# Patient Record
Sex: FEMALE | Race: WHITE | Employment: OTHER | URBAN - METROPOLITAN AREA
[De-identification: names, ages, dates, MRNs, and addresses within clinical notes are randomized per-mention and may not be internally consistent; named-entity substitution may affect disease eponyms.]

---

## 2018-05-13 ENCOUNTER — APPOINTMENT (OUTPATIENT)
Dept: GENERAL RADIOLOGY | Age: 83
End: 2018-05-13
Attending: EMERGENCY MEDICINE
Payer: MEDICARE

## 2018-05-13 ENCOUNTER — HOSPITAL ENCOUNTER (EMERGENCY)
Age: 83
Discharge: HOME | End: 2018-05-13
Attending: EMERGENCY MEDICINE
Payer: MEDICARE

## 2018-05-13 ENCOUNTER — APPOINTMENT (OUTPATIENT)
Dept: CT IMAGING | Age: 83
End: 2018-05-13
Attending: EMERGENCY MEDICINE
Payer: MEDICARE

## 2018-05-13 VITALS
HEART RATE: 73 BPM | RESPIRATION RATE: 19 BRPM | SYSTOLIC BLOOD PRESSURE: 188 MMHG | WEIGHT: 125 LBS | TEMPERATURE: 98 F | BODY MASS INDEX: 25.2 KG/M2 | HEIGHT: 59 IN | DIASTOLIC BLOOD PRESSURE: 78 MMHG | OXYGEN SATURATION: 95 %

## 2018-05-13 DIAGNOSIS — N39.0 URINARY TRACT INFECTION WITHOUT HEMATURIA, SITE UNSPECIFIED: ICD-10-CM

## 2018-05-13 LAB
APPEARANCE UR: ABNORMAL
BACTERIA URNS QL MICRO: ABNORMAL /HPF
BILIRUB UR QL: NEGATIVE
COLOR UR: ABNORMAL
EPITH CASTS URNS QL MICRO: ABNORMAL /LPF
GLUCOSE UR STRIP.AUTO-MCNC: NEGATIVE MG/DL
HGB UR QL STRIP: ABNORMAL
HYALINE CASTS URNS QL MICRO: ABNORMAL /LPF (ref 0–5)
KETONES UR QL STRIP.AUTO: NEGATIVE MG/DL
LEUKOCYTE ESTERASE UR QL STRIP.AUTO: ABNORMAL
NITRITE UR QL STRIP.AUTO: POSITIVE
PH UR STRIP: 7.5 [PH] (ref 5–8)
PROT UR STRIP-MCNC: 30 MG/DL
RBC #/AREA URNS HPF: ABNORMAL /HPF (ref 0–5)
SP GR UR REFRACTOMETRY: 1.01 (ref 1–1.03)
UA: UC IF INDICATED,UAUC: ABNORMAL
UROBILINOGEN UR QL STRIP.AUTO: 0.2 EU/DL (ref 0.2–1)
WBC URNS QL MICRO: >100 /HPF (ref 0–4)

## 2018-05-13 PROCEDURE — 99285 EMERGENCY DEPT VISIT HI MDM: CPT

## 2018-05-13 PROCEDURE — 74011250637 HC RX REV CODE- 250/637: Performed by: EMERGENCY MEDICINE

## 2018-05-13 PROCEDURE — 87086 URINE CULTURE/COLONY COUNT: CPT | Performed by: EMERGENCY MEDICINE

## 2018-05-13 PROCEDURE — 72100 X-RAY EXAM L-S SPINE 2/3 VWS: CPT

## 2018-05-13 PROCEDURE — 81001 URINALYSIS AUTO W/SCOPE: CPT | Performed by: EMERGENCY MEDICINE

## 2018-05-13 PROCEDURE — 87077 CULTURE AEROBIC IDENTIFY: CPT | Performed by: EMERGENCY MEDICINE

## 2018-05-13 PROCEDURE — 93005 ELECTROCARDIOGRAM TRACING: CPT

## 2018-05-13 PROCEDURE — 70450 CT HEAD/BRAIN W/O DYE: CPT

## 2018-05-13 PROCEDURE — 87186 SC STD MICRODIL/AGAR DIL: CPT | Performed by: EMERGENCY MEDICINE

## 2018-05-13 RX ORDER — TRAMADOL HYDROCHLORIDE 50 MG/1
50 TABLET ORAL
Qty: 10 TAB | Refills: 0 | Status: ON HOLD | OUTPATIENT
Start: 2018-05-13 | End: 2018-10-30

## 2018-05-13 RX ORDER — ACETAMINOPHEN 325 MG/1
650 TABLET ORAL
Status: COMPLETED | OUTPATIENT
Start: 2018-05-13 | End: 2018-05-13

## 2018-05-13 RX ORDER — CEPHALEXIN 500 MG/1
500 CAPSULE ORAL 3 TIMES DAILY
Qty: 21 CAP | Refills: 0 | Status: SHIPPED | OUTPATIENT
Start: 2018-05-13 | End: 2018-05-20

## 2018-05-13 RX ORDER — LIDOCAINE 4 G/100G
PATCH TOPICAL
Qty: 1 PACKAGE | Refills: 0 | Status: ON HOLD | OUTPATIENT
Start: 2018-05-13 | End: 2018-10-30

## 2018-05-13 RX ADMIN — ACETAMINOPHEN 650 MG: 325 TABLET ORAL at 14:04

## 2018-05-13 NOTE — ED TRIAGE NOTES
Pt comes via EMS for GLF. Pt states she \"slipped and fell in the Kitchen, hitting her head. C/o lower back pain 5/10. Pty ambulatory with 1 assist and with walker. \" denies loss in consciousness or syncope. Denies chest pain or shortness of breath.

## 2018-05-13 NOTE — DISCHARGE INSTRUCTIONS
Compression Fracture of the Spine: Care Instructions  Your Care Instructions    A compression fracture happens when the front part of a spinal bone breaks and collapses. A fall or other accident can cause it. A minor injury or moving the wrong way can cause a break if you have thin or brittle bones (osteoporosis). These types of breaks will heal in 8 to 10 weeks. You will need rest and pain medicines. Your doctor may recommend physical therapy. Some doctors recommend that certain people with compression fractures wear braces. Your doctor also may treat thin or brittle bones. You may need surgery if you have lasting pain or if the bone presses on the spinal cord or nerves. You heal best when you take good care of yourself. Eat a variety of healthy foods, and don't smoke. Follow-up care is a key part of your treatment and safety. Be sure to make and go to all appointments, and call your doctor if you are having problems. It's also a good idea to know your test results and keep a list of the medicines you take. How can you care for yourself at home? · Be safe with medicines. Read and follow all instructions on the label. ¨ If the doctor gave you a prescription medicine for pain, take it as prescribed. ¨ If you are not taking a prescription pain medicine, ask your doctor if you can take an over-the-counter medicine. · Talk to your doctor about how to make your bones stronger. You may need medicines or a change in what you eat. · Be active only as directed by your doctor. When should you call for help? Call 911 anytime you think you may need emergency care. For example, call if:  ? · You are unable to move an arm or a leg at all. ?Call your doctor now or seek immediate medical care if:  ? · You have new or worse symptoms in your arms, legs, belly, or buttocks. Symptoms may include:  ¨ Numbness or tingling. ¨ Weakness. ¨ Pain. ? · You lose bladder or bowel control.    ? · You have belly pain, bloating, vomiting, or nausea. ? Watch closely for changes in your health, and be sure to contact your doctor if:  ? · You do not get better as expected. Where can you learn more? Go to http://lisbet-jose ramon.info/. Enter P445 in the search box to learn more about \"Compression Fracture of the Spine: Care Instructions. \"  Current as of: March 21, 2017  Content Version: 11.4  © 2207-3364 [employer]. Care instructions adapted under license by [employer] (which disclaims liability or warranty for this information). If you have questions about a medical condition or this instruction, always ask your healthcare professional. [place] [address] any warranty or liability for your use of this information. Urinary Tract Infection in Women: Care Instructions  Your Care Instructions    A urinary tract infection, or UTI, is a general term for an infection anywhere between the kidneys and the urethra (where urine comes out). Most UTIs are bladder infections. They often cause pain or burning when you urinate. UTIs are caused by bacteria and can be cured with antibiotics. Be sure to complete your treatment so that the infection goes away. Follow-up care is a key part of your treatment and safety. Be sure to make and go to all appointments, and call your doctor if you are having problems. It's also a good idea to know your test results and keep a list of the medicines you take. How can you care for yourself at home? · Take your antibiotics as directed. Do not stop taking them just because you feel better. You need to take the full course of antibiotics. · Drink extra water and other fluids for the next day or two. This may help wash out the bacteria that are causing the infection.  (If you have kidney, heart, or liver disease and have to limit fluids, talk with your doctor before you increase your fluid intake.)  · Avoid drinks that are carbonated or have caffeine. They can irritate the bladder. · Urinate often. Try to empty your bladder each time. · To relieve pain, take a hot bath or lay a heating pad set on low over your lower belly or genital area. Never go to sleep with a heating pad in place. To prevent UTIs  · Drink plenty of water each day. This helps you urinate often, which clears bacteria from your system. (If you have kidney, heart, or liver disease and have to limit fluids, talk with your doctor before you increase your fluid intake.)  · Urinate when you need to. · Urinate right after you have sex. · Change sanitary pads often. · Avoid douches, bubble baths, feminine hygiene sprays, and other feminine hygiene products that have deodorants. · After going to the bathroom, wipe from front to back. When should you call for help? Call your doctor now or seek immediate medical care if:  ? · Symptoms such as fever, chills, nausea, or vomiting get worse or appear for the first time. ? · You have new pain in your back just below your rib cage. This is called flank pain. ? · There is new blood or pus in your urine. ? · You have any problems with your antibiotic medicine. ? Watch closely for changes in your health, and be sure to contact your doctor if:  ? · You are not getting better after taking an antibiotic for 2 days. ? · Your symptoms go away but then come back. Where can you learn more? Go to http://lisbet-jose ramon.info/. Enter Q038 in the search box to learn more about \"Urinary Tract Infection in Women: Care Instructions. \"  Current as of: May 12, 2017  Content Version: 11.4  © 7459-7957 [employer]. Care instructions adapted under license by [employer] (which disclaims liability or warranty for this information).  If you have questions about a medical condition or this instruction, always ask your healthcare professional. [place] [address] any warranty or liability for your use of this information.

## 2018-05-13 NOTE — ED PROVIDER NOTES
EMERGENCY DEPARTMENT HISTORY AND PHYSICAL EXAM      Date: 5/13/2018  Patient Name: Susu Ann    History of Presenting Illness     Chief Complaint   Patient presents with    Fall     Pt arrived via EMS for c/o of GLF with lower back discomfor and pain to back of head  5/10. Pt state she hit her head after sliping on the kitchen floor, but denies loss in conciousness.  Back Pain       History Provided By: Patient    HPI: Foreign Martinez, 80 y.o. female with PMHx significant for HTN, angina, MI, CAD, cancer, arrhythmia, presents via EMS to the ED for evaluation s/p ground level fall x today. She currently c/o severe, intermittent lower back pain that is exacerbated by movement x couple of weeks. Pt describes walking through the kitchen when all of a sudden she slipped and fell to the ground hitting her head. She denies any LOC, and was not able to get up s/p the fall. Of note, pt wears an NTG patch due to her Hx of CAD, and has been taking over the counter Tylenol recently for her back pain. She denies any abdominal pain. There are no other denying factors at this time. There are no other complaints, changes, or physical findings at this time. PCP: Renetta Marcelino MD    Current Outpatient Prescriptions   Medication Sig Dispense Refill    cephALEXin (KEFLEX) 500 mg capsule Take 1 Cap by mouth three (3) times daily for 7 days. 21 Cap 0    lidocaine (SALONPAS/ASPERCREME) 4 % patch Take as directed 1 Package 0    traMADol (ULTRAM) 50 mg tablet Take 1 Tab by mouth every six (6) hours as needed for Pain. Max Daily Amount: 200 mg. Indications: Pain 10 Tab 0    furosemide (LASIX) 40 mg tablet Just two more days 2 Tab 0    simvastatin (ZOCOR) 10 mg tablet Take 10 mg by mouth daily.  cloNIDine HCl (CATAPRES) 0.1 mg tablet Take 0.1 mg by mouth nightly.  diazepam (VALIUM) 2 mg tablet Take 2 mg by mouth every twelve (12) hours.       nitroglycerin (NITRODUR) 0.2 mg/hr 1 Patch by TransDERmal route daily.  trazodone (DESYREL) 50 mg tablet Take 50 mg by mouth nightly.  aspirin 81 mg tablet Take 81 mg by mouth daily.  metoprolol (LOPRESSOR) 50 mg tablet Take 25 mg by mouth every morning.  metoprolol (LOPRESSOR) 50 mg tablet Take 50 mg by mouth every evening.  Fish Oil-Omega-3 Fatty Acids (FISH OIL) 360-1,200 mg Cap Take 1 Cap by mouth two (2) times a day.  nitroglycerin (NITROSTAT) 0.4 mg SL tablet 0.4 mg by SubLINGual route every five (5) minutes as needed. Past History     Past Medical History:  Past Medical History:   Diagnosis Date    Angina     has had mi    Arrhythmia     Cancer ([facility] [address]     basal cell removed form back    Chronic pain     fibromyalgia    Hypertension     Insomnia     Thyroid disease        Past Surgical History:  Past Surgical History:   Procedure Laterality Date    HX CHOLECYSTECTOMY      HX CORONARY STENT PLACEMENT      HX OTHER SURGICAL      basal cell removed from back       Family History:  History reviewed. No pertinent family history. Social History:  Social History   Substance Use Topics    Smoking status: Never Smoker    Smokeless tobacco: Never Used    Alcohol use No       Allergies: Allergies   Allergen Reactions    Celebrex [Celecoxib] Rash    Ciprofloxacin Rash    Ivp [Fd And C Blue No.1] Rash and Swelling    Macrobid [Nitrofurantoin Monohyd/M-Cryst] Swelling    Niaspan [Niacin] Palpitations    Pcn [Penicillins] Rash and Swelling    Seafood [Shellfish Containing Products] Rash and Swelling         Review of Systems   Review of Systems   Constitutional: Negative for fatigue and fever. HENT: Negative. Eyes: Negative. Respiratory: Negative for shortness of breath and wheezing. Cardiovascular: Negative for chest pain and leg swelling. Gastrointestinal: Negative for abdominal pain, blood in stool, constipation, diarrhea, nausea and vomiting. Endocrine: Negative.     Genitourinary: Negative for difficulty urinating and dysuria. Musculoskeletal: Positive for back pain. Skin: Negative for rash. Allergic/Immunologic: Negative. Neurological: Negative for weakness and numbness. Hematological: Negative. Psychiatric/Behavioral: Negative. Physical Exam   Physical Exam   Constitutional: She is oriented to person, place, and time. She appears well-developed and well-nourished. HENT:   Head: Normocephalic and atraumatic. Mouth/Throat: Mucous membranes are normal.   Eyes: EOM are normal. Pupils are equal, round, and reactive to light. Neck: Normal range of motion. No JVD present. No tracheal deviation present. Cardiovascular: Normal rate, regular rhythm, normal heart sounds and intact distal pulses. Exam reveals no gallop and no friction rub. No murmur heard. Pulmonary/Chest: Effort normal and breath sounds normal. No stridor. She has no wheezes. She has no rales. She exhibits no tenderness. Abdominal: Soft. Bowel sounds are normal. She exhibits no distension and no mass. There is no tenderness. There is no guarding. Musculoskeletal: Normal range of motion. She exhibits no edema or tenderness. No T spine tenderness or stepoffs. Tenderness to her L3-L5 region as well as some para spinal lumbar tenderness. Full ROM of extremities. Stable pelvis. No swelling or deformities. No midline C spine tenderness or stepoffs. Nml ROM of neck. Neurological: She is alert and oriented to person, place, and time. Skin: Skin is warm and dry. No rash noted. Hematoma on her posterior occiput. Psychiatric: She has a normal mood and affect.  Her behavior is normal. Judgment and thought content normal.         Diagnostic Study Results     Labs -     Recent Results (from the past 12 hour(s))   EKG, 12 LEAD, INITIAL    Collection Time: 05/13/18 11:44 AM   Result Value Ref Range    Ventricular Rate 74 BPM    Atrial Rate 74 BPM    P-R Interval 210 ms    QRS Duration 98 ms    Q-T Interval 416 ms QTC Calculation (Bezet) 461 ms    Calculated P Axis 57 degrees    Calculated R Axis -31 degrees    Calculated T Axis 28 degrees    Diagnosis       Sinus rhythm with 1st degree AV block  Left axis deviation  Abnormal ECG  When compared with ECG of 04-FEB-2016 20:20,  No significant change was found     URINALYSIS W/ REFLEX CULTURE    Collection Time: 05/13/18 12:46 PM   Result Value Ref Range    Color YELLOW/STRAW      Appearance CLOUDY (A) CLEAR      Specific gravity 1.008 1.003 - 1.030      pH (UA) 7.5 5.0 - 8.0      Protein 30 (A) NEG mg/dL    Glucose NEGATIVE  NEG mg/dL    Ketone NEGATIVE  NEG mg/dL    Bilirubin NEGATIVE  NEG      Blood SMALL (A) NEG      Urobilinogen 0.2 0.2 - 1.0 EU/dL    Nitrites POSITIVE (A) NEG      Leukocyte Esterase LARGE (A) NEG      WBC >100 (H) 0 - 4 /hpf    RBC 0-5 0 - 5 /hpf    Epithelial cells FEW FEW /lpf    Bacteria 4+ (A) NEG /hpf    UA:UC IF INDICATED URINE CULTURE ORDERED (A) CNI      Hyaline cast 0-2 0 - 5 /lpf       Radiologic Studies -   XR SPINE LUMB 2 OR 3 V   Final Result   EXAM:  XR SPINE LUMB 2 OR 3 V     INDICATION:   Back Pain     COMPARISON: None.     FINDINGS: AP, lateral and spot lateral views of the lumbar spine demonstrate  moderate diffuse osteopenia. There are are moderate T12, mild to moderate L3,  mild L4 and minimal-mild L5 vertebral compression fractures. There is anatomic  alignment. Degenerative findings are shown in the thoracic region and throughout  the lumbar spine with mild disc space narrowing through L3-4. There is mild  bilateral SI osteoarthrosis. Partial visualization of internal fixation of the  right proximal femur shown.      IMPRESSION  IMPRESSION:  Osteopenia, vertebral compression fractures and degenerative  changes.        CT Results  (Last 48 hours)               05/13/18 1217  CT HEAD WO CONT Final result    Impression:  IMPRESSION:       No acute traumatic injury.            Narrative:  INDICATION:   Head trauma, closed, mild, GCS >= 13, no risk factors, neuro exam   normal        EXAMINATION:  CT HEAD WO CONTRAST       COMPARISON:  February 4, 2016       TECHNIQUE:  Routine noncontrast axial head CT was performed. Sagittal and   coronal reconstructions were generated. CT dose reduction was achieved through   use of a standardized protocol tailored for this examination and automatic   exposure control for dose modulation. Courtney Koehler FINDINGS:       Ventricles:  Midline, no hydrocephalus. Intracranial Hemorrhage:  None. Brain Parenchyma/Brainstem:  Patchy white matter hypodensities throughout the   supratentorial brain are chronic and similar to the prior examination. Basal Cisterns:  Normal.    Paranasal Sinuses:  Visualized sinuses are clear. Soft Tissues:  No significant soft tissue swelling. Osseous Structures:  No acute fracture. Additional Comments:  N/A. Medical Decision Making   I am the first provider for this patient. I reviewed the vital signs, available nursing notes, past medical history, past surgical history, family history and social history. Vital Signs-Reviewed the patient's vital signs. Patient Vitals for the past 12 hrs:   Temp Pulse Resp BP SpO2   05/13/18 1146 98 °F (36.7 °C) 73 19 188/78 95 %       EKG interpretation: (Preliminary) 1144  Rhythm: sinus rhythm with 1st degree AV block; and regular . Rate (approx.): 74 bpm; Axis: left axis deviation; IA interval: prolonged; QRS interval: normal ; ST/T wave: no changes; Other findings: abnormal ekg. Records Reviewed: Old Medical Records    Provider Notes (Medical Decision Making):     DDx: ICH, fracture, contusion, sprain, strain, UTI. Pt status post mechanical fall. No LOC. Is not on anticoagulants. Will get xray of lumbar spine and head ct to r/o trauma. Per son, pt tends to get UTI. Will check UA. ED Course:   Initial assessment performed.  The patients presenting problems have been discussed, and they are in agreement with the care plan formulated and outlined with them. I have encouraged them to ask questions as they arise throughout their visit. 1:53 PM  Updated the pt on lab/imaging results. Will prepare for discharge  Written by Lashaun Carr ED scribe, as dictated by Yared Parikh DO    Disposition:  DISCHARGE NOTE  2:44 PM  The patient has been re-evaluated and is ready for discharge. Reviewed available results with patient. Counseled pt on diagnosis and care plan. Pt has expressed understanding, and all questions have been answered. Pt agrees with plan and agrees to F/U as recommended, or return to the ED if their sxs worsen. Discharge instructions have been provided and explained to the pt, along with reasons to return to the ED. PLAN:  1. Discharge Medication List as of 5/13/2018  2:14 PM      START taking these medications    Details   cephALEXin (KEFLEX) 500 mg capsule Take 1 Cap by mouth three (3) times daily for 7 days. , Normal, Disp-21 Cap, R-0      lidocaine (SALONPAS/ASPERCREME) 4 % patch Take as directed, Normal, Disp-1 Package, R-0      traMADol (ULTRAM) 50 mg tablet Take 1 Tab by mouth every six (6) hours as needed for Pain. Max Daily Amount: 200 mg. Indications: Pain, Print, Disp-10 Tab, R-0         CONTINUE these medications which have NOT CHANGED    Details   furosemide (LASIX) 40 mg tablet Just two more days, Print, Disp-2 Tab, R-0      simvastatin (ZOCOR) 10 mg tablet Take 10 mg by mouth daily. , Historical Med      cloNIDine HCl (CATAPRES) 0.1 mg tablet Take 0.1 mg by mouth nightly., Historical Med      diazepam (VALIUM) 2 mg tablet Take 2 mg by mouth every twelve (12) hours. , Historical Med      nitroglycerin (NITRODUR) 0.2 mg/hr 1 Patch, TransDERmal, DAILY, Until Discontinued, Historical Med      trazodone (DESYREL) 50 mg tablet 50 mg, Oral, EVERY BEDTIME, Until Discontinued, Historical Med      aspirin 81 mg tablet 81 mg, Oral, DAILY Starting 12/2/2010, Until Discontinued, Historical Med      !! metoprolol (LOPRESSOR) 50 mg tablet 25 mg, Oral, EVERY MORNING Starting 12/2/2010, Until Discontinued, Historical Med      !! metoprolol (LOPRESSOR) 50 mg tablet 50 mg, Oral, EVERY EVENING Starting 12/2/2010, Until Discontinued, Historical Med      Fish Oil-Omega-3 Fatty Acids (FISH OIL) 360-1,200 mg Cap 1 Cap, Oral, 2 TIMES DAILY Starting 12/2/2010, Until Discontinued, Historical Med      nitroglycerin (NITROSTAT) 0.4 mg SL tablet 0.4 mg, SubLINGual, EVERY 5 MIN AS NEEDED, Until Discontinued, Historical Med       !! - Potential duplicate medications found. Please discuss with provider. 2.   Follow-up Information     Follow up With Details Comments [id number] Desai MD Schedule an appointment as soon as possible for a visit      [facility]  As needed, If symptoms worsen [address] [place] [address]  [phone]        Return to ED if worse     Diagnosis     Clinical Impression:   1. Compression fracture of vertebra, initial encounter ([facility] [address]    2. Urinary tract infection without hematuria, site unspecified        Attestations: This note is prepared by Norm Duenas, acting as Scribe for Pelon Arellano DO. The scribe's documentation has been prepared under my direction and personally reviewed by me in its entirety. I confirm that the note above accurately reflects all work, treatment, procedures, and medical decision making performed by me.   Pelon Arellano DO

## 2018-05-13 NOTE — ED NOTES
Tonia Huffman DO reviewed discharge instructions with the patient. The patient verbalized understanding. wheelchair on discharge.

## 2018-05-14 LAB
ATRIAL RATE: 74 BPM
CALCULATED P AXIS, ECG09: 57 DEGREES
CALCULATED R AXIS, ECG10: -31 DEGREES
CALCULATED T AXIS, ECG11: 28 DEGREES
DIAGNOSIS, 93000: NORMAL
P-R INTERVAL, ECG05: 210 MS
Q-T INTERVAL, ECG07: 416 MS
QRS DURATION, ECG06: 98 MS
QTC CALCULATION (BEZET), ECG08: 461 MS
VENTRICULAR RATE, ECG03: 74 BPM

## 2018-05-15 LAB
BACTERIA SPEC CULT: ABNORMAL
CC UR VC: ABNORMAL
SERVICE CMNT-IMP: ABNORMAL

## 2018-10-29 ENCOUNTER — APPOINTMENT (OUTPATIENT)
Dept: CT IMAGING | Age: 83
DRG: 478 | End: 2018-10-29
Attending: EMERGENCY MEDICINE
Payer: MEDICARE

## 2018-10-29 ENCOUNTER — APPOINTMENT (OUTPATIENT)
Dept: GENERAL RADIOLOGY | Age: 83
DRG: 478 | End: 2018-10-29
Attending: EMERGENCY MEDICINE
Payer: MEDICARE

## 2018-10-29 ENCOUNTER — HOSPITAL ENCOUNTER (INPATIENT)
Age: 83
LOS: 4 days | Discharge: NURSING, REHAB OR LONG-TERM CARE | DRG: 478 | End: 2018-11-02
Attending: EMERGENCY MEDICINE | Admitting: HOSPITALIST
Payer: MEDICARE

## 2018-10-29 DIAGNOSIS — N30.00 ACUTE CYSTITIS WITHOUT HEMATURIA: Primary | ICD-10-CM

## 2018-10-29 DIAGNOSIS — S32.040A CLOSED COMPRESSION FRACTURE OF FOURTH LUMBAR VERTEBRA, INITIAL ENCOUNTER: ICD-10-CM

## 2018-10-29 LAB
ALBUMIN SERPL-MCNC: 3.7 G/DL (ref 3.5–5)
ALBUMIN/GLOB SERPL: 0.9 {RATIO} (ref 1.1–2.2)
ALP SERPL-CCNC: 97 U/L (ref 45–117)
ALT SERPL-CCNC: 23 U/L (ref 12–78)
ANION GAP SERPL CALC-SCNC: 9 MMOL/L (ref 5–15)
APPEARANCE UR: ABNORMAL
AST SERPL-CCNC: 15 U/L (ref 15–37)
BACTERIA URNS QL MICRO: ABNORMAL /HPF
BASOPHILS # BLD: 0 K/UL (ref 0–0.1)
BASOPHILS NFR BLD: 0 % (ref 0–1)
BILIRUB SERPL-MCNC: 0.6 MG/DL (ref 0.2–1)
BILIRUB UR QL: NEGATIVE
BUN SERPL-MCNC: 10 MG/DL (ref 6–20)
BUN/CREAT SERPL: 21 (ref 12–20)
CALCIUM SERPL-MCNC: 8.7 MG/DL (ref 8.5–10.1)
CHLORIDE SERPL-SCNC: 100 MMOL/L (ref 97–108)
CO2 SERPL-SCNC: 22 MMOL/L (ref 21–32)
COLOR UR: ABNORMAL
CREAT SERPL-MCNC: 0.48 MG/DL (ref 0.55–1.02)
DIFFERENTIAL METHOD BLD: ABNORMAL
EOSINOPHIL # BLD: 0.1 K/UL (ref 0–0.4)
EOSINOPHIL NFR BLD: 1 % (ref 0–7)
EPITH CASTS URNS QL MICRO: ABNORMAL /LPF
ERYTHROCYTE [DISTWIDTH] IN BLOOD BY AUTOMATED COUNT: 15.1 % (ref 11.5–14.5)
GLOBULIN SER CALC-MCNC: 4.2 G/DL (ref 2–4)
GLUCOSE SERPL-MCNC: 113 MG/DL (ref 65–100)
GLUCOSE UR STRIP.AUTO-MCNC: NEGATIVE MG/DL
HCT VFR BLD AUTO: 44.9 % (ref 35–47)
HGB BLD-MCNC: 15.5 G/DL (ref 11.5–16)
HGB UR QL STRIP: NEGATIVE
HYALINE CASTS URNS QL MICRO: ABNORMAL /LPF (ref 0–5)
IMM GRANULOCYTES # BLD: 0.1 K/UL (ref 0–0.04)
IMM GRANULOCYTES NFR BLD AUTO: 1 % (ref 0–0.5)
KETONES UR QL STRIP.AUTO: ABNORMAL MG/DL
LACTATE SERPL-SCNC: 1.1 MMOL/L (ref 0.4–2)
LEUKOCYTE ESTERASE UR QL STRIP.AUTO: ABNORMAL
LYMPHOCYTES # BLD: 0.7 K/UL (ref 0.8–3.5)
LYMPHOCYTES NFR BLD: 7 % (ref 12–49)
MCH RBC QN AUTO: 32.2 PG (ref 26–34)
MCHC RBC AUTO-ENTMCNC: 34.5 G/DL (ref 30–36.5)
MCV RBC AUTO: 93.2 FL (ref 80–99)
MONOCYTES # BLD: 0.6 K/UL (ref 0–1)
MONOCYTES NFR BLD: 6 % (ref 5–13)
NEUTS SEG # BLD: 9 K/UL (ref 1.8–8)
NEUTS SEG NFR BLD: 85 % (ref 32–75)
NITRITE UR QL STRIP.AUTO: POSITIVE
NRBC # BLD: 0 K/UL (ref 0–0.01)
NRBC BLD-RTO: 0 PER 100 WBC
PH UR STRIP: 7.5 [PH] (ref 5–8)
PLATELET # BLD AUTO: 255 K/UL (ref 150–400)
PMV BLD AUTO: 10.3 FL (ref 8.9–12.9)
POTASSIUM SERPL-SCNC: 3.5 MMOL/L (ref 3.5–5.1)
PROT SERPL-MCNC: 7.9 G/DL (ref 6.4–8.2)
PROT UR STRIP-MCNC: ABNORMAL MG/DL
RBC # BLD AUTO: 4.82 M/UL (ref 3.8–5.2)
RBC #/AREA URNS HPF: ABNORMAL /HPF (ref 0–5)
RBC MORPH BLD: ABNORMAL
SODIUM SERPL-SCNC: 131 MMOL/L (ref 136–145)
SP GR UR REFRACTOMETRY: 1.01 (ref 1–1.03)
UA: UC IF INDICATED,UAUC: ABNORMAL
UROBILINOGEN UR QL STRIP.AUTO: 0.2 EU/DL (ref 0.2–1)
WBC # BLD AUTO: 10.5 K/UL (ref 3.6–11)
WBC URNS QL MICRO: ABNORMAL /HPF (ref 0–4)

## 2018-10-29 PROCEDURE — 96365 THER/PROPH/DIAG IV INF INIT: CPT

## 2018-10-29 PROCEDURE — 85025 COMPLETE CBC W/AUTO DIFF WBC: CPT | Performed by: EMERGENCY MEDICINE

## 2018-10-29 PROCEDURE — 74011250636 HC RX REV CODE- 250/636: Performed by: EMERGENCY MEDICINE

## 2018-10-29 PROCEDURE — 87040 BLOOD CULTURE FOR BACTERIA: CPT | Performed by: EMERGENCY MEDICINE

## 2018-10-29 PROCEDURE — 81001 URINALYSIS AUTO W/SCOPE: CPT | Performed by: EMERGENCY MEDICINE

## 2018-10-29 PROCEDURE — 36415 COLL VENOUS BLD VENIPUNCTURE: CPT | Performed by: EMERGENCY MEDICINE

## 2018-10-29 PROCEDURE — 74011000258 HC RX REV CODE- 258: Performed by: EMERGENCY MEDICINE

## 2018-10-29 PROCEDURE — 87186 SC STD MICRODIL/AGAR DIL: CPT | Performed by: EMERGENCY MEDICINE

## 2018-10-29 PROCEDURE — 74176 CT ABD & PELVIS W/O CONTRAST: CPT

## 2018-10-29 PROCEDURE — 87077 CULTURE AEROBIC IDENTIFY: CPT | Performed by: EMERGENCY MEDICINE

## 2018-10-29 PROCEDURE — 83605 ASSAY OF LACTIC ACID: CPT | Performed by: EMERGENCY MEDICINE

## 2018-10-29 PROCEDURE — 72100 X-RAY EXAM L-S SPINE 2/3 VWS: CPT

## 2018-10-29 PROCEDURE — 74011250636 HC RX REV CODE- 250/636: Performed by: HOSPITALIST

## 2018-10-29 PROCEDURE — 74011250637 HC RX REV CODE- 250/637: Performed by: HOSPITALIST

## 2018-10-29 PROCEDURE — 65270000029 HC RM PRIVATE

## 2018-10-29 PROCEDURE — 87086 URINE CULTURE/COLONY COUNT: CPT | Performed by: EMERGENCY MEDICINE

## 2018-10-29 PROCEDURE — 80053 COMPREHEN METABOLIC PANEL: CPT | Performed by: EMERGENCY MEDICINE

## 2018-10-29 PROCEDURE — 99285 EMERGENCY DEPT VISIT HI MDM: CPT

## 2018-10-29 RX ORDER — DOCUSATE SODIUM 100 MG/1
100 CAPSULE, LIQUID FILLED ORAL 2 TIMES DAILY
Status: DISCONTINUED | OUTPATIENT
Start: 2018-10-30 | End: 2018-11-02 | Stop reason: HOSPADM

## 2018-10-29 RX ORDER — NALOXONE HYDROCHLORIDE 0.4 MG/ML
0.4 INJECTION, SOLUTION INTRAMUSCULAR; INTRAVENOUS; SUBCUTANEOUS AS NEEDED
Status: DISCONTINUED | OUTPATIENT
Start: 2018-10-29 | End: 2018-11-02 | Stop reason: HOSPADM

## 2018-10-29 RX ORDER — AMLODIPINE BESYLATE 5 MG/1
5 TABLET ORAL DAILY
COMMUNITY

## 2018-10-29 RX ORDER — SODIUM CHLORIDE 0.9 % (FLUSH) 0.9 %
5-10 SYRINGE (ML) INJECTION AS NEEDED
Status: DISCONTINUED | OUTPATIENT
Start: 2018-10-29 | End: 2018-11-02 | Stop reason: HOSPADM

## 2018-10-29 RX ORDER — AZTREONAM 1 G/1
1 INJECTION, POWDER, LYOPHILIZED, FOR SOLUTION INTRAMUSCULAR; INTRAVENOUS EVERY 8 HOURS
Status: DISCONTINUED | OUTPATIENT
Start: 2018-10-30 | End: 2018-10-29

## 2018-10-29 RX ORDER — CAPTOPRIL 100 MG/1
100 TABLET ORAL 2 TIMES DAILY
Status: ON HOLD | COMMUNITY
End: 2018-10-30

## 2018-10-29 RX ORDER — OXYCODONE HYDROCHLORIDE 5 MG/1
5 TABLET ORAL
Status: DISCONTINUED | OUTPATIENT
Start: 2018-10-29 | End: 2018-11-02 | Stop reason: HOSPADM

## 2018-10-29 RX ORDER — DIPHENHYDRAMINE HCL 25 MG
25 CAPSULE ORAL
Status: DISCONTINUED | OUTPATIENT
Start: 2018-10-29 | End: 2018-10-30

## 2018-10-29 RX ORDER — SODIUM CHLORIDE 9 MG/ML
75 INJECTION, SOLUTION INTRAVENOUS CONTINUOUS
Status: DISCONTINUED | OUTPATIENT
Start: 2018-10-29 | End: 2018-11-02 | Stop reason: HOSPADM

## 2018-10-29 RX ORDER — GUAIFENESIN 100 MG/5ML
81 LIQUID (ML) ORAL DAILY
Status: DISCONTINUED | OUTPATIENT
Start: 2018-10-30 | End: 2018-11-02 | Stop reason: HOSPADM

## 2018-10-29 RX ORDER — ACETAMINOPHEN 325 MG/1
650 TABLET ORAL EVERY 6 HOURS
Status: DISCONTINUED | OUTPATIENT
Start: 2018-10-30 | End: 2018-11-02 | Stop reason: HOSPADM

## 2018-10-29 RX ORDER — AMLODIPINE BESYLATE 5 MG/1
5 TABLET ORAL DAILY
Status: DISCONTINUED | OUTPATIENT
Start: 2018-10-30 | End: 2018-11-02 | Stop reason: HOSPADM

## 2018-10-29 RX ORDER — CAPTOPRIL 50 MG/1
50 TABLET ORAL
Status: DISCONTINUED | OUTPATIENT
Start: 2018-10-30 | End: 2018-10-30

## 2018-10-29 RX ORDER — ONDANSETRON 2 MG/ML
4 INJECTION INTRAMUSCULAR; INTRAVENOUS
Status: DISCONTINUED | OUTPATIENT
Start: 2018-10-29 | End: 2018-11-02 | Stop reason: HOSPADM

## 2018-10-29 RX ORDER — ATORVASTATIN CALCIUM 10 MG/1
10 TABLET, FILM COATED ORAL DAILY
Status: DISCONTINUED | OUTPATIENT
Start: 2018-10-30 | End: 2018-11-02 | Stop reason: HOSPADM

## 2018-10-29 RX ORDER — SODIUM CHLORIDE 0.9 % (FLUSH) 0.9 %
5-10 SYRINGE (ML) INJECTION EVERY 8 HOURS
Status: DISCONTINUED | OUTPATIENT
Start: 2018-10-29 | End: 2018-11-02 | Stop reason: HOSPADM

## 2018-10-29 RX ORDER — CAPTOPRIL 50 MG/1
100 TABLET ORAL DAILY
Status: DISCONTINUED | OUTPATIENT
Start: 2018-10-30 | End: 2018-10-30

## 2018-10-29 RX ORDER — TRAZODONE HYDROCHLORIDE 50 MG/1
50 TABLET ORAL
Status: DISCONTINUED | OUTPATIENT
Start: 2018-10-29 | End: 2018-11-02 | Stop reason: HOSPADM

## 2018-10-29 RX ORDER — DIAZEPAM 2 MG/1
2 TABLET ORAL
Status: DISCONTINUED | OUTPATIENT
Start: 2018-10-29 | End: 2018-11-02 | Stop reason: HOSPADM

## 2018-10-29 RX ADMIN — CEFTRIAXONE 1 G: 1 INJECTION, POWDER, FOR SOLUTION INTRAMUSCULAR; INTRAVENOUS at 19:18

## 2018-10-29 RX ADMIN — TRAZODONE HYDROCHLORIDE 50 MG: 50 TABLET ORAL at 23:26

## 2018-10-29 RX ADMIN — Medication 10 ML: at 23:27

## 2018-10-29 RX ADMIN — SODIUM CHLORIDE 75 ML/HR: 900 INJECTION, SOLUTION INTRAVENOUS at 23:29

## 2018-10-29 RX ADMIN — DIPHENHYDRAMINE HYDROCHLORIDE 25 MG: 25 CAPSULE ORAL at 23:26

## 2018-10-29 RX ADMIN — ACETAMINOPHEN 650 MG: 325 TABLET ORAL at 23:26

## 2018-10-29 NOTE — ED TRIAGE NOTES
Pt arrives with EMS from her doctors office for mid lower back pain. Pt provided urine sample at MD office and results were consistent with UTI. Pt had fall in July and has had pain on and off since. Pt states this pain has been on going x 1 week.

## 2018-10-29 NOTE — ED PROVIDER NOTES
EMERGENCY DEPARTMENT HISTORY AND PHYSICAL EXAM 
 
 
Date: 10/29/2018 Patient Name: Oni Ann History of Presenting Illness Chief Complaint Patient presents with  Back Pain History Provided By: Patient and EMS 
 
HPI: Michelle Larkin, 80 y.o. female with PMHx significant for HTN, osteoporosis, presents via EMS to the ED with cc of 9/10, non-radiating, mid and lower back pain x 1 week, worsening x 3 days ago. She denies any associated symptoms. She states her pain is exacerbated with movement, but is provided with relief while staying still. Pt was seen at her PCP's office today and further referred to the ER. Paperwork shows the pt had a UA consistent with a UTI and had attempted to treat her back pain with Extra Strength Tylenol without relief. She reports her PCP was concerned about her kidneys and had further referred her to the ER. Pt believes she has a kidney infection as she has a h/o this. Pt denies self treating her symptom at home, but states she was given a medication in office today. She informs she ambulates with a walker at baseline, but denies any increase in difficulty with ambulation due to the back pain. Pt denies any recent falls. She denies any h/o prior surgeries, compression fractures, DM, cardiac disease, or pulmonary disease. Pt denies any urinary pain, fever, nausea, vomiting, or numbness. Social hx: (-) tobacco, (-) alcohol, (-) illicit drugs There are no other complaints, changes, or physical findings at this time. PCP: Basia Han MD 
 
Current Facility-Administered Medications Medication Dose Route Frequency Provider Last Rate Last Dose  cefTRIAXone (ROCEPHIN) 1 g in 0.9% sodium chloride (MBP/ADV) 50 mL  1 g IntraVENous NOW Call, Jd Jeffrey MD      
 
Current Outpatient Medications Medication Sig Dispense Refill  lidocaine (SALONPAS/ASPERCREME) 4 % patch Take as directed 1 Package 0  
  traMADol (ULTRAM) 50 mg tablet Take 1 Tab by mouth every six (6) hours as needed for Pain. Max Daily Amount: 200 mg. Indications: Pain 10 Tab 0  
 furosemide (LASIX) 40 mg tablet Just two more days 2 Tab 0  
 simvastatin (ZOCOR) 10 mg tablet Take 10 mg by mouth daily.  cloNIDine HCl (CATAPRES) 0.1 mg tablet Take 0.1 mg by mouth nightly.  diazepam (VALIUM) 2 mg tablet Take 2 mg by mouth every twelve (12) hours.  nitroglycerin (NITRODUR) 0.2 mg/hr 1 Patch by TransDERmal route daily.  trazodone (DESYREL) 50 mg tablet Take 50 mg by mouth nightly.  aspirin 81 mg tablet Take 81 mg by mouth daily.  nitroglycerin (NITROSTAT) 0.4 mg SL tablet 0.4 mg by SubLINGual route every five (5) minutes as needed.  metoprolol (LOPRESSOR) 50 mg tablet Take 25 mg by mouth every morning.  metoprolol (LOPRESSOR) 50 mg tablet Take 50 mg by mouth every evening.  Fish Oil-Omega-3 Fatty Acids (FISH OIL) 360-1,200 mg Cap Take 1 Cap by mouth two (2) times a day. Past History Past Medical History: 
Past Medical History:  
Diagnosis Date  Angina   
 has had mi  Arrhythmia  Cancer ([facility] [address]   
 basal cell removed form back  Chronic pain   
 fibromyalgia  Hypertension  Insomnia  Thyroid disease Past Surgical History: 
Past Surgical History:  
Procedure Laterality Date  HX CHOLECYSTECTOMY  HX CORONARY STENT PLACEMENT    
 HX OTHER SURGICAL    
 basal cell removed from back Family History: 
History reviewed. No pertinent family history. Social History: 
Social History Tobacco Use  Smoking status: Never Smoker  Smokeless tobacco: Never Used Substance Use Topics  Alcohol use: No  
 Drug use: No  
 
 
Allergies: Allergies Allergen Reactions  Celebrex [Celecoxib] Rash  Ciprofloxacin Rash  Ivp [Fd And C Blue No.1] Rash and Swelling  Macrobid [Nitrofurantoin Monohyd/M-Cryst] Swelling  Niaspan [Niacin] Palpitations  Pcn [Penicillins] Rash and Swelling  Seafood [Shellfish Containing Products] Rash and Swelling Review of Systems Review of Systems Constitutional: Negative for chills and fever. HENT: Negative for congestion, rhinorrhea, sneezing and sore throat. Respiratory: Negative for shortness of breath. Cardiovascular: Negative for chest pain. Gastrointestinal: Negative for abdominal pain, nausea and vomiting. Genitourinary: Negative for dysuria. Musculoskeletal: Positive for back pain. Negative for myalgias and neck stiffness. Skin: Negative for rash. Neurological: Negative for dizziness, weakness, numbness and headaches. All other systems reviewed and are negative. Physical Exam  
Physical Exam  
Constitutional: She is oriented to person, place, and time. She appears well-developed and well-nourished. Appears uncomfortable HENT:  
Head: Normocephalic and atraumatic. Mouth/Throat: Oropharynx is clear and moist.  
Eyes: Conjunctivae and EOM are normal.  
Neck: Normal range of motion and full passive range of motion without pain. Neck supple. Cardiovascular: Normal rate, regular rhythm, S1 normal, S2 normal, normal heart sounds, intact distal pulses and normal pulses. No murmur heard. Pulmonary/Chest: Effort normal and breath sounds normal. No respiratory distress. She has no wheezes. Abdominal: Soft. Normal appearance and bowel sounds are normal. She exhibits no distension. There is no tenderness. There is no rebound and no CVA tenderness. Musculoskeletal: Normal range of motion. No midline spinal tenderness Neurological: She is alert and oriented to person, place, and time. She has normal strength. Skin: Skin is warm, dry and intact. No rash noted. Psychiatric: She has a normal mood and affect. Her speech is normal and behavior is normal. Judgment and thought content normal.  
Nursing note and vitals reviewed. Diagnostic Study Results Labs -  
 Recent Results (from the past 12 hour(s)) URINALYSIS W/ REFLEX CULTURE Collection Time: 10/29/18  3:50 PM  
Result Value Ref Range Color YELLOW/STRAW Appearance CLOUDY (A) CLEAR Specific gravity 1.008 1.003 - 1.030    
 pH (UA) 7.5 5.0 - 8.0 Protein TRACE (A) NEG mg/dL Glucose NEGATIVE  NEG mg/dL Ketone TRACE (A) NEG mg/dL Bilirubin NEGATIVE  NEG Blood NEGATIVE  NEG Urobilinogen 0.2 0.2 - 1.0 EU/dL Nitrites POSITIVE (A) NEG Leukocyte Esterase LARGE (A) NEG    
 WBC  0 - 4 /hpf  
 RBC 0-5 0 - 5 /hpf Epithelial cells FEW FEW /lpf Bacteria 4+ (A) NEG /hpf  
 UA:UC IF INDICATED URINE CULTURE ORDERED (A) CNI Hyaline cast 0-2 0 - 5 /lpf  
CBC WITH AUTOMATED DIFF Collection Time: 10/29/18  4:00 PM  
Result Value Ref Range WBC 10.5 3.6 - 11.0 K/uL  
 RBC 4.82 3.80 - 5.20 M/uL  
 HGB 15.5 11.5 - 16.0 g/dL HCT 44.9 35.0 - 47.0 % MCV 93.2 80.0 - 99.0 FL  
 MCH 32.2 26.0 - 34.0 PG  
 MCHC 34.5 30.0 - 36.5 g/dL  
 RDW 15.1 (H) 11.5 - 14.5 % PLATELET 199 888 - 600 K/uL MPV 10.3 8.9 - 12.9 FL  
 NRBC 0.0 0  WBC ABSOLUTE NRBC 0.00 0.00 - 0.01 K/uL NEUTROPHILS 85 (H) 32 - 75 % LYMPHOCYTES 7 (L) 12 - 49 % MONOCYTES 6 5 - 13 % EOSINOPHILS 1 0 - 7 % BASOPHILS 0 0 - 1 % IMMATURE GRANULOCYTES 1 (H) 0.0 - 0.5 % ABS. NEUTROPHILS 9.0 (H) 1.8 - 8.0 K/UL  
 ABS. LYMPHOCYTES 0.7 (L) 0.8 - 3.5 K/UL  
 ABS. MONOCYTES 0.6 0.0 - 1.0 K/UL  
 ABS. EOSINOPHILS 0.1 0.0 - 0.4 K/UL  
 ABS. BASOPHILS 0.0 0.0 - 0.1 K/UL  
 ABS. IMM. GRANS. 0.1 (H) 0.00 - 0.04 K/UL  
 DF AUTOMATED    
 RBC COMMENTS NORMOCYTIC, NORMOCHROMIC METABOLIC PANEL, COMPREHENSIVE Collection Time: 10/29/18  4:00 PM  
Result Value Ref Range Sodium 131 (L) 136 - 145 mmol/L Potassium 3.5 3.5 - 5.1 mmol/L Chloride 100 97 - 108 mmol/L  
 CO2 22 21 - 32 mmol/L Anion gap 9 5 - 15 mmol/L Glucose 113 (H) 65 - 100 mg/dL  BUN 10 6 - 20 MG/DL  
 Creatinine 0.48 (L) 0.55 - 1.02 MG/DL  
 BUN/Creatinine ratio 21 (H) 12 - 20 GFR est AA >60 >60 ml/min/1.73m2 GFR est non-AA >60 >60 ml/min/1.73m2 Calcium 8.7 8.5 - 10.1 MG/DL Bilirubin, total 0.6 0.2 - 1.0 MG/DL  
 ALT (SGPT) 23 12 - 78 U/L  
 AST (SGOT) 15 15 - 37 U/L Alk. phosphatase 97 45 - 117 U/L Protein, total 7.9 6.4 - 8.2 g/dL Albumin 3.7 3.5 - 5.0 g/dL Globulin 4.2 (H) 2.0 - 4.0 g/dL A-G Ratio 0.9 (L) 1.1 - 2.2 Radiologic Studies -  
XR SPINE LUMB 2 OR 3 V Final Result Initial Result:  
Impression:  
 IMPRESSION: 
1. Multilevel fractures with progressive collapse of L4 
2. Osteopenia and a very Narrative:  
 INDICATION:  Back Pain EXAM: 3 views lumbar spine. Comparison May 13, 2018 FINDINGS: There are chronic fractures of T12 and superior endplate of L3. There 
is progressive collapse of L4 with approximately 50% vertebral body height loss. Collapse of L5 is unchanged. Bones are osteopenic with mild multilevel 
degenerative change. There are vascular calcifications CT Results  (Last 48 hours) None Medical Decision Making I am the first provider for this patient. I reviewed the vital signs, available nursing notes, past medical history, past surgical history, family history and social history. Vital Signs-Reviewed the patient's vital signs. Patient Vitals for the past 12 hrs: 
 Temp Pulse Resp BP SpO2  
10/29/18 1601    180/80 95 % 10/29/18 1532 98.5 °F (36.9 °C) 79 16 193/74 95 % Pulse Oximetry Analysis - 95% on RA Cardiac Monitor:  
Rate: 84 bpm 
Rhythm: Normal Sinus Rhythm Records Reviewed: Nursing Notes, Old Medical Records, Ambulance Run Sheet, Previous Radiology Studies and Previous Laboratory Studies Provider Notes (Medical Decision Making): DDx: UTI, pyelo, compression fx, epidural abscess/hematoma ED Course: Initial assessment performed. The patients presenting problems have been discussed, and they are in agreement with the care plan formulated and outlined with them. I have encouraged them to ask questions as they arise throughout their visit. PROGRESS NOTE: 
3:58 PM 
Pt has been re-evaluated. She states her pain is now a 1/10 and declines any pain medication at this time. CONSULT NOTE:  
6:11 PM 
Mino Wallace. MD [facility] spoke with Dr. Beatrice Mccord, Specialty: Hospitalist 
Discussed pt's hx, disposition, and available diagnostic and imaging results. Reviewed care plans. Consultant will evaluate pt for admission. Written by Tanya Wiseman, ED Scribe, as dictated by Mino Wallace. MD [facility]. Critical Care Time:  
0 Disposition: PLAN: 
1. Admit ADMIT NOTE: 
6:11 PM 
Patient is being admitted to the hospital by Dr. Beatrice Mccord. The results of their tests and reasons for their admission have been discussed with them and/or available family. They convey agreement and understanding for the need to be admitted and for their admission diagnosis. Consultation has been made with the inpatient physician specialist for hospitalization. 6:51 PM 
Spoke with pt's son, J Carlos Leung via telephone, notified of his mother's status and pending admission. Diagnosis Clinical Impression: 1. Acute cystitis without hematuria 2. Closed compression fracture of fourth lumbar vertebra, initial encounter ([facility] [address] Attestations: This note is prepared by Tanya Wiseman, acting as Scribe for Mino Wallace. MD [facility]. Mino Wallace. MD [facility]: The scribe's documentation has been prepared under my direction and personally reviewed by me in its entirety. I confirm that the note above accurately reflects all work, treatment, procedures, and medical decision making performed by me. This note will not be viewable in [address]

## 2018-10-29 NOTE — ED NOTES
Assumed care of pt from [address]. Pt resting quietly and in no acute distress at this time. Started rocephin. VSS. Call bell within reach.

## 2018-10-29 NOTE — ED NOTES
Pt provided with blankets and pillows. Appears to be resting comfortably and in no acute distress. Will continue to monitor

## 2018-10-30 ENCOUNTER — APPOINTMENT (OUTPATIENT)
Dept: MRI IMAGING | Age: 83
DRG: 478 | End: 2018-10-30
Attending: HOSPITALIST
Payer: MEDICARE

## 2018-10-30 LAB
ANION GAP SERPL CALC-SCNC: 8 MMOL/L (ref 5–15)
BUN SERPL-MCNC: 8 MG/DL (ref 6–20)
BUN/CREAT SERPL: 19 (ref 12–20)
CALCIUM SERPL-MCNC: 8.4 MG/DL (ref 8.5–10.1)
CHLORIDE SERPL-SCNC: 103 MMOL/L (ref 97–108)
CO2 SERPL-SCNC: 22 MMOL/L (ref 21–32)
CREAT SERPL-MCNC: 0.43 MG/DL (ref 0.55–1.02)
ERYTHROCYTE [DISTWIDTH] IN BLOOD BY AUTOMATED COUNT: 15.2 % (ref 11.5–14.5)
GLUCOSE SERPL-MCNC: 117 MG/DL (ref 65–100)
HCT VFR BLD AUTO: 43.4 % (ref 35–47)
HGB BLD-MCNC: 14.4 G/DL (ref 11.5–16)
MAGNESIUM SERPL-MCNC: 1.9 MG/DL (ref 1.6–2.4)
MCH RBC QN AUTO: 32 PG (ref 26–34)
MCHC RBC AUTO-ENTMCNC: 33.2 G/DL (ref 30–36.5)
MCV RBC AUTO: 96.4 FL (ref 80–99)
NRBC # BLD: 0 K/UL (ref 0–0.01)
NRBC BLD-RTO: 0 PER 100 WBC
PHOSPHATE SERPL-MCNC: 3.2 MG/DL (ref 2.6–4.7)
PLATELET # BLD AUTO: 227 K/UL (ref 150–400)
PMV BLD AUTO: 10.2 FL (ref 8.9–12.9)
POTASSIUM SERPL-SCNC: 3.6 MMOL/L (ref 3.5–5.1)
RBC # BLD AUTO: 4.5 M/UL (ref 3.8–5.2)
SODIUM SERPL-SCNC: 133 MMOL/L (ref 136–145)
WBC # BLD AUTO: 9.6 K/UL (ref 3.6–11)

## 2018-10-30 PROCEDURE — 85027 COMPLETE CBC AUTOMATED: CPT | Performed by: HOSPITALIST

## 2018-10-30 PROCEDURE — 84100 ASSAY OF PHOSPHORUS: CPT | Performed by: HOSPITALIST

## 2018-10-30 PROCEDURE — 74011000258 HC RX REV CODE- 258: Performed by: HOSPITALIST

## 2018-10-30 PROCEDURE — 77030038269 HC DRN EXT URIN PURWCK BARD -A

## 2018-10-30 PROCEDURE — 94760 N-INVAS EAR/PLS OXIMETRY 1: CPT

## 2018-10-30 PROCEDURE — 72146 MRI CHEST SPINE W/O DYE: CPT

## 2018-10-30 PROCEDURE — 80048 BASIC METABOLIC PNL TOTAL CA: CPT | Performed by: HOSPITALIST

## 2018-10-30 PROCEDURE — 83735 ASSAY OF MAGNESIUM: CPT | Performed by: HOSPITALIST

## 2018-10-30 PROCEDURE — 74011250637 HC RX REV CODE- 250/637: Performed by: INTERNAL MEDICINE

## 2018-10-30 PROCEDURE — 36415 COLL VENOUS BLD VENIPUNCTURE: CPT | Performed by: HOSPITALIST

## 2018-10-30 PROCEDURE — 74011250637 HC RX REV CODE- 250/637: Performed by: HOSPITALIST

## 2018-10-30 PROCEDURE — 65270000029 HC RM PRIVATE

## 2018-10-30 PROCEDURE — 72148 MRI LUMBAR SPINE W/O DYE: CPT

## 2018-10-30 PROCEDURE — 74011250636 HC RX REV CODE- 250/636: Performed by: HOSPITALIST

## 2018-10-30 RX ORDER — ACETAMINOPHEN 325 MG/1
325 TABLET ORAL
COMMUNITY

## 2018-10-30 RX ORDER — GRANULES FOR ORAL 3 G/1
3 POWDER ORAL ONCE
Status: COMPLETED | OUTPATIENT
Start: 2018-10-30 | End: 2018-10-30

## 2018-10-30 RX ORDER — HYDROXYZINE 25 MG/1
50 TABLET, FILM COATED ORAL
Status: DISCONTINUED | OUTPATIENT
Start: 2018-10-30 | End: 2018-11-02 | Stop reason: HOSPADM

## 2018-10-30 RX ORDER — CYANOCOBALAMIN 1000 UG/ML
1000 INJECTION, SOLUTION INTRAMUSCULAR; SUBCUTANEOUS
COMMUNITY

## 2018-10-30 RX ORDER — METOPROLOL TARTRATE 25 MG/1
25 TABLET, FILM COATED ORAL EVERY EVENING
Status: DISCONTINUED | OUTPATIENT
Start: 2018-10-30 | End: 2018-11-02 | Stop reason: HOSPADM

## 2018-10-30 RX ORDER — HYDROCHLOROTHIAZIDE 25 MG/1
25 TABLET ORAL DAILY
COMMUNITY

## 2018-10-30 RX ORDER — BISMUTH SUBSALICYLATE 262 MG
1 TABLET,CHEWABLE ORAL DAILY
COMMUNITY

## 2018-10-30 RX ORDER — NITROGLYCERIN 0.4 MG/1
0.4 TABLET SUBLINGUAL
COMMUNITY

## 2018-10-30 RX ORDER — HYDROXYZINE PAMOATE 25 MG/1
50 CAPSULE ORAL
Status: DISCONTINUED | OUTPATIENT
Start: 2018-10-30 | End: 2018-10-30

## 2018-10-30 RX ORDER — METOPROLOL TARTRATE 50 MG/1
50 TABLET ORAL
COMMUNITY

## 2018-10-30 RX ORDER — METOPROLOL TARTRATE 50 MG/1
50 TABLET ORAL DAILY
Status: DISCONTINUED | OUTPATIENT
Start: 2018-10-31 | End: 2018-11-02 | Stop reason: HOSPADM

## 2018-10-30 RX ORDER — HYDROXYZINE PAMOATE 25 MG/1
25 CAPSULE ORAL
Status: DISCONTINUED | OUTPATIENT
Start: 2018-10-30 | End: 2018-10-30

## 2018-10-30 RX ORDER — METOPROLOL TARTRATE 50 MG/1
25 TABLET ORAL EVERY EVENING
COMMUNITY

## 2018-10-30 RX ADMIN — HYDROXYZINE HYDROCHLORIDE 50 MG: 25 TABLET, FILM COATED ORAL at 15:11

## 2018-10-30 RX ADMIN — Medication 10 ML: at 21:29

## 2018-10-30 RX ADMIN — DOCUSATE SODIUM 100 MG: 100 CAPSULE, LIQUID FILLED ORAL at 18:41

## 2018-10-30 RX ADMIN — FOSFOMYCIN TROMETHAMINE 3 G: 3 POWDER ORAL at 18:43

## 2018-10-30 RX ADMIN — METOPROLOL TARTRATE 25 MG: 25 TABLET ORAL at 18:42

## 2018-10-30 RX ADMIN — HYDROXYZINE HYDROCHLORIDE 50 MG: 25 TABLET, FILM COATED ORAL at 21:25

## 2018-10-30 RX ADMIN — DIAZEPAM 2 MG: 2 TABLET ORAL at 18:41

## 2018-10-30 RX ADMIN — DIPHENHYDRAMINE HYDROCHLORIDE 25 MG: 25 CAPSULE ORAL at 07:17

## 2018-10-30 RX ADMIN — ACETAMINOPHEN 650 MG: 325 TABLET ORAL at 07:13

## 2018-10-30 RX ADMIN — AZTREONAM 1 G: 1 INJECTION, POWDER, LYOPHILIZED, FOR SOLUTION INTRAMUSCULAR; INTRAVENOUS at 21:25

## 2018-10-30 RX ADMIN — ACETAMINOPHEN 650 MG: 325 TABLET ORAL at 18:41

## 2018-10-30 RX ADMIN — AZTREONAM 1 G: 1 INJECTION, POWDER, LYOPHILIZED, FOR SOLUTION INTRAMUSCULAR; INTRAVENOUS at 14:10

## 2018-10-30 RX ADMIN — ACETAMINOPHEN 650 MG: 325 TABLET ORAL at 14:09

## 2018-10-30 RX ADMIN — TRAZODONE HYDROCHLORIDE 50 MG: 50 TABLET ORAL at 21:27

## 2018-10-30 RX ADMIN — HYDROXYZINE PAMOATE 25 MG: 25 CAPSULE ORAL at 12:29

## 2018-10-30 RX ADMIN — Medication 10 ML: at 15:12

## 2018-10-30 RX ADMIN — Medication 10 ML: at 07:13

## 2018-10-30 NOTE — PROGRESS NOTES
Problem: Falls - Risk of 
Goal: *Absence of Falls Document Kayy Hinkle Fall Risk and appropriate interventions in the flowsheet. Outcome: Progressing Towards Goal 
Fall Risk Interventions: 
Mobility Interventions: Communicate number of staff needed for ambulation/transfer, Patient to call before getting OOB, Utilize walker, cane, or other assistive device Mentation Interventions: Adequate sleep, hydration, pain control, Door open when patient unattended, More frequent rounding Medication Interventions: Evaluate medications/consider consulting pharmacy Elimination Interventions: Call light in reach, Patient to call for help with toileting needs History of Falls Interventions: Door open when patient unattended, Investigate reason for fall, Room close to nurse's station

## 2018-10-30 NOTE — ROUTINE PROCESS
-Please complete MRI History and Safety Screening Form for this patient using [facility] only under Orders Requiring a Screening Form: 
 

## 2018-10-30 NOTE — PROGRESS NOTES
Rash w/ Ceftriaxone; Aztreonam started. Rash w/ Aztreonam; multiple drug allergies Per discussion w/ Dr. Monica Thorne, will give Fosfomycin 3gm po x1, then reasses tomorrow

## 2018-10-30 NOTE — CONSULTS
Vascular Surgery Consult Note  10/30/2018    Subjective:     Krishan Hernandes is a pleasant 80 y.o.  female who presents to the hospital with complaint of worsening lower back pain. She has a pmhx significant for HTN, thyroid disease, osteoporosis, and chronic pain. She has a recent hx of UTI. Her back pain is exacerbated with movement. Due to her hx of UTI her PCP was concerened about pyelonephritis and referred her to the hospital for evaluation. She had a CT scan of the abd and pelvis that was + for non-obstructing renal calculi, multiple chronic lumbar and thoracic compression fractures, and an incidental finding of a 14mm splenic artery aneurysm. We have been asked to evaluate her aneurysm. Past Medical History  ASHD   -angina   -hx of stent placement   Hypertensive disorder  -hx of MI  HLD   Chronic pain syndrome  -lower back  -fibromyalgia  Osteoporosis   Multiple compression fractures of the thoracic and lumbar spine  -T12  -L1  -L3-5  Thyroid disease  Non-obstructing renal calculi   Splenic artery aneurysm (14mm)  Basal cell carcinoma of the back s/p resection   Insomnia   B12 deficiency  Anxiety     Past Procedural History in addition to above  Cholecystectomy   Right femur ORIF     History reviewed. No pertinent family history. Social History     Tobacco Use    Smoking status: Never Smoker    Smokeless tobacco: Never Used   Substance Use Topics    Alcohol use: No       Patient lives alone and routinely ambulates with a walker. Her son Sue Dumont #[phone] helps her make medical decisions per her report. Prior to Admission medications    Medication Sig Start Date End Date Taking? Authorizing Provider   cyanocobalamin (VITAMIN B12) 1,000 mcg/mL injection 1,000 mcg by IntraMUSCular route every thirty (30) days. Yes Provider, Historical   hydroCHLOROthiazide (HYDRODIURIL) 25 mg tablet Take 25 mg by mouth daily.    Yes Provider, Historical   metoprolol tartrate (LOPRESSOR) 50 mg tablet Take 50 mg by mouth every morning. Yes Provider, Historical   metoprolol tartrate (LOPRESSOR) 50 mg tablet Take 25 mg by mouth every evening. Yes Provider, Historical   acetaminophen (TYLENOL) 325 mg tablet Take 325 mg by mouth every six (6) hours as needed for Pain. Yes Provider, Historical   multivitamin (ONE A DAY) tablet Take 1 Tab by mouth daily. Yes Provider, Historical   amLODIPine (NORVASC) 5 mg tablet Take 5 mg by mouth daily. Yes Provider, Historical   simvastatin (ZOCOR) 10 mg tablet Take 10 mg by mouth daily. Yes Provider, Historical   diazepam (VALIUM) 2 mg tablet Take 2 mg by mouth daily. Yes Provider, Historical   nitroglycerin (NITRODUR) 0.2 mg/hr 1 Patch by TransDERmal route daily. Yes Other, MD Luana   trazodone (DESYREL) 50 mg tablet Take 50 mg by mouth nightly. Yes Other, MD Luana   nitroglycerin (NITROSTAT) 0.4 mg SL tablet 0.4 mg by SubLINGual route every five (5) minutes as needed for Chest Pain. Up to 3 doses. Provider, Historical   Fish Oil-Omega-3 Fatty Acids (FISH OIL) 360-1,200 mg Cap Take 1 Cap by mouth two (2) times a day. 12/2/10   Provider, Historical     Allergies   Allergen Reactions    Ceftriaxone Hives    Celebrex [Celecoxib] Rash    Ciprofloxacin Rash    Ivp [Fd And C Blue No.1] Rash and Swelling    Macrobid [Nitrofurantoin Monohyd/M-Cryst] Swelling    Niaspan [Niacin] Palpitations    Pcn [Penicillins] Rash and Swelling    Seafood [Shellfish Containing Products] Rash and Swelling      Review of Systems   Constitutional: Positive for activity change and fatigue. Negative for chills and fever. HENT: Negative. Eyes: Negative. Respiratory: Negative for cough, chest tightness and shortness of breath. Cardiovascular: Negative for chest pain, palpitations and leg swelling. Gastrointestinal: Negative for nausea and vomiting. Endocrine: Negative. Genitourinary: Negative. Musculoskeletal: Positive for arthralgias.  Negative for gait problem, joint swelling and myalgias. Skin: Negative for color change and wound. Allergic/Immunologic: Negative. Neurological: Positive for weakness. Hematological: Negative. Psychiatric/Behavioral: Negative. Objective:       Patient Vitals for the past 24 hrs:   BP Temp Pulse Resp SpO2 Height Weight   10/30/18 0800 138/69 97.4 °F (36.3 °C) 64 18 95 %     10/30/18 0440 151/76 97.8 °F (36.6 °C) 68 16 96 %     10/29/18 2256 162/69 98.3 °F (36.8 °C) 72 16 92 %     10/29/18 2200 137/56 98.3 °F (36.8 °C) 64 16 93 %     10/29/18 2130 (!) 146/97  68 15 94 %     10/29/18 2100 150/61  68 15 94 %     10/29/18 2030 157/61  70 18 93 %     10/29/18 2004     93 %     10/29/18 1930 179/66    92 %     10/29/18 1916     94 %     10/29/18 1915 192/73         10/29/18 1830 157/53    92 %     10/29/18 1800 144/67    92 %     10/29/18 1717     94 %     10/29/18 1601 180/80    95 %     10/29/18 1532 193/74 98.5 °F (36.9 °C) 79 16 95 % 4' 11\" (1.499 m) 56.6 kg (124 lb 12.5 oz)     Physical Exam   Constitutional: She is oriented to person, place, and time. Frail, elderly CF who appears in moderate pain. HENT:   Head: Normocephalic and atraumatic. Eyes: EOM are normal. Pupils are equal, round, and reactive to light. Neck: Normal range of motion. Neck supple. Cardiovascular: Normal rate and regular rhythm. Pulses:       Femoral pulses are 2+ on the right side, and 2+ on the left side. Feet are warm     Pulmonary/Chest: Effort normal. No respiratory distress. Abdominal: Soft. She exhibits no distension and no mass. There is no tenderness. No evidence of splenomegaly. Musculoskeletal: Normal range of motion. Neurological: She is alert and oriented to person, place, and time. Skin: Skin is warm and dry. There is pallor.    Psychiatric: Her behavior is normal. Judgment and thought content normal.       Pertinent Test Results:   Recent Results (from the past 24 hour(s))   URINALYSIS W/ REFLEX CULTURE    Collection Time: 10/29/18  3:50 PM   Result Value Ref Range    Color YELLOW/STRAW      Appearance CLOUDY (A) CLEAR      Specific gravity 1.008 1.003 - 1.030      pH (UA) 7.5 5.0 - 8.0      Protein TRACE (A) NEG mg/dL    Glucose NEGATIVE  NEG mg/dL    Ketone TRACE (A) NEG mg/dL    Bilirubin NEGATIVE  NEG      Blood NEGATIVE  NEG      Urobilinogen 0.2 0.2 - 1.0 EU/dL    Nitrites POSITIVE (A) NEG      Leukocyte Esterase LARGE (A) NEG      WBC  0 - 4 /hpf    RBC 0-5 0 - 5 /hpf    Epithelial cells FEW FEW /lpf    Bacteria 4+ (A) NEG /hpf    UA:UC IF INDICATED URINE CULTURE ORDERED (A) CNI      Hyaline cast 0-2 0 - 5 /lpf   CULTURE, URINE    Collection Time: 10/29/18  3:50 PM   Result Value Ref Range    Special Requests: NO SPECIAL REQUESTS  Reflexed from [id number]        [place] Count >100,000  COLONIES/mL        Culture result: GRAM NEGATIVE RODS (A)     CBC WITH AUTOMATED DIFF    Collection Time: 10/29/18  4:00 PM   Result Value Ref Range    WBC 10.5 3.6 - 11.0 K/uL    RBC 4.82 3.80 - 5.20 M/uL    HGB 15.5 11.5 - 16.0 g/dL    HCT 44.9 35.0 - 47.0 %    MCV 93.2 80.0 - 99.0 FL    MCH 32.2 26.0 - 34.0 PG    MCHC 34.5 30.0 - 36.5 g/dL    RDW 15.1 (H) 11.5 - 14.5 %    PLATELET 322 601 - 905 K/uL    MPV 10.3 8.9 - 12.9 FL    NRBC 0.0 0  WBC    ABSOLUTE NRBC 0.00 0.00 - 0.01 K/uL    NEUTROPHILS 85 (H) 32 - 75 %    LYMPHOCYTES 7 (L) 12 - 49 %    MONOCYTES 6 5 - 13 %    EOSINOPHILS 1 0 - 7 %    BASOPHILS 0 0 - 1 %    IMMATURE GRANULOCYTES 1 (H) 0.0 - 0.5 %    ABS. NEUTROPHILS 9.0 (H) 1.8 - 8.0 K/UL    ABS. LYMPHOCYTES 0.7 (L) 0.8 - 3.5 K/UL    ABS. MONOCYTES 0.6 0.0 - 1.0 K/UL    ABS. EOSINOPHILS 0.1 0.0 - 0.4 K/UL    ABS. BASOPHILS 0.0 0.0 - 0.1 K/UL    ABS. IMM.  GRANS. 0.1 (H) 0.00 - 0.04 K/UL    DF AUTOMATED      RBC COMMENTS NORMOCYTIC, NORMOCHROMIC     METABOLIC PANEL, COMPREHENSIVE    Collection Time: 10/29/18  4:00 PM   Result Value Ref Range Sodium 131 (L) 136 - 145 mmol/L    Potassium 3.5 3.5 - 5.1 mmol/L    Chloride 100 97 - 108 mmol/L    CO2 22 21 - 32 mmol/L    Anion gap 9 5 - 15 mmol/L    Glucose 113 (H) 65 - 100 mg/dL    BUN 10 6 - 20 MG/DL    Creatinine 0.48 (L) 0.55 - 1.02 MG/DL    BUN/Creatinine ratio 21 (H) 12 - 20      GFR est AA >60 >60 ml/min/1.73m2    GFR est non-AA >60 >60 ml/min/1.73m2    Calcium 8.7 8.5 - 10.1 MG/DL    Bilirubin, total 0.6 0.2 - 1.0 MG/DL    ALT (SGPT) 23 12 - 78 U/L    AST (SGOT) 15 15 - 37 U/L    Alk.  phosphatase 97 45 - 117 U/L    Protein, total 7.9 6.4 - 8.2 g/dL    Albumin 3.7 3.5 - 5.0 g/dL    Globulin 4.2 (H) 2.0 - 4.0 g/dL    A-G Ratio 0.9 (L) 1.1 - 2.2     CULTURE, BLOOD, PAIRED    Collection Time: 10/29/18  5:47 PM   Result Value Ref Range    Special Requests: NO SPECIAL REQUESTS      Culture result: NO GROWTH AFTER 13 HOURS     LACTIC ACID    Collection Time: 10/29/18  5:47 PM   Result Value Ref Range    Lactic acid 1.1 0.4 - 2.0 MMOL/L   METABOLIC PANEL, BASIC    Collection Time: 10/30/18  4:44 AM   Result Value Ref Range    Sodium 133 (L) 136 - 145 mmol/L    Potassium 3.6 3.5 - 5.1 mmol/L    Chloride 103 97 - 108 mmol/L    CO2 22 21 - 32 mmol/L    Anion gap 8 5 - 15 mmol/L    Glucose 117 (H) 65 - 100 mg/dL    BUN 8 6 - 20 MG/DL    Creatinine 0.43 (L) 0.55 - 1.02 MG/DL    BUN/Creatinine ratio 19 12 - 20      GFR est AA >60 >60 ml/min/1.73m2    GFR est non-AA >60 >60 ml/min/1.73m2    Calcium 8.4 (L) 8.5 - 10.1 MG/DL   CBC W/O DIFF    Collection Time: 10/30/18  4:44 AM   Result Value Ref Range    WBC 9.6 3.6 - 11.0 K/uL    RBC 4.50 3.80 - 5.20 M/uL    HGB 14.4 11.5 - 16.0 g/dL    HCT 43.4 35.0 - 47.0 %    MCV 96.4 80.0 - 99.0 FL    MCH 32.0 26.0 - 34.0 PG    MCHC 33.2 30.0 - 36.5 g/dL    RDW 15.2 (H) 11.5 - 14.5 %    PLATELET 499 649 - 441 K/uL    MPV 10.2 8.9 - 12.9 FL    NRBC 0.0 0  WBC    ABSOLUTE NRBC 0.00 0.00 - 0.01 K/uL   MAGNESIUM    Collection Time: 10/30/18  4:44 AM   Result Value Ref Range Magnesium 1.9 1.6 - 2.4 mg/dL   PHOSPHORUS    Collection Time: 10/30/18  4:44 AM   Result Value Ref Range    Phosphorus 3.2 2.6 - 4.7 MG/DL           Assessmen/Plan:     Consult problem  Splenic artery aneurysm  -1.4cm    This is small and likely not a contributing factor in her acute on chronic back pain. She is not of child bearing age and it is less than 2cm. No further vascular evaluation, procedure, or follow up needed. This was explained to the patient. Active problems:  Acute on chronic pain syndrome   Multiple compression fractures of the thoracic and lumbar spine  Osteoporosis/Osteopenia  -T12  -L1  -L3-5  -progression of L4 collapse is likely the source of her acute pain possible exacerbated by recent UTI  -plan for MRI and kyphoplasty with IR  Anxiety with benzodiazepine dependence  -home medication Valium ordered    Insomnia    UTI  -she did not meet SIRs criteria on arrival   -hx of non-obstructing renal calculi   -urine culture + for > 100,000 gram neg rods  -CT unremarkable for pyelonephritis   -antibx per primary team  Hyponatremia   ASHD  Hypertensive disorder   HLD   -hx angina. Currently CP free. -currently on ASA, statin, ACE. Patient was not taking a BBlocker prior to admission. Thyroid disease    VTE prophylaxis  Held for possible procedure  SCDs     Dispostion   TBD after PT evaluation    Vascular surgery signing off. We appreciate the opportunity to participate in the care of Ms Ann. No further vascular evaluation, procedure, or follow up needed.         Signed By: Simin Obrien NP     October 30, 2018

## 2018-10-30 NOTE — PROGRESS NOTES
Pharmacy Medication Reconciliation The patient was interviewed regarding current PTA medication list, use and drug allergies; Patient's son was present in room and obtained permission from patient to discuss drug regimen with visitor(s) present. The patient was questioned regarding use of any other inhalers, topical products, over the counter medications, herbal medications, vitamin products or ophthalmic/nasal/otic medication use. Allergy Update: No Changes, Mentioned strawberries Recommendations/Findings: The following amendments were made to the patient's active medication list [place] [address] [address] [place]:  
1) Additions: - Vitamin B12 injection 
    - HCTZ 25 mg - Metoprolol tartrate - Acetaminophen - Multivitamin 2) Deletions:  
    - Captopril - according to [facility] [employer] [place] this hasn't been picked up in past 3 months - Lidocaine patch - according to son she doesn't use this one anymore - Tramadol - no longer taking was prescribed in a different admission - Aspirin 3) Changes:  
    - Changed diazepam directions from Q12H to every day - per [facility] [place] 4) Additional Information - Patient is poor historian due to her in regards to her medication management. Her son was also not able to contribute much, but did mention a list from her doctor was given to someone during admission (unable to determine who). - Called the [facility] [employer] [facility] at where she fills at to acquire/clarify any medications the last three months. - She was confident she was not taking baby aspirin currently. - Did not remove fish oil as these possibly are over-the-counter, but were not picked up from [facility] [employer] [facility]. Nitroglycerin SL tabs, cannot verify from patient if she is using them or has them - See below for directions for her metoprolol added as separate entries.  
    - HCTZ she was due for refill on 10/17, but has not picked it up yet. Last fill was 6/26. -Clarified PTA med list with patient, patient's son, and [employer] [place]. PTA medication list was corrected to the following:  
 
Prior to Admission Medications Prescriptions Last Dose Informant Patient Reported? Taking? Fish Oil-Omega-3 Fatty Acids (FISH OIL) 360-1,200 mg Cap   Yes No  
Sig: Take 1 Cap by mouth two (2) times a day. acetaminophen (TYLENOL) 325 mg tablet   Yes Yes Sig: Take 325 mg by mouth every six (6) hours as needed for Pain. amLODIPine (NORVASC) 5 mg tablet   Yes Yes Sig: Take 5 mg by mouth daily. cyanocobalamin (VITAMIN B12) 1,000 mcg/mL injection   Yes Yes Si,000 mcg by IntraMUSCular route every thirty (30) days. diazepam (VALIUM) 2 mg tablet   Yes Yes Sig: Take 2 mg by mouth daily. hydroCHLOROthiazide (HYDRODIURIL) 25 mg tablet   Yes Yes Sig: Take 25 mg by mouth daily. metoprolol tartrate (LOPRESSOR) 50 mg tablet   Yes Yes Sig: Take 50 mg by mouth every morning. metoprolol tartrate (LOPRESSOR) 50 mg tablet   Yes Yes Sig: Take 25 mg by mouth every evening.  
multivitamin (ONE A DAY) tablet   Yes Yes Sig: Take 1 Tab by mouth daily. nitroglycerin (NITRODUR) 0.2 mg/hr   Yes Yes Si Patch by TransDERmal route daily. nitroglycerin (NITROSTAT) 0.4 mg SL tablet   Yes No  
Si.4 mg by SubLINGual route every five (5) minutes as needed for Chest Pain. Up to 3 doses. simvastatin (ZOCOR) 10 mg tablet   Yes Yes Sig: Take 10 mg by mouth daily. trazodone (DESYREL) 50 mg tablet   Yes Yes Sig: Take 50 mg by mouth nightly. Facility-Administered Medications: None Thank you, Dean Muñoz, PharmD Candidate [id number]

## 2018-10-30 NOTE — PROGRESS NOTES
Problem: Falls - Risk of 
Goal: *Absence of Falls Document Nelson Tino Fall Risk and appropriate interventions in the flowsheet. Outcome: Progressing Towards Goal 
Fall Risk Interventions: 
Mobility Interventions: Communicate number of staff needed for ambulation/transfer, Patient to call before getting OOB, Utilize walker, cane, or other assistive device Mentation Interventions: Adequate sleep, hydration, pain control, Door open when patient unattended, More frequent rounding Medication Interventions: Evaluate medications/consider consulting pharmacy Elimination Interventions: Call light in reach, Patient to call for help with toileting needs History of Falls Interventions: Door open when patient unattended, Investigate reason for fall, Room close to nurse's station

## 2018-10-30 NOTE — PROGRESS NOTES
Spoke with Dr. Scott Holcomb, advised her pt has broken out in a hive like rash. Patient stated that this is how she reacts to medications that she is allergic to. She is not in any respiratory distress, she had received Rocephin recently, that is being d/c and added to her allergy list, she also has order for Benadryl. Will continue to monitor. 2:32 am- Pts symptoms of itching have been relieved, sleeping soundly

## 2018-10-30 NOTE — PROGRESS NOTES
Hospitalist Progress Note NAME: Dannielle Ann :  1927 MRN:  [id number] Assessment / Plan: 
Osteoporotic Compression fracture causing inability to ambulate Intractable pain  
-Xray: Multilevel fractures with progressive collapse of L4. Osteopenia and a very Punctate bilateral nonobstructing renal stones. No hydronephrosis 
-CT: Multiple chronic appearing lower thoracic and lumbar compression deformities 
-pain management: tylenol scheduled + oxy prn  
-IR for kyphoplasty  
-MRI lumbar and thoracic  
-PT/OT  
  
Uncomplicated UTI  
-Ucx growing GNR 
-allergic to rocephin (rash) and now aztreonam 
-will give fosfomycin  x1 Rash 
-due to allergies to rocephin 
-atarax/benadryl prn Hyponatremia  
-resolved with IVF 
-holding HCTZ 
  
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 
-evaluated by vascular surg, no further vascular evaluation/procedure. Follow up outpt as needed. 
  
HTN/ CAD ( h/o MI with stents ) -BP stable 
-hold HCTZ due to hyponatremia  
-cont norvasc, metoprolol 
  
Fibromyalgia, per pt takes valium prn; trazodone for sleep HLP, cont zocor  
  
  
 
Code Status: Full code d/w pt and son at bedside by my partner Surrogate Decision Maker: son   
DVT Prophylaxis:  SCDs. for possible kyphoplasty GI Prophylaxis: not indicated  
Baseline: lives alone; ambulating with cane; has 1 son Subjective: Chief Complaint / Reason for Physician Visit Pt seen at bedside. Complains of rash not improving with benadryl. Rash appears to be due to drug allergies to rocephin. Denies pain. Discussed with RN events overnight. Review of Systems: 
Symptom Y/N Comments  Symptom Y/N Comments Fever/Chills n   Chest Pain n   
Poor Appetite    Edema Cough    Abdominal Pain Sputum    Joint Pain SOB/REBOLLAR n   Pruritis/Rash y   
Nausea/vomit n   Tolerating PT/OT Diarrhea    Tolerating Diet Constipation    Other Could NOT obtain due to:   
 
Objective: VITALS:  
Last 24hrs VS reviewed since prior progress note. Most recent are: 
Patient Vitals for the past 24 hrs: 
 Temp Pulse Resp BP SpO2  
10/30/18 1115 97.9 °F (36.6 °C) 65 18 144/69 95 % 10/30/18 0800 97.4 °F (36.3 °C) 64 18 138/69 95 % 10/30/18 0440 97.8 °F (36.6 °C) 68 16 151/76 96 % 10/29/18 2256 98.3 °F (36.8 °C) 72 16 162/69 92 % 10/29/18 2200 98.3 °F (36.8 °C) 64 16 137/56 93 % 10/29/18 2130  68 15 (!) 146/97 94 % 10/29/18 2100  68 15 150/61 94 % 10/29/18 2030  70 18 157/61 93 % 10/29/18 2004     93 % 10/29/18 1930    179/66 92 % 10/29/18 1916     94 % 10/29/18 1915    192/73   
10/29/18 1830    157/53 92 % 10/29/18 1800    144/67 92 % 10/29/18 1717     94 % 10/29/18 1601    180/80 95 % 10/29/18 1532 98.5 °F (36.9 °C) 79 16 193/74 95 % Intake/Output Summary (Last 24 hours) at 10/30/2018 1321 Last data filed at 10/29/2018 2226 Gross per 24 hour Intake  Output 200 ml Net -200 ml PHYSICAL EXAM: 
General: WD, WN. Alert, cooperative, no acute distress   
EENT:  EOMI. Anicteric sclerae. MMM Resp:  CTA bilaterally, no wheezing or rales. No accessory muscle use CV:  Regular  rhythm,  No edema GI:  Rash on abd area. Soft, Non distended, Non tender.  +BS Neurologic:  Alert and oriented X 3, normal speech, Psych:   Not anxious nor agitated Skin:  No rashes. No jaundice Reviewed most current lab test results and cultures  YES Reviewed most current radiology test results   YES Review and summation of old records today    NO Reviewed patient's current orders and MAR    YES 
PMH/SH reviewed - no change compared to H&P 
________________________________________________________________________ Care Plan discussed with: 
  Comments Patient x Family RN x Care Manager Consultant                   Multidiciplinary team rounds were held today with case manager, nursing, pharmacist and clinical coordinator. Patient's plan of care was discussed; medications were reviewed and discharge planning was addressed. ________________________________________________________________________ Total NON critical care TIME:  35   Minutes Total CRITICAL CARE TIME Spent:   Minutes non procedure based Comments >50% of visit spent in counseling and coordination of care    
________________________________________________________________________ David Harris MD  
 
Procedures: see electronic medical records for all procedures/Xrays and details which were not copied into this note but were reviewed prior to creation of Plan. LABS: 
I reviewed today's most current labs and imaging studies. Pertinent labs include: 
Recent Labs 10/30/18 
0444 10/29/18 
1600 WBC 9.6 10.5 HGB 14.4 15.5 HCT 43.4 44.9  255 Recent Labs 10/30/18 
0444 10/29/18 
1600 * 131*  
K 3.6 3.5  100 CO2 22 22 * 113* BUN 8 10 CREA 0.43* 0.48* CA 8.4* 8.7 MG 1.9  --   
PHOS 3.2  --   
ALB  --  3.7 TBILI  --  0.6 SGOT  --  15 ALT  --  23 Signed: David Harris MD

## 2018-10-30 NOTE — PROGRESS NOTES
Chart reviewed. Per OT note: MD recommending deferring therapy pending MRI results. Pt is scheduled for MRI today, possible kypho for tomorrow. Will follow up tomorrow. Noted orthopedics has not been consulted.   
 
Keon Concepcion, PT

## 2018-10-30 NOTE — H&P
Hospitalist Admission NoteNAME: Twin Ann :  1927 MRN:  [id number] Date/Time:  10/29/2018 9:27 PM 
 
Patient PCP: Evelyn Diaz MD 
______________________________________________________________________ Given the patient's current clinical presentation, I have a high level of concern for decompensation if discharged from the emergency department. Complex decision making was performed, which includes reviewing the patient's available past medical records, laboratory results, and x-ray films. My assessment of this patient's clinical condition and my plan of care is as follows. Assessment / Plan: 
Osteoporotic Compression fracture causing inability to ambulate Intractable pain  
-Xray: Multilevel fractures with progressive collapse of L4. Osteopenia and a very Punctate bilateral nonobstructing renal stones. No hydronephrosis CT: Multiple chronic appearing lower thoracic and lumbar compression deformities 
-pain management: tylenol scheduled + oxy prn With bowel regiment to prevent constipation  
-consult IR for kyphoplasty  
-MRI lumbar and thoracic  
-PT/OT  
 
UTI  
-empiric CTX pending UC Addendum: possible allergic reaction to CTX ( broke in rash) --. Hyponatremia 
-IVF 
-monitor 14 mm splenic artery aneurysm 
-by CT, will ask vascular to review HTN/ CAD ( h/o MI with stents ) -BP stable 
-pharmacy consulted for med rec 
-cont norvasc/ captopril ( per PCP note)  
  Fibromyalgia, per pt takes valium prn; trazodone for sleep HLP, cont zocor Pharmacy consulted for med rec  
 
  
Code Status: Full code d/w pt and son at bedside but would not want to remain in vegetable state Surrogate Decision Maker: son  
  
DVT Prophylaxis:  SCD ( pending IR) GI Prophylaxis: not indicated 
  
Baseline: lives alone; ambulating with cane; has 1 son Subjective: CHIEF COMPLAINT: back pain HISTORY OF PRESENT ILLNESS:    
 Kendell Llanes is a 80 y.o.  female who presents with above complaints. Pt started with back pain 1 week ago. She denies trauma or fall. Pain was especially worsening in the last 3 days. No dizziness. Pain is exacerbated with movement. Pain is getting better if she is staying still. Pt went to see her PCP today. Her pain was so severe in the office that PCP referred her to ED. Pt ambulates with a walker at baseline. No dysuria/fever/chills. We were asked to admit for work up and evaluation of the above problems. Past Medical History:  
Diagnosis Date  Angina   
 has had mi  Arrhythmia  Cancer ([facility] [address]   
 basal cell removed form back  Chronic pain   
 fibromyalgia  Hypertension  Insomnia  Thyroid disease Past Surgical History:  
Procedure Laterality Date  HX CHOLECYSTECTOMY  HX CORONARY STENT PLACEMENT    
 HX OTHER SURGICAL    
 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  
  
 
Pertinent family history: pt cannot recall Allergies Allergen Reactions  Celebrex [Celecoxib] Rash  Ciprofloxacin Rash  Ivp [Fd And C Blue No.1] Rash and Swelling  Macrobid [Nitrofurantoin Monohyd/M-Cryst] Swelling  Niaspan [Niacin] Palpitations  Pcn [Penicillins] Rash and Swelling  Seafood [Shellfish Containing Products] Rash and Swelling Prior to Admission medications Medication Sig Start Date End Date Taking? Authorizing Provider  
lidocaine (SALONPAS/ASPERCREME) 4 % patch Take as directed 5/13/18   Cindy Pal DO  
traMADol (ULTRAM) 50 mg tablet Take 1 Tab by mouth every six (6) hours as needed for Pain. Max Daily Amount: 200 mg.  Indications: Pain 5/13/18   Cindy Pal DO  
furosemide (LASIX) 40 mg tablet Just two more days 2/9/16   Kajal Riggins MD  
 simvastatin (ZOCOR) 10 mg tablet Take 10 mg by mouth daily. Provider, Historical  
cloNIDine HCl (CATAPRES) 0.1 mg tablet Take 0.1 mg by mouth nightly. Provider, Historical  
diazepam (VALIUM) 2 mg tablet Take 2 mg by mouth every twelve (12) hours. Provider, Historical  
nitroglycerin (NITRODUR) 0.2 mg/hr 1 Patch by TransDERmal route daily. Luana Corrigan MD  
trazodone (DESYREL) 50 mg tablet Take 50 mg by mouth nightly. Luana Corrigan MD  
aspirin 81 mg tablet Take 81 mg by mouth daily. 12/2/10   Luana Corrigan MD  
nitroglycerin (NITROSTAT) 0.4 mg SL tablet 0.4 mg by SubLINGual route every five (5) minutes as needed. Luana Corrigan MD  
metoprolol (LOPRESSOR) 50 mg tablet Take 25 mg by mouth every morning. 12/2/10   Provider, Sina  
metoprolol (LOPRESSOR) 50 mg tablet Take 50 mg by mouth every evening. 12/2/10   Provider, Historical  
Fish Oil-Omega-3 Fatty Acids (FISH OIL) 360-1,200 mg Cap Take 1 Cap by mouth two (2) times a day. 12/2/10   Provider, Historical  
 
 
REVIEW OF SYSTEMS:    
I am not able to complete the review of systems because: The patient is intubated and sedated The patient has altered mental status due to his acute medical problems The patient has baseline aphasia from prior stroke(s) The patient has baseline dementia and is not reliable historian The patient is in acute medical distress and unable to provide information Total of 12 systems reviewed as follows:   
   POSITIVE= underlined text  Negative = text not underlined General:  fever, chills, sweats, generalized weakness, weight loss/gain,  
   loss of appetite Eyes:    blurred vision, eye pain, loss of vision, double vision ENT:    rhinorrhea, pharyngitis Respiratory:   cough, sputum production, SOB, REBOLLAR, wheezing, pleuritic pain  
Cardiology:   chest pain, palpitations, orthopnea, PND, edema, syncope Gastrointestinal:  abdominal pain , N/V, diarrhea, dysphagia, constipation, bleeding Genitourinary:  frequency, urgency, dysuria, hematuria, incontinence Muskuloskeletal :  arthralgia, myalgia, back pain Hematology:  easy bruising, nose or gum bleeding, lymphadenopathy Dermatological: rash, ulceration, pruritis, color change / jaundice Endocrine:   hot flashes or polydipsia Neurological:  headache, dizziness, confusion, focal weakness, paresthesia, Speech difficulties, memory loss, gait difficulty Psychological: Feelings of anxiety, depression, agitation Objective: VITALS:   
Visit Vitals /66 Pulse 79 Temp 98.5 °F (36.9 °C) Resp 16 Ht 4' 11\" (1.499 m) Wt 56.6 kg (124 lb 12.5 oz) SpO2 93% BMI 25.20 kg/m² PHYSICAL EXAM: 
 
General:    Alert, cooperative, no distress, appears stated age. HEENT: Atraumatic, anicteric sclerae, pink conjunctivae No oral ulcers, mucosa moist, throat clear, dentition fair Neck:  Supple, symmetrical,  thyroid: non tender Lungs:   Clear to auscultation bilaterally. No Wheezing or Rhonchi. No rales. Chest wall:  No tenderness  No Accessory muscle use. Heart:   Regular  rhythm,  No  murmur   No edema Abdomen:   Soft, non-tender. Not distended. Bowel sounds normal 
Extremities: No cyanosis. No clubbing,   
  Skin turgor normal, Capillary refill normal, Radial dial pulse 2+ Back:                Mild TTP lumbar spine Skin:     Not pale. Not Jaundiced  No rashes Psych:  Good insight. Not depressed. Not anxious or agitated. Neurologic: EOMs intact. No facial asymmetry. No aphasia or slurred speech. Symmetrical strength, Sensation grossly intact. Alert and oriented X 4.  
 
_______________________________________________________________________ Care Plan discussed with: 
  Comments Patient y Family  y Son bedside RN y   
Care Manager Consultant:  jovana ED provider   
_______________________________________________________________________ Expected  Disposition:  
Home with Family HH/PT/OT/RN y  
SNF/LTC y  
[place]   
________________________________________________________________________ TOTAL TIME:  65 Minutes Critical Care Provided     Minutes non procedure based Comments Reviewed previous records  
>50% of visit spent in counseling and coordination of care  Discussion with patient and/or family and questions answered 
  
 
________________________________________________________________________ Signed: Mattie Tobar MD 
 
Procedures: see electronic medical records for all procedures/Xrays and details which were not copied into this note but were reviewed prior to creation of Plan. LAB DATA REVIEWED:   
Recent Results (from the past 24 hour(s)) URINALYSIS W/ REFLEX CULTURE Collection Time: 10/29/18  3:50 PM  
Result Value Ref Range Color YELLOW/STRAW Appearance CLOUDY (A) CLEAR Specific gravity 1.008 1.003 - 1.030    
 pH (UA) 7.5 5.0 - 8.0 Protein TRACE (A) NEG mg/dL Glucose NEGATIVE  NEG mg/dL Ketone TRACE (A) NEG mg/dL Bilirubin NEGATIVE  NEG Blood NEGATIVE  NEG Urobilinogen 0.2 0.2 - 1.0 EU/dL Nitrites POSITIVE (A) NEG Leukocyte Esterase LARGE (A) NEG    
 WBC  0 - 4 /hpf  
 RBC 0-5 0 - 5 /hpf Epithelial cells FEW FEW /lpf Bacteria 4+ (A) NEG /hpf  
 UA:UC IF INDICATED URINE CULTURE ORDERED (A) CNI Hyaline cast 0-2 0 - 5 /lpf  
CBC WITH AUTOMATED DIFF Collection Time: 10/29/18  4:00 PM  
Result Value Ref Range WBC 10.5 3.6 - 11.0 K/uL  
 RBC 4.82 3.80 - 5.20 M/uL  
 HGB 15.5 11.5 - 16.0 g/dL HCT 44.9 35.0 - 47.0 % MCV 93.2 80.0 - 99.0 FL  
 MCH 32.2 26.0 - 34.0 PG  
 MCHC 34.5 30.0 - 36.5 g/dL  
 RDW 15.1 (H) 11.5 - 14.5 % PLATELET 457 544 - 279 K/uL MPV 10.3 8.9 - 12.9 FL  
 NRBC 0.0 0  WBC ABSOLUTE NRBC 0.00 0.00 - 0.01 K/uL NEUTROPHILS 85 (H) 32 - 75 % LYMPHOCYTES 7 (L) 12 - 49 % MONOCYTES 6 5 - 13 % EOSINOPHILS 1 0 - 7 % BASOPHILS 0 0 - 1 % IMMATURE GRANULOCYTES 1 (H) 0.0 - 0.5 % ABS. NEUTROPHILS 9.0 (H) 1.8 - 8.0 K/UL  
 ABS. LYMPHOCYTES 0.7 (L) 0.8 - 3.5 K/UL  
 ABS. MONOCYTES 0.6 0.0 - 1.0 K/UL  
 ABS. EOSINOPHILS 0.1 0.0 - 0.4 K/UL  
 ABS. BASOPHILS 0.0 0.0 - 0.1 K/UL  
 ABS. IMM. GRANS. 0.1 (H) 0.00 - 0.04 K/UL  
 DF AUTOMATED    
 RBC COMMENTS NORMOCYTIC, NORMOCHROMIC METABOLIC PANEL, COMPREHENSIVE Collection Time: 10/29/18  4:00 PM  
Result Value Ref Range Sodium 131 (L) 136 - 145 mmol/L Potassium 3.5 3.5 - 5.1 mmol/L Chloride 100 97 - 108 mmol/L  
 CO2 22 21 - 32 mmol/L Anion gap 9 5 - 15 mmol/L Glucose 113 (H) 65 - 100 mg/dL BUN 10 6 - 20 MG/DL Creatinine 0.48 (L) 0.55 - 1.02 MG/DL  
 BUN/Creatinine ratio 21 (H) 12 - 20 GFR est AA >60 >60 ml/min/1.73m2 GFR est non-AA >60 >60 ml/min/1.73m2 Calcium 8.7 8.5 - 10.1 MG/DL Bilirubin, total 0.6 0.2 - 1.0 MG/DL  
 ALT (SGPT) 23 12 - 78 U/L  
 AST (SGOT) 15 15 - 37 U/L Alk. phosphatase 97 45 - 117 U/L Protein, total 7.9 6.4 - 8.2 g/dL Albumin 3.7 3.5 - 5.0 g/dL Globulin 4.2 (H) 2.0 - 4.0 g/dL A-G Ratio 0.9 (L) 1.1 - 2.2 LACTIC ACID Collection Time: 10/29/18  5:47 PM  
Result Value Ref Range  Lactic acid 1.1 0.4 - 2.0 MMOL/L

## 2018-10-30 NOTE — ED NOTES
TRANSFER - OUT REPORT: 
 
Verbal report given to Sophia Trinidad RN (name) on [employer]  being transferred to ortho (unit) for routine progression of care Report consisted of patients Situation, Background, Assessment and  
Recommendations(SBAR). Information from the following report(s) SBAR, Kardex, ED Summary, Intake/Output, MAR and Recent Results was reviewed with the receiving nurse. Lines:  
Peripheral IV 10/29/18 Left Antecubital (Active) Site Assessment Clean, dry, & intact 10/29/2018  4:12 PM  
Phlebitis Assessment 0 10/29/2018  4:12 PM  
Infiltration Assessment 0 10/29/2018  4:12 PM  
Hub Color/Line Status Capped;Flushed 10/29/2018  4:12 PM  
   
Peripheral IV 10/29/18 Right Forearm (Active) Site Assessment Clean, dry, & intact 10/29/2018  5:58 PM  
Phlebitis Assessment 0 10/29/2018  5:58 PM  
Infiltration Assessment 0 10/29/2018  5:58 PM  
Dressing Status Clean, dry, & intact 10/29/2018  5:58 PM  
Dressing Type 4 X 4;Transparent 10/29/2018  5:58 PM  
Hub Color/Line Status Pink;Flushed;Patent 10/29/2018  5:58 PM  
  
 
Opportunity for questions and clarification was provided. Patient transported with: 
 [employer]

## 2018-10-30 NOTE — PROGRESS NOTES
Medication History completed. Please consider the following: - Discontinue Captopril - Resume Metoprolol 50mg daily and 25mg evening - If appropriate, resume Hydrochlorothiazide 25mg daily

## 2018-10-30 NOTE — PROGRESS NOTES
Occupational Therapy Chart reviewed. Spoke with MD during interdisciplinary rounds. Pt is scheduled for MRI today, possible kypho for tomorrow. Per hospitalist, hold OT/PT evlauations until results of MRI. Will follow up tomorrow. Noted orthopedics has not been consulted. Thank you.  Stephanie Sanchez, OT

## 2018-10-31 ENCOUNTER — APPOINTMENT (OUTPATIENT)
Dept: INTERVENTIONAL RADIOLOGY/VASCULAR | Age: 83
DRG: 478 | End: 2018-10-31
Attending: HOSPITALIST
Payer: MEDICARE

## 2018-10-31 LAB
ANION GAP SERPL CALC-SCNC: 9 MMOL/L (ref 5–15)
BACTERIA SPEC CULT: ABNORMAL
BUN SERPL-MCNC: 12 MG/DL (ref 6–20)
BUN/CREAT SERPL: 24 (ref 12–20)
CALCIUM SERPL-MCNC: 7.7 MG/DL (ref 8.5–10.1)
CC UR VC: ABNORMAL
CHLORIDE SERPL-SCNC: 105 MMOL/L (ref 97–108)
CO2 SERPL-SCNC: 22 MMOL/L (ref 21–32)
CREAT SERPL-MCNC: 0.49 MG/DL (ref 0.55–1.02)
GLUCOSE SERPL-MCNC: 106 MG/DL (ref 65–100)
POTASSIUM SERPL-SCNC: 3.5 MMOL/L (ref 3.5–5.1)
SERVICE CMNT-IMP: ABNORMAL
SODIUM SERPL-SCNC: 136 MMOL/L (ref 136–145)

## 2018-10-31 PROCEDURE — 74011250636 HC RX REV CODE- 250/636: Performed by: STUDENT IN AN ORGANIZED HEALTH CARE EDUCATION/TRAINING PROGRAM

## 2018-10-31 PROCEDURE — 82784 ASSAY IGA/IGD/IGG/IGM EACH: CPT | Performed by: INTERNAL MEDICINE

## 2018-10-31 PROCEDURE — 74011000258 HC RX REV CODE- 258: Performed by: HOSPITALIST

## 2018-10-31 PROCEDURE — 77030021783 HC SYS CEM DEL MEDT -D

## 2018-10-31 PROCEDURE — A9585 GADOBUTROL INJECTION: HCPCS | Performed by: STUDENT IN AN ORGANIZED HEALTH CARE EDUCATION/TRAINING PROGRAM

## 2018-10-31 PROCEDURE — 99153 MOD SED SAME PHYS/QHP EA: CPT

## 2018-10-31 PROCEDURE — 74011250637 HC RX REV CODE- 250/637: Performed by: HOSPITALIST

## 2018-10-31 PROCEDURE — 77030011218 HC DEV BIOP BN KYPH -C

## 2018-10-31 PROCEDURE — 74011250637 HC RX REV CODE- 250/637: Performed by: INTERNAL MEDICINE

## 2018-10-31 PROCEDURE — 88307 TISSUE EXAM BY PATHOLOGIST: CPT | Performed by: HOSPITALIST

## 2018-10-31 PROCEDURE — 88311 DECALCIFY TISSUE: CPT | Performed by: HOSPITALIST

## 2018-10-31 PROCEDURE — 74011000250 HC RX REV CODE- 250: Performed by: STUDENT IN AN ORGANIZED HEALTH CARE EDUCATION/TRAINING PROGRAM

## 2018-10-31 PROCEDURE — 77030034842 HC TAMP SPN BN INFL EXP II KYPH -I

## 2018-10-31 PROCEDURE — 77030003666 HC NDL SPINAL BD -A

## 2018-10-31 PROCEDURE — 65270000029 HC RM PRIVATE

## 2018-10-31 PROCEDURE — 77030038269 HC DRN EXT URIN PURWCK BARD -A

## 2018-10-31 PROCEDURE — 36415 COLL VENOUS BLD VENIPUNCTURE: CPT | Performed by: INTERNAL MEDICINE

## 2018-10-31 PROCEDURE — 0QU03JZ SUPPLEMENT LUMBAR VERTEBRA WITH SYNTHETIC SUBSTITUTE, PERCUTANEOUS APPROACH: ICD-10-PCS | Performed by: STUDENT IN AN ORGANIZED HEALTH CARE EDUCATION/TRAINING PROGRAM

## 2018-10-31 PROCEDURE — 80048 BASIC METABOLIC PNL TOTAL CA: CPT | Performed by: INTERNAL MEDICINE

## 2018-10-31 PROCEDURE — 94760 N-INVAS EAR/PLS OXIMETRY 1: CPT

## 2018-10-31 PROCEDURE — 0QS03ZZ REPOSITION LUMBAR VERTEBRA, PERCUTANEOUS APPROACH: ICD-10-PCS | Performed by: STUDENT IN AN ORGANIZED HEALTH CARE EDUCATION/TRAINING PROGRAM

## 2018-10-31 PROCEDURE — 0QB03ZX EXCISION OF LUMBAR VERTEBRA, PERCUTANEOUS APPROACH, DIAGNOSTIC: ICD-10-PCS | Performed by: STUDENT IN AN ORGANIZED HEALTH CARE EDUCATION/TRAINING PROGRAM

## 2018-10-31 PROCEDURE — 74011250636 HC RX REV CODE- 250/636: Performed by: HOSPITALIST

## 2018-10-31 PROCEDURE — 77030021782 HC SYS CEM CART DEL KYPH -C

## 2018-10-31 PROCEDURE — C1713 ANCHOR/SCREW BN/BN,TIS/BN: HCPCS

## 2018-10-31 RX ORDER — CLINDAMYCIN PHOSPHATE 600 MG/50ML
600 INJECTION INTRAVENOUS ONCE
Status: DISCONTINUED | OUTPATIENT
Start: 2018-10-31 | End: 2018-10-31

## 2018-10-31 RX ORDER — BUPIVACAINE HYDROCHLORIDE 5 MG/ML
10 INJECTION, SOLUTION EPIDURAL; INTRACAUDAL ONCE
Status: COMPLETED | OUTPATIENT
Start: 2018-10-31 | End: 2018-10-31

## 2018-10-31 RX ORDER — MIDAZOLAM HYDROCHLORIDE 1 MG/ML
5 INJECTION, SOLUTION INTRAMUSCULAR; INTRAVENOUS
Status: DISCONTINUED | OUTPATIENT
Start: 2018-10-31 | End: 2018-10-31

## 2018-10-31 RX ORDER — LIDOCAINE HYDROCHLORIDE 20 MG/ML
20 INJECTION, SOLUTION INFILTRATION; PERINEURAL ONCE
Status: COMPLETED | OUTPATIENT
Start: 2018-10-31 | End: 2018-10-31

## 2018-10-31 RX ORDER — FENTANYL CITRATE 50 UG/ML
100 INJECTION, SOLUTION INTRAMUSCULAR; INTRAVENOUS
Status: DISCONTINUED | OUTPATIENT
Start: 2018-10-31 | End: 2018-10-31

## 2018-10-31 RX ORDER — CLINDAMYCIN PHOSPHATE 600 MG/50ML
600 INJECTION, SOLUTION INTRAVENOUS ONCE
Status: COMPLETED | OUTPATIENT
Start: 2018-10-31 | End: 2018-10-31

## 2018-10-31 RX ORDER — SODIUM CHLORIDE 9 MG/ML
25 INJECTION, SOLUTION INTRAVENOUS CONTINUOUS
Status: DISCONTINUED | OUTPATIENT
Start: 2018-10-31 | End: 2018-10-31

## 2018-10-31 RX ADMIN — GADOBUTROL 10 ML: 604.72 INJECTION INTRAVENOUS at 14:07

## 2018-10-31 RX ADMIN — ACETAMINOPHEN 650 MG: 325 TABLET ORAL at 06:20

## 2018-10-31 RX ADMIN — SODIUM CHLORIDE 25 ML/HR: 900 INJECTION, SOLUTION INTRAVENOUS at 13:02

## 2018-10-31 RX ADMIN — FENTANYL CITRATE 25 MCG: 50 INJECTION, SOLUTION INTRAMUSCULAR; INTRAVENOUS at 13:38

## 2018-10-31 RX ADMIN — AZTREONAM 1 G: 1 INJECTION, POWDER, LYOPHILIZED, FOR SOLUTION INTRAMUSCULAR; INTRAVENOUS at 20:35

## 2018-10-31 RX ADMIN — METOPROLOL TARTRATE 25 MG: 25 TABLET ORAL at 17:26

## 2018-10-31 RX ADMIN — MIDAZOLAM HYDROCHLORIDE 0.5 MG: 1 INJECTION, SOLUTION INTRAMUSCULAR; INTRAVENOUS at 13:43

## 2018-10-31 RX ADMIN — TRAZODONE HYDROCHLORIDE 50 MG: 50 TABLET ORAL at 22:09

## 2018-10-31 RX ADMIN — Medication 10 ML: at 22:09

## 2018-10-31 RX ADMIN — AMLODIPINE BESYLATE 5 MG: 5 TABLET ORAL at 17:26

## 2018-10-31 RX ADMIN — Medication 10 ML: at 06:20

## 2018-10-31 RX ADMIN — LIDOCAINE HYDROCHLORIDE 200 MG: 20 INJECTION, SOLUTION INFILTRATION; PERINEURAL at 14:07

## 2018-10-31 RX ADMIN — AZTREONAM 1 G: 1 INJECTION, POWDER, LYOPHILIZED, FOR SOLUTION INTRAMUSCULAR; INTRAVENOUS at 04:42

## 2018-10-31 RX ADMIN — MIDAZOLAM HYDROCHLORIDE 1 MG: 1 INJECTION, SOLUTION INTRAMUSCULAR; INTRAVENOUS at 13:34

## 2018-10-31 RX ADMIN — MIDAZOLAM HYDROCHLORIDE 1 MG: 1 INJECTION, SOLUTION INTRAMUSCULAR; INTRAVENOUS at 13:27

## 2018-10-31 RX ADMIN — ACETAMINOPHEN 650 MG: 325 TABLET ORAL at 00:00

## 2018-10-31 RX ADMIN — ACETAMINOPHEN 650 MG: 325 TABLET ORAL at 17:25

## 2018-10-31 RX ADMIN — HYDROXYZINE HYDROCHLORIDE 50 MG: 25 TABLET, FILM COATED ORAL at 22:09

## 2018-10-31 RX ADMIN — MIDAZOLAM HYDROCHLORIDE 0.5 MG: 1 INJECTION, SOLUTION INTRAMUSCULAR; INTRAVENOUS at 13:40

## 2018-10-31 RX ADMIN — CLINDAMYCIN PHOSPHATE 600 MG: 600 INJECTION, SOLUTION INTRAVENOUS at 13:02

## 2018-10-31 RX ADMIN — FENTANYL CITRATE 50 MCG: 50 INJECTION, SOLUTION INTRAMUSCULAR; INTRAVENOUS at 13:27

## 2018-10-31 RX ADMIN — BUPIVACAINE HYDROCHLORIDE 50 MG: 5 INJECTION, SOLUTION EPIDURAL; INTRACAUDAL; PERINEURAL at 14:07

## 2018-10-31 RX ADMIN — DOCUSATE SODIUM 100 MG: 100 CAPSULE, LIQUID FILLED ORAL at 17:26

## 2018-10-31 RX ADMIN — SODIUM CHLORIDE 75 ML/HR: 900 INJECTION, SOLUTION INTRAVENOUS at 22:39

## 2018-10-31 RX ADMIN — FENTANYL CITRATE 25 MCG: 50 INJECTION, SOLUTION INTRAMUSCULAR; INTRAVENOUS at 13:53

## 2018-10-31 NOTE — PROGRESS NOTES
Dr. Chilo Lee in to speak with pt. About procedure - informed Dr. Chilo Lee her son gave phone consent r/t pt.'s confusion.

## 2018-10-31 NOTE — PROGRESS NOTES
Occupational Therapy Chart reviewed. Pt is currently off the floor for testing. Will continue to follow. Will need to clarify activity parameters as orthopedics is not consulted for acute compression fx.  Mae Allen, OT

## 2018-10-31 NOTE — PROGRESS NOTES
Corwin Hamilton, transport, is here to transfer pt. To her inpt. Room via stretcher. Pt still without c/o pain. Pt still noted with HOB elevated approx 30 degrees. Pt states she is hungry and informed the pt. That she can have her regular diet this p.m. Per her doctors orders.

## 2018-10-31 NOTE — CONSULTS
[facility]  MR#: [id number]  : 1927  ACCOUNT #: [de-identified]   DATE OF SERVICE: 10/31/2018    REASON FOR CONSULTATION:  MRI showing possible bony lesions concerning for possible multiple myeloma or carcinoma. REFERRING PHYSICIAN:  Gerber Dawkins MD    HISTORY OF PRESENT ILLNESS:  The patient is a 80-year-old white female who we are asked to see. She has had some compression fractures. MRI of her thoracic spine shows diffusely heterogenous bone marrow pattern with multiple small areas of indistinct increased signal likely related to more focal lesions that is worrisome for multiple myeloma or metastasis, a more focal expansive lesion in the T10 pedicle that causes moderate left neural foramen narrowing, chronic mild compression deformity of T12. MRI of the lumbar spine showed heterogenous bone pattern as well, chronic moderate compression of T12, chronic moderate compression fracture of L3, acute moderate compression fracture of L4, may be pathologic, mild compression of L5 that may be acute and pathologic as well. The patient is getting ready to undergo kyphoplasty. She is nervous about that, but otherwise really has no complaints. PAST MEDICAL HISTORY:  Significant for hypertension, thyroid disease, angina. PAST SURGICAL HISTORY:  Basal cell cancer removed from her back. SOCIAL HISTORY:  She never smoked. She never drank. FAMILY HISTORY:  No history of any cancers. ALLERGIES:  CELEBREX, CIPROFLOXACIN, IV DYE, MACROBID, NIASPAN, AND PENICILLIN. CURRENT MEDICATIONS:  She is on Tylenol, amlodipine, aspirin, atorvastatin, aztreonam, clindamycin, metoprolol, trazodone. REVIEW OF SYSTEMS:  12 systems done and negative except for as above. PHYSICAL EXAMINATION:  VITAL SIGNS:  Temperature 98.9, pulse 67, blood pressure 141/50, respirations 16, satting 93% on room air. GENERAL:  Lying in bed in no acute distress.   HEENT: Normocephalic, atraumatic. Oropharynx clear, without lesion. NECK:  Supple. No cervical, supraclavicular, or axillary lymphadenopathy appreciated. CARDIOVASCULAR:  Regular rate and rhythm. LUNGS:  Clear to auscultation bilaterally. ABDOMEN:  Normoactive bowel sounds, soft, nontender, nondistended. No hepatosplenomegaly. EXTREMITIES:  No clubbing, cyanosis or edema. NEUROLOGIC:  Nonfocal.    LABORATORY DATA:  White blood cell count 9.6, hemoglobin 14.4, platelets 558. Chemistry:  Sodium 136, potassium 3.5, BUN 12, creatinine 0.49. LFTs done on admission. Total bilirubin 0.6, ALT 23, AST 15, alkaline phosphatase 97. CT scan of her abdomen and pelvis done 10/29/2018 shows chronic appearing lower lobe thoracic and lumbar compression deformities. No sign of any tumor. ASSESSMENT AND PLAN:  The patient is a 54-year-old white female who we are asked to see for possible multiple myeloma versus metastasis. I talked with her today. She is going to be having kyphoplasty done. They will do a biopsy. I will send off labs for monoclonal gammopathy. We will see what all that shows. Her hemoglobin, creatinine and calcium levels, all look good which would go against this being myeloma, but we will see what the biopsy shows, see what the labs show and make further recommendations as that returns.       MD MICHAEL Walter / CODY  D: 10/31/2018 12:18     T: 10/31/2018 12:33  JOB #: [id number]

## 2018-10-31 NOTE — PROGRESS NOTES
Hospitalist Progress Note NAME: Mega Ann :  1927 MRN:  [id number] Assessment / Plan: 
Osteoporotic Compression fracture causing inability to ambulate Intractable pain  
-Xray: Multilevel fractures with progressive collapse of L4. Osteopenia and a very Punctate bilateral nonobstructing renal stones. No hydronephrosis 
-CT: Multiple chronic appearing lower thoracic and lumbar compression deformities 
-pain management: tylenol scheduled + oxy prn  
-MRI T/L spine showed Diffusely heterogeneous bone marrow pattern with multiple small areas of indistinct increased STIR signal likely related to more focal lesions. This 
pattern is worrisome for multiple myeloma or metastases. A more focal expansile 
lesion in the left T10 pedicle that causes moderate left neural foraminal narrowing at T10-11. Chronic mild compression deformity of T12. 
-for kyphoplasty today 
-oncology consulted for further evaluation due to findings on MRI  
-PT/OT  
  
Uncomplicated UTI  
-Ucx growing GNR, Bcx NTD 
-allergic to rocephin (rash) and now aztreonam 
-s/p fosfomycin  x1 Rash, improving 
-due to allergies to rocephin 
-atarax/benadryl prn Hyponatremia  
-resolved with IVF 
-holding HCTZ 
  
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 
-evaluated by vascular surg, no further vascular evaluation/procedure. Follow up outpt as needed. 
  
HTN/ CAD ( h/o MI with stents ) -BP stable 
-hold HCTZ due to hyponatremia  
-cont norvasc, metoprolol 
  
Fibromyalgia, per pt takes valium prn; trazodone for sleep HLP, cont zocor  
  
  
 
Code Status: Full code d/w pt and son at bedside by my partner Surrogate Decision Maker: son   
DVT Prophylaxis:  SCDs. for possible kyphoplasty GI Prophylaxis: not indicated  
Baseline: lives alone; ambulating with cane; has 1 son Subjective: Chief Complaint / Reason for Physician Visit Pt seen at bedside. She has no complaints.   I discussed with her the MRI finding results and plans of oncology consultation. Pt has no question/concerns. Discussed with RN events overnight. Review of Systems: 
Symptom Y/N Comments  Symptom Y/N Comments Fever/Chills n   Chest Pain n   
Poor Appetite    Edema Cough    Abdominal Pain Sputum    Joint Pain SOB/REBOLLAR n   Pruritis/Rash y   
Nausea/vomit n   Tolerating PT/OT Diarrhea    Tolerating Diet Constipation    Other Could NOT obtain due to:   
 
Objective: VITALS:  
Last 24hrs VS reviewed since prior progress note. Most recent are: 
Patient Vitals for the past 24 hrs: 
 Temp Pulse Resp BP SpO2  
10/31/18 1214 98.9 °F (37.2 °C) 67  141/50 93 % 10/31/18 1039 98 °F (36.7 °C) 72  125/57 96 % 10/31/18 0738 98.9 °F (37.2 °C) 64  151/76 94 % 10/31/18 0400 97.5 °F (36.4 °C) 68 16 149/56 95 % 10/30/18 2102 97.6 °F (36.4 °C) 61 18 139/49 91 % 10/30/18 9040 Mandeep Gatica Intake/Output Summary (Last 24 hours) at 10/31/2018 1227 Last data filed at 10/30/2018 2125 Gross per 24 hour Intake  Output 1250 ml Net -1250 ml PHYSICAL EXAM: 
General: WD, WN. Alert, cooperative, no acute distress   
EENT:  EOMI. Anicteric sclerae. MMM Resp:  CTA bilaterally, no wheezing or rales. No accessory muscle use CV:  Regular  rhythm,  No edema GI:  Rash on abd area. Soft, Non distended, Non tender.  +BS Neurologic:  Alert and oriented X 3, normal speech, Psych:   Not anxious nor agitated Skin:  No rashes. No jaundice Reviewed most current lab test results and cultures  YES Reviewed most current radiology test results   YES Review and summation of old records today    NO Reviewed patient's current orders and MAR    YES 
PMH/SH reviewed - no change compared to H&P 
________________________________________________________________________ Care Plan discussed with: 
  Comments Patient x Family RN x Care Manager Consultant Multidiciplinary team rounds were held today with , nursing, pharmacist and clinical coordinator. Patient's plan of care was discussed; medications were reviewed and discharge planning was addressed. ________________________________________________________________________ Total NON critical care TIME:  35   Minutes Total CRITICAL CARE TIME Spent:   Minutes non procedure based Comments >50% of visit spent in counseling and coordination of care    
________________________________________________________________________ Carlos Gatica MD  
 
Procedures: see electronic medical records for all procedures/Xrays and details which were not copied into this note but were reviewed prior to creation of Plan. LABS: 
I reviewed today's most current labs and imaging studies. Pertinent labs include: 
Recent Labs 10/30/18 
0444 10/29/18 
1600 WBC 9.6 10.5 HGB 14.4 15.5 HCT 43.4 44.9  255 Recent Labs 10/31/18 
0441 10/30/18 
0444 10/29/18 
1600  133* 131*  
K 3.5 3.6 3.5  103 100 CO2 22 22 22 * 117* 113* BUN 12 8 10 CREA 0.49* 0.43* 0.48* CA 7.7* 8.4* 8.7 MG  --  1.9  --   
PHOS  --  3.2  --   
ALB  --   --  3.7 TBILI  --   --  0.6 SGOT  --   --  15 ALT  --   --  23 Signed: Carlos Gatica MD

## 2018-10-31 NOTE — PROGRESS NOTES
Received pt. On stretcher in xray recovery - pt oriented x 1 to person \"I am so mixed up\" per pt. Pt states her back only hurts with  Movement. Pt awake.

## 2018-10-31 NOTE — PROGRESS NOTES
Reassured pt. Procedure was completed - pt voicing \" I can't believe it is over. I have no pain at all\".

## 2018-10-31 NOTE — PROGRESS NOTES
Called pt.'s son, Genevieve Mario at [phone], to inform him pt's procedure was completed per his request when obtaining consent.

## 2018-10-31 NOTE — PROGRESS NOTES
Called pt.'s son, Fredi Quintero at [phone], to obtain consent for procedure r/t pt.'s altered mental status. Pt's son states it was explained to him last p.m. And he understands and agrees to procedure for his mother. Parag Figueroa, pt.'s nurse, to inquire about pt. Being NPO r/t pt. Stating \"I did eat this morning\"  - Grace Reich states pt. Has been NPO and did not eat.

## 2018-10-31 NOTE — PROGRESS NOTES
Informed pt.'s inpt. Nurse, Caryn Humphries, that there is 1 dried blood spot on each bandaid from kyphoplasty that occurred immediately post procedure - no active bleeding noted from sites.

## 2018-10-31 NOTE — PROGRESS NOTES
Chart reviewed. Patient to undergo kyphoplasty later today. PT will follow up for eval tomorrow.  
 
Kun Leger, PT

## 2018-10-31 NOTE — ROUTINE PROCESS
TRANSFER - OUT REPORT: 
 
Verbal report given to nurse Hans for pt., (name) on Immanuel Ann  being transferred to ortho, ext [phone](unit) for routine progression of care Report consisted of patients Situation, Background, Assessment and  
Recommendations(SBAR). Information from the following report(s) Procedure Summary was reviewed with the receiving nurse. Lines:  
Peripheral IV 10/29/18 Right Forearm (Active) Site Assessment Clean, dry, & intact 10/31/2018  8:30 AM  
Phlebitis Assessment 0 10/31/2018  8:30 AM  
Infiltration Assessment 0 10/31/2018  8:30 AM  
Dressing Status Clean, dry, & intact 10/31/2018  8:30 AM  
Dressing Type Tape;Transparent 10/31/2018  8:30 AM  
Hub Color/Line Status Pink; Infusing 10/30/2018  9:25 PM  
  
 
Opportunity for questions and clarification was provided. Name of procedure:  Kyphoplasty Lumbar four(4) with conscious sedation Complications, if any, r/t procedure:  None Sedation medications given: 
 
Versed 3 mg Fentanyl 100 mcg Sedation tolerated: Without difficulty VS : Stable Post Procedure Care Needed/order sets in connectcare: 
   
 
See [facility] Any other specific needs to pt. Care:  Please call ext [phone] for any further questions/concerns regarding pt.'s procedure today.

## 2018-11-01 LAB
ANION GAP SERPL CALC-SCNC: 6 MMOL/L (ref 5–15)
APPEARANCE UR: CLEAR
BACTERIA URNS QL MICRO: NEGATIVE /HPF
BILIRUB UR QL: NEGATIVE
BUN SERPL-MCNC: 11 MG/DL (ref 6–20)
BUN/CREAT SERPL: 23 (ref 12–20)
CALCIUM SERPL-MCNC: 7.9 MG/DL (ref 8.5–10.1)
CHLORIDE SERPL-SCNC: 108 MMOL/L (ref 97–108)
CO2 SERPL-SCNC: 23 MMOL/L (ref 21–32)
COLOR UR: ABNORMAL
CREAT SERPL-MCNC: 0.48 MG/DL (ref 0.55–1.02)
EPITH CASTS URNS QL MICRO: ABNORMAL /LPF
GLUCOSE SERPL-MCNC: 88 MG/DL (ref 65–100)
GLUCOSE UR STRIP.AUTO-MCNC: NEGATIVE MG/DL
HGB UR QL STRIP: NEGATIVE
HYALINE CASTS URNS QL MICRO: ABNORMAL /LPF (ref 0–5)
KETONES UR QL STRIP.AUTO: NEGATIVE MG/DL
LEUKOCYTE ESTERASE UR QL STRIP.AUTO: ABNORMAL
NITRITE UR QL STRIP.AUTO: NEGATIVE
PH UR STRIP: 6 [PH] (ref 5–8)
POTASSIUM SERPL-SCNC: 3.9 MMOL/L (ref 3.5–5.1)
PROT UR STRIP-MCNC: NEGATIVE MG/DL
RBC #/AREA URNS HPF: ABNORMAL /HPF (ref 0–5)
SODIUM SERPL-SCNC: 137 MMOL/L (ref 136–145)
SP GR UR REFRACTOMETRY: 1.01 (ref 1–1.03)
UA: UC IF INDICATED,UAUC: ABNORMAL
UROBILINOGEN UR QL STRIP.AUTO: 0.2 EU/DL (ref 0.2–1)
WBC URNS QL MICRO: ABNORMAL /HPF (ref 0–4)

## 2018-11-01 PROCEDURE — G8979 MOBILITY GOAL STATUS: HCPCS

## 2018-11-01 PROCEDURE — 36415 COLL VENOUS BLD VENIPUNCTURE: CPT | Performed by: INTERNAL MEDICINE

## 2018-11-01 PROCEDURE — 97116 GAIT TRAINING THERAPY: CPT

## 2018-11-01 PROCEDURE — 74011250636 HC RX REV CODE- 250/636: Performed by: INTERNAL MEDICINE

## 2018-11-01 PROCEDURE — 97162 PT EVAL MOD COMPLEX 30 MIN: CPT

## 2018-11-01 PROCEDURE — 81001 URINALYSIS AUTO W/SCOPE: CPT | Performed by: INTERNAL MEDICINE

## 2018-11-01 PROCEDURE — 80048 BASIC METABOLIC PNL TOTAL CA: CPT | Performed by: INTERNAL MEDICINE

## 2018-11-01 PROCEDURE — G8978 MOBILITY CURRENT STATUS: HCPCS

## 2018-11-01 PROCEDURE — G8987 SELF CARE CURRENT STATUS: HCPCS

## 2018-11-01 PROCEDURE — 97165 OT EVAL LOW COMPLEX 30 MIN: CPT

## 2018-11-01 PROCEDURE — 84155 ASSAY OF PROTEIN SERUM: CPT | Performed by: INTERNAL MEDICINE

## 2018-11-01 PROCEDURE — 74011250637 HC RX REV CODE- 250/637: Performed by: HOSPITALIST

## 2018-11-01 PROCEDURE — 74011250637 HC RX REV CODE- 250/637: Performed by: INTERNAL MEDICINE

## 2018-11-01 PROCEDURE — 97535 SELF CARE MNGMENT TRAINING: CPT

## 2018-11-01 PROCEDURE — 97530 THERAPEUTIC ACTIVITIES: CPT

## 2018-11-01 PROCEDURE — 65270000029 HC RM PRIVATE

## 2018-11-01 PROCEDURE — G8988 SELF CARE GOAL STATUS: HCPCS

## 2018-11-01 PROCEDURE — 83883 ASSAY NEPHELOMETRY NOT SPEC: CPT | Performed by: INTERNAL MEDICINE

## 2018-11-01 PROCEDURE — 77030038269 HC DRN EXT URIN PURWCK BARD -A

## 2018-11-01 RX ORDER — ENOXAPARIN SODIUM 100 MG/ML
30 INJECTION SUBCUTANEOUS EVERY 24 HOURS
Status: DISCONTINUED | OUTPATIENT
Start: 2018-11-01 | End: 2018-11-02 | Stop reason: HOSPADM

## 2018-11-01 RX ADMIN — DOCUSATE SODIUM 100 MG: 100 CAPSULE, LIQUID FILLED ORAL at 08:40

## 2018-11-01 RX ADMIN — HYDROXYZINE HYDROCHLORIDE 50 MG: 25 TABLET, FILM COATED ORAL at 19:47

## 2018-11-01 RX ADMIN — OXYCODONE HYDROCHLORIDE 5 MG: 5 TABLET ORAL at 19:47

## 2018-11-01 RX ADMIN — ACETAMINOPHEN 650 MG: 325 TABLET ORAL at 14:28

## 2018-11-01 RX ADMIN — AMLODIPINE BESYLATE 5 MG: 5 TABLET ORAL at 08:39

## 2018-11-01 RX ADMIN — DOCUSATE SODIUM 100 MG: 100 CAPSULE, LIQUID FILLED ORAL at 17:45

## 2018-11-01 RX ADMIN — ATORVASTATIN CALCIUM 10 MG: 10 TABLET, FILM COATED ORAL at 08:40

## 2018-11-01 RX ADMIN — METOPROLOL TARTRATE 25 MG: 25 TABLET ORAL at 17:45

## 2018-11-01 RX ADMIN — ACETAMINOPHEN 650 MG: 325 TABLET ORAL at 06:33

## 2018-11-01 RX ADMIN — ACETAMINOPHEN 650 MG: 325 TABLET ORAL at 17:45

## 2018-11-01 RX ADMIN — Medication 10 ML: at 06:32

## 2018-11-01 RX ADMIN — Medication 10 ML: at 14:29

## 2018-11-01 RX ADMIN — Medication 10 ML: at 21:21

## 2018-11-01 RX ADMIN — ASPIRIN 81 MG 81 MG: 81 TABLET ORAL at 08:39

## 2018-11-01 RX ADMIN — ENOXAPARIN SODIUM 30 MG: 30 INJECTION, SOLUTION INTRAVENOUS; SUBCUTANEOUS at 14:29

## 2018-11-01 RX ADMIN — ACETAMINOPHEN 650 MG: 325 TABLET ORAL at 23:45

## 2018-11-01 RX ADMIN — ACETAMINOPHEN 650 MG: 325 TABLET ORAL at 00:37

## 2018-11-01 RX ADMIN — METOPROLOL TARTRATE 50 MG: 50 TABLET ORAL at 08:40

## 2018-11-01 NOTE — PROGRESS NOTES
Problem: Mobility Impaired (Adult and Pediatric) Goal: *Acute Goals and Plan of Care (Insert Text) Physical Therapy Goals Initiated 11/1/2018 1. Patient will move from supine to sit and sit to supine , scoot up and down and roll side to side in bed with independence within 4 days. 2. Patient will perform sit to stand with modified independence within 4 days. 3. Patient will ambulate with modified independence for 700 feet with the least restrictive device within 4 days. 4. Patient will verbalize and demonstrate understanding of spinal precautions (No bending, lifting greater than 5 lbs, or twisting; log-roll technique; frequent repositioning as instructed) within 4 days. physical Therapy EVALUATION Patient: Mykel Ann (80 y.o. female) Date: 11/1/2018 Primary Diagnosis: Compression fracture of L4 lumbar vertebra (HCC) Precautions:  Fall, Spinal, Other (comment)(s/p L4 kyphoplasty 10/31/18) ASSESSMENT : 
Based on the objective data described below, the patient presents with generalized weakness, decreased balance, mobility skills and minimal back pain following kyphoplasty yesterday. She was lying in bed when PT arrived. Agreed to therapy and cleared by nursing for mobility. PTA she reports living alone in a 1 story home with a ramp. Her son and other family members assist and check on her during the week for ADLs. She admits recurrent falls in kitchen with and without walker. She uses a walker almost all the time and on occasion a wheelchair. She showers with the assistance of a female family member 2 x week and sponge bathes the other days. Currently needs min a x 1 for log rolling supine to sit. Sit to stand and bed to chair transfers are cg assistance. Gait was well tolerated for 145 feet using RW and cg assistance. Mild path deviations only observed, but did have a slight lob backwards getting her underpants up after voiding on the toilet. She was left up in a bedside chair at the end of the session. Fall precautions reinforced prior to leaving patient - she verbalized understanding. She will benefit from continued PT to improve strength, balance and mobility skills. Recommend home with 24/7 supervision/assistance with HH PT vs SNF upon discharge. No DME needs at this time. Patient will benefit from skilled intervention to address the above impairments. Patients rehabilitation potential is considered to be Good Factors which may influence rehabilitation potential include:  
[]         None noted 
[]         Mental ability/status []         Medical condition 
[x]         Home/family situation and support systems 
[]         Safety awareness 
[]         Pain tolerance/management 
[]         Other: PLAN : 
Recommendations and Planned Interventions: 
[x]           Bed Mobility Training             [x]    Neuromuscular Re-Education 
[x]           Transfer Training                   []    Orthotic/Prosthetic Training 
[x]           Gait Training                         []    Modalities [x]           Therapeutic Exercises           []    Edema Management/Control 
[x]           Therapeutic Activities            [x]    Patient and Family Training/Education 
[]           Other (comment): Frequency/Duration: Patient will be followed by physical therapy  5 times a week to address goals. Discharge Recommendations: Home Health PT Further Equipment Recommendations for Discharge: None SUBJECTIVE:  
Patient stated I'd like to go home when I leave the hospital. OBJECTIVE DATA SUMMARY:  
HISTORY:   
Past Medical History:  
Diagnosis Date  Angina   
 has had mi  Arrhythmia  Cancer ([facility] [address]   
 basal cell removed form back  Chronic pain   
 fibromyalgia  Hypertension  Insomnia  Thyroid disease Past Surgical History:  
Procedure Laterality Date  HX CHOLECYSTECTOMY [address] [facility]  HX OTHER SURGICAL    
 basal cell removed from back Prior Level of Function/Home Situation: she reports living alone in a 1 story home with a ramp. Her son and other family members assist and check on her during the week for ADLs. She admits recurrent falls in kitchen with and without walker. She uses a walker almost all the time and on occasion a wheelchair. She showers with the assistance of a female family member 2 x week and sponge bathes the other days. Personal factors and/or comorbidities impacting plan of care: lives alone Home Situation Home Environment: Private residence # Steps to Enter: 0 Wheelchair Ramp: Yes One/Two Story Residence: One story Living Alone: Yes Support Systems: Family member(s) Patient Expects to be Discharged to[de-identified] Private residence Current DME Used/Available at Home: Reggy Hem, [place], Wheelchair, [address] chair Tub or Shower Type: Tub/Shower combination EXAMINATION/PRESENTATION/DECISION MAKING: Critical Behavior: 
Neurologic State: Alert Orientation Level: Oriented X4 Cognition: Appropriate decision making, Appropriate for age attention/concentration, Appropriate safety awareness Safety/Judgement: Awareness of environment, Fall prevention Hearing: Auditory Auditory Impairment: NoneSkin:   
Edema:  
Range Of Motion: 
AROM: Generally decreased, functional 
  
  
  
  
  
  
  
Strength:   
Strength: Generally decreased, functional 
  
  
  
  
  
  
Tone & Sensation:  
  
  
  
  
  
Sensation: Intact Coordination: 
Coordination: Within functional limits Vision:  
Tracking: Able to track stimulus in all quadrants w/o difficulty Acuity: Within Defined Limits Corrective Lenses: Glasses Functional Mobility: 
Bed Mobility: 
Rolling: Contact guard assistance;Assist x1;Additional time Supine to Sit: Minimum assistance;Assist x1;Additional time Scooting: Contact guard assistance;Assist x1;Additional time Transfers: Sit to Stand: Contact guard assistance;Assist x1;Additional time; Adaptive equipment Stand to Sit: Contact guard assistance; Additional time; Adaptive equipment;Assist x1 Bed to Chair: Contact guard assistance;Assist x1;Additional time; Adaptive equipment Balance:  
Sitting: Intact Standing: Impaired; With support Standing - Static: Good Standing - Dynamic : FairAmbulation/Gait Training:Distance (ft): 145 Feet (ft) Assistive Device: Walker, rolling;Gait belt Ambulation - Level of Assistance: Contact guard assistance Gait Description (WDL): Exceptions to [facility] Gait Abnormalities: Path deviations Base of Support: Narrowed Speed/Mikala: Pace decreased (<100 feet/min) Functional Measure: 
Barthel Index: 
 
Bathin Bladder: 5 Bowels: 10 
Groomin Dressin Feeding: 10 Mobility: 10 Stairs: 5 Toilet Use: 5 Transfer (Bed to Chair and Back): 10 Total: 65 Barthel and G-code impairment scale: 
Percentage of impairment CH 
0% CI 
1-19% CJ 
20-39% CK 
40-59% CL 
60-79% CM 
80-99% CN 
100% Barthel Score 0-100 100 99-80 79-60 59-40 20-39 1-19 
 0 Barthel Score 0-20 20 17-19 13-16 9-12 5-8 1-4 0 The Barthel ADL Index: Guidelines 1. The index should be used as a record of what a patient does, not as a record of what a patient could do. 2. The main aim is to establish degree of independence from any help, physical or verbal, however minor and for whatever reason. 3. The need for supervision renders the patient not independent. 4. A patient's performance should be established using the best available evidence. Asking the patient, friends/relatives and nurses are the usual sources, but direct observation and common sense are also important. However direct testing is not needed. 5. Usually the patient's performance over the preceding 24-48 hours is important, but occasionally longer periods will be relevant. 6. Middle categories imply that the patient supplies over 50 per cent of the effort. 7. Use of aids to be independent is allowed. Maryse Chong., Barthel, D.W. (1491). Functional evaluation: the Barthel Index. [address] [facility] (14)2. GARRETT Avendaño, Lilliana Puga., Susu Guzman., [place], [address] [facility] (1999). Measuring the change indisability after inpatient rehabilitation; comparison of the responsiveness of the Barthel Index and Functional [place] Measure. Journal of Neurology, Neurosurgery, and Psychiatry, 66(4), 550-627. JADON Niño, NOMI [place], & Jonny Celestin M.A. (2004.) Assessment of post-stroke quality of life in cost-effectiveness studies: The usefulness of the Barthel Index and the EuroQoL-5D. [facility], 13, 154-79 G codes: In compliance with CMSs Claims Based Outcome Reporting, the following G-code set was chosen for this patient based on their primary functional limitation being treated: The outcome measure chosen to determine the severity of the functional limitation was the Barthel with a score of 65/100 which was correlated with the impairment scale. ? Mobility - Walking and Moving Around:  
  - CURRENT STATUS: CJ - 20%-39% impaired, limited or restricted  - GOAL STATUS: CI - 1%-19% impaired, limited or restricted  - D/C STATUS:  ---------------To be determined--------------- Physical Therapy Evaluation Charge Determination History Examination Presentation Decision-Making HIGH Complexity :3+ comorbidities / personal factors will impact the outcome/ POC  MEDIUM Complexity : 3 Standardized tests and measures addressing body structure, function, activity limitation and / or participation in recreation  MEDIUM Complexity : Evolving with changing characteristics  Other outcome measures Barthel  MEDIUM Based on the above components, the patient evaluation is determined to be of the following complexity level: MEDIUM 
 
 Pain: 
Pain Scale 1: Numeric (0 - 10) Pain Intensity 1: 0 Activity Tolerance:  
Good Please refer to the flowsheet for vital signs taken during this treatment. After treatment:  
[x]         Patient left in no apparent distress sitting up in chair 
[]         Patient left in no apparent distress in bed 
[x]         Call bell left within reach [x]         Nursing notified 
[]         Caregiver present [x]         Bed alarm activated COMMUNICATION/EDUCATION:  
The patients plan of care was discussed with: Occupational Therapist, Registered Nurse,  and [place]. [x]         Fall prevention education was provided and the patient/caregiver indicated understanding. [x]         Patient/family have participated as able in goal setting and plan of care. [x]         Patient/family agree to work toward stated goals and plan of care. []         Patient understands intent and goals of therapy, but is neutral about his/her participation. []         Patient is unable to participate in goal setting and plan of care. Thank you for this referral. 
Milton Finn, PT Time Calculation: 34 mins

## 2018-11-01 NOTE — PROGRESS NOTES
Pharmacy  Enoxaparin (Lovenox®) Monitoring Indication: PE PPx Current Dose: Enoxaparin 40 subcutaneously every 24 hours Creatinine Clearance (mL/min): 40.8 ml/min Current Weight: 56.6 kg Labs: 
Recent Labs 11/01/18 
0549 10/31/18 
0441 10/30/18 
0444 10/29/18 
1600 CREA 0.48* 0.49* 0.43* 0.48* HGB  --   --  14.4 15.5 PLT  --   --  227 255 Wt Readings from Last 1 Encounters:  
10/29/18 56.6 kg (124 lb 12.5 oz) Ht Readings from Last 1 Encounters:  
10/29/18 149.9 cm (59\") Impression/Plan: CrCl 40,.8 ml/hr weight 56.6 will change dose to 30 mg/ sc q24h secondary to low weight Thanks, 
Jovany Castellano [facility] 
 
  http://[facility]/[place]/[facility]/Pharmacy/Clinical%20Companion/Lovenox%20Dose%20Adjustment%20protocol. pdf

## 2018-11-01 NOTE — PROGRESS NOTES
Orders received, chart reviewed and patient evaluated by physical therapy. Recommend patient to discharge to Home with PT/OT and 24/7 assistance/supervision pending progress with skilled acute care physical therapy. Recommend with nursing patient to complete as able in order to maintain strength, endurance and independence: OOB to chair 3x/day with cg assistance and ambulating with RW. Thank you for your assistance. Full evaluation to follow.   
 
Bienvenido Steinberg, PT

## 2018-11-01 NOTE — PROGRESS NOTES
Hospitalist Progress Note NAME: Nancy Ann :  1927 MRN:  [id number] Assessment / Plan: 
Osteoporotic compression fracture causing inability to ambulate Intractable pain Possible myeloma  
-Xray: Multilevel fractures w 
ith progressive collapse of L4. Osteopenia and a very Punctate bilateral nonobstructing renal stones. No hydronephrosis 
-CT: Multiple chronic appearing lower thoracic and lumbar compression deformities 
-pain management: tylenol scheduled + oxy prn  
-MRI T/L spine showed Diffusely heterogeneous bone marrow pattern with multiple small areas of indistinct increased STIR signal likely related to more focal lesions. This 
pattern is worrisome for multiple myeloma or metastases. A more focal expansile 
lesion in the left T10 pedicle that causes moderate left neural foraminal narrowing at T10-11. Chronic mild compression deformity of T12. 
-s/p kyphoplasty 10/31. Not in pain. Path pending 
-oncology consulted, MM labs pending. Needs outpt f/u with oncology 
-PT/OT, looks like H with  PT/OT and 24 hr supervision 
  
Uncomplicated UTI  
-Ucx growing GNR, Bcx NTD 
-allergic to rocephin (rash) and now aztreonam 
-s/p fosfomycin  x1 Rash, improved 
-due to allergies to rocephin 
-atarax/benadryl prn Hyponatremia  
-resolved with IVF 
-holding HCTZ 
  
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 
-evaluated by vascular surg, no further vascular evaluation/procedure. Follow up outpt as needed. 
  
HTN/ CAD ( h/o MI with stents ) -BP stable 
-hold HCTZ due to hyponatremia  
-cont norvasc, metoprolol 
  
Fibromyalgia, per pt takes valium prn; trazodone for sleep HLP, cont zocor  
  
  
 
Code Status: Full code d/w pt and son at bedside by my partner Surrogate Decision Maker: son   
DVT Prophylaxis:  lovenox GI Prophylaxis: not indicated  
Baseline: lives alone; ambulating with cane; has 1 son Subjective: Chief Complaint / Reason for Physician Visit Pt seen at bedside. No complaints. Not in pain. She was accompanied by her friend in room. Questions/concerns addressed. Discussed with RN events overnight. Review of Systems: 
Symptom Y/N Comments  Symptom Y/N Comments Fever/Chills n   Chest Pain n   
Poor Appetite    Edema Cough    Abdominal Pain Sputum    Joint Pain SOB/REBOLLAR n   Pruritis/Rash y   
Nausea/vomit n   Tolerating PT/OT Diarrhea    Tolerating Diet Constipation    Other Could NOT obtain due to:   
 
Objective: VITALS:  
Last 24hrs VS reviewed since prior progress note. Most recent are: 
Patient Vitals for the past 24 hrs: 
 Temp Pulse Resp BP SpO2  
11/01/18 1213 97.7 °F (36.5 °C) (!) 57 16 141/65 94 % 11/01/18 0939  66  180/74   
11/01/18 0925  67  190/76   
11/01/18 0923  73  184/83   
11/01/18 0828 97.9 °F (36.6 °C) 68 16 155/69 95 % 11/01/18 0425 97.9 °F (36.6 °C) 65 17 183/72 94 % 10/31/18 2054 97.9 °F (36.6 °C) 62 18 144/55 94 % 10/31/18 1615 98.1 °F (36.7 °C) 64 18 153/68 93 % 10/31/18 1530  64 16 111/44 95 % 10/31/18 1500  62 16 126/56 97 % 10/31/18 1445  (!) 59 16 116/43 99 % 10/31/18 1430  60 20 108/55 94 % 10/31/18 1415  63 16 123/52 92 % 10/31/18 1410  63 14 135/59 95 % 10/31/18 1400  65 11 119/58 96 % 10/31/18 1355  65 11 104/50 96 % 10/31/18 1350  70 13 117/59 95 % 10/31/18 1345  70 14 125/55 95 % 10/31/18 1340  70 14 116/59 96 % 10/31/18 1335  67 13 110/53 96 % 10/31/18 1330  68 14 134/55 96 % 10/31/18 1325  70 14 160/59 100 % 10/31/18 1320  69 15 165/65 96 % Intake/Output Summary (Last 24 hours) at 11/1/2018 1235 Last data filed at 11/1/2018 9330 Gross per 24 hour Intake 300 ml Output 1400 ml Net -1100 ml PHYSICAL EXAM: 
General: WD, WN. Alert, cooperative, no acute distress   
EENT:  EOMI. Anicteric sclerae. MMM Resp:  CTA bilaterally, no wheezing or rales. No accessory muscle use CV:  Regular  rhythm,  No edema GI:  Rash on abd area. Soft, Non distended, Non tender.  +BS Neurologic:  Alert and oriented X 3, normal speech Psych:   Not anxious nor agitated Skin:  No rashes. No jaundice Reviewed most current lab test results and cultures  YES Reviewed most current radiology test results   YES Review and summation of old records today    NO Reviewed patient's current orders and MAR    YES 
PMH/SH reviewed - no change compared to H&P 
________________________________________________________________________ Care Plan discussed with: 
  Comments Patient x Family RN x Care Manager Consultant Multidiciplinary team rounds were held today with , nursing, pharmacist and clinical coordinator. Patient's plan of care was discussed; medications were reviewed and discharge planning was addressed. ________________________________________________________________________ Total NON critical care TIME:  35   Minutes Total CRITICAL CARE TIME Spent:   Minutes non procedure based Comments >50% of visit spent in counseling and coordination of care    
________________________________________________________________________ Ailyn Fan MD  
 
Procedures: see electronic medical records for all procedures/Xrays and details which were not copied into this note but were reviewed prior to creation of Plan. LABS: 
I reviewed today's most current labs and imaging studies. Pertinent labs include: 
Recent Labs 10/30/18 
0444 10/29/18 
1600 WBC 9.6 10.5 HGB 14.4 15.5 HCT 43.4 44.9  255 Recent Labs 11/01/18 
0549 10/31/18 
0441 10/30/18 
0444 10/29/18 
1600  136 133* 131*  
K 3.9 3.5 3.6 3.5  105 103 100 CO2 23 22 22 22 GLU 88 106* 117* 113* BUN 11 12 8 10 CREA 0.48* 0.49* 0.43* 0.48* CA 7.9* 7.7* 8.4* 8.7 MG  --   --  1.9  --   
PHOS  --   --  3.2  --   
ALB  --   --   --  3.7 TBILI  --   --   --  0.6 SGOT  --   --   --  15 ALT  --   --   --  23 Signed: Carlton Ariza MD

## 2018-11-01 NOTE — PROGRESS NOTES
Problem: Self Care Deficits Care Plan (Adult) Goal: *Acute Goals and Plan of Care (Insert Text) Occupational Therapy Goals Initiated 11/1/2018 1. Patient will perform grooming at sink with RW with modified independence within 7 day(s). 2.  Patient will perform bathing with supervision/set-up within 7 day(s). 3.  Patient will perform upper body dressing and lower body dressing with modified independence within 7 day(s). 4.  Patient will perform toilet transfers in bathroom with RW with modified independence within 7 day(s). 5.  Patient will perform all aspects of toileting with modified independence within 7 day(s). 6.  Patient will gather materials for ADL task with RW and modified independence within 7 days. Occupational Therapy EVALUATION Patient: Florencio Ann (83 y.o. female) Date: 11/1/2018 Primary Diagnosis: Compression fracture of L4 lumbar vertebra (HCC) Precautions:   Fall ASSESSMENT : 
Based on the objective data described below, the patient presents with initial c/o back pain with sitting but reports improvement of pain with increased activity s/p admission for acute L4 compression fx. Pt underwent kyphoplasty yesterday. Pt completed log roll with CGA, supine to sit with MIN A x 1. Pt hypertensive during session but asymptomatic. She required CGA x 1 with RW for mobility, CGA to min A for toileting task 2* intermittent posterior rocking in static standing. She is likely close to her baseline, reports several falls at home but denied falls within the community with her RW. Recommend discharge home with [facility] for home safety evaluation with initial 24 hour S from son. Patient will benefit from skilled intervention to address the above impairments. Patients rehabilitation potential is considered to be Good Factors which may influence rehabilitation potential include:  
[]             None noted []             Mental ability/status []             Medical condition [x]             Home/family situation and support systems- hx of falls, lives alone []             Safety awareness []             Pain tolerance/management 
[]             Other: PLAN : 
Recommendations and Planned Interventions: 
[x]               Self Care Training                  [x]        Therapeutic Activities [x]               Functional Mobility Training    []        Cognitive Retraining 
[x]               Therapeutic Exercises           [x]        Endurance Activities [x]               Balance Training                   []        Neuromuscular Re-Education []               Visual/Perceptual Training     [x]   Home Safety Training 
[x]               Patient Education                 [x]        Family Training/Education []               Other (comment): Frequency/Duration: Patient will be followed by occupational therapy 3 times a week to address goals. Discharge Recommendations: [facility] with initial 24 hour supervision from son for safety Further Equipment Recommendations for Discharge: none SUBJECTIVE:  
Patient stated I go to [employer].  OBJECTIVE DATA SUMMARY:  
HISTORY:  
Past Medical History:  
Diagnosis Date  Angina   
 has had mi  Arrhythmia  Cancer ([facility] [address]   
 basal cell removed form back  Chronic pain   
 fibromyalgia  Hypertension  Insomnia  Thyroid disease Past Surgical History:  
Procedure Laterality Date  HX CHOLECYSTECTOMY  HX CORONARY STENT PLACEMENT    
 HX OTHER SURGICAL    
 basal cell removed from back Prior Level of Function/Environment/Context: lives alone, ambulates with RW, mod I for ADLs. Supervision for bathing and tub transfer from \"a girl who helps\" 1-2x/week. Pt otherwise sponge bathes. Pt with hx of falls at home while using her RW per her report. Pt walks about a block with her RW to [employer], uses ramp at her house and ramp built by her neighbor to transition from road to the sidewalk.  Performs light IADLs such as meal prep and laundry Occupations in which the patient is/was successful, what are the barriers preventing that success:  
Performance Patterns (routines, roles, habits, and rituals):  
Personal Interests and/or values: Jain, reading, crocheting Expanded or extensive additional review of patient history: falls, compression fx, HTN Home Situation Home Environment: Private residence Wheelchair Ramp: Yes One/Two Story Residence: One story Living Alone: Yes Support Systems: Family member(s) Patient Expects to be Discharged to[de-identified] Private residence Current DME Used/Available at Home: Farzana Meehan, Andre Higuera, Wheelchair, [address] chair Tub or Shower Type: Tub/Shower combination Hand dominance: RightEXAMINATION OF PERFORMANCE DEFICITS: 
Cognitive/Behavioral Status: 
  
  
  
Perception: Appears intact Perseveration: No perseveration noted Safety/Judgement: Awareness of environment; Fall prevention Skin: intact- L4 bandaids intact Edema: none noted Hearing: Auditory Auditory Impairment: None Vision/Perceptual:   
Tracking: Able to track stimulus in all quadrants w/o difficulty Acuity: Within Defined Limits Corrective Lenses: Glasses Range of Motion: 
 
AROM: Generally decreased, functional 
  
  
  
  
  
  
  
Strength: 
 
Strength: Generally decreased, functional 
  
  
  
  
Coordination: 
Coordination: Within functional limits Fine Motor Skills-Upper: Left Intact; Right Intact Gross Motor Skills-Upper: Left Intact; Right Intact Tone & Sensation: 
 
  
Sensation: Intact Balance: 
Sitting: Intact Standing: Impaired; With support Standing - Static: Good Standing - Dynamic : Fair Functional Mobility and Transfers for ADLs:Bed Mobility: 
Rolling: Contact guard assistance;Assist x1;Additional time Supine to Sit: Minimum assistance;Assist x1;Additional time Scooting: Contact guard assistance;Assist x1;Additional time Transfers: Sit to Stand: Contact guard assistance;Assist x1;Additional time; Adaptive equipment Stand to Sit: Contact guard assistance; Additional time; Adaptive equipment;Assist x1 Bed to Chair: Contact guard assistance;Assist x1;Additional time; Adaptive equipment Bathroom Mobility: Contact guard assistance Toilet Transfer : Contact guard assistance(occasional min A in standing due to posterior lean) ADL Assessment: 
Feeding: Independent Oral Facial Hygiene/Grooming: Stand-by assistance Bathing: Minimum assistance Upper Body Dressing: Setup Lower Body Dressing: Contact guard assistance Toileting: Minimum assistance(clothing manangement/balance checks in standing) ADL Intervention and task modifications: 
  
 
  
Pt educated on role of OT and required verbal cues for safety and proper hand placement during ADLs/functional transfers. Cognitive Retraining Safety/Judgement: Awareness of environment; Fall prevention Functional Measure: 
Barthel Index: 
 
Bathin Bladder: 5 Bowels: 10 
Groomin Dressin Feeding: 10 Mobility: 10 Stairs: 5 Toilet Use: 5 Transfer (Bed to Chair and Back): 10 Total: 65 Barthel and G-code impairment scale: 
Percentage of impairment CH 
0% CI 
1-19% CJ 
20-39% CK 
40-59% CL 
60-79% CM 
80-99% CN 
100% Barthel Score 0-100 100 99-80 79-60 59-40 20-39 1-19 
 0 Barthel Score 0-20 20 17-19 13-16 9-12 5-8 1-4 0 The Barthel ADL Index: Guidelines 1. The index should be used as a record of what a patient does, not as a record of what a patient could do. 2. The main aim is to establish degree of independence from any help, physical or verbal, however minor and for whatever reason. 3. The need for supervision renders the patient not independent. 4. A patient's performance should be established using the best available evidence.  Asking the patient, friends/relatives and nurses are the usual sources, but direct observation and common sense are also important. However direct testing is not needed. 5. Usually the patient's performance over the preceding 24-48 hours is important, but occasionally longer periods will be relevant. 6. Middle categories imply that the patient supplies over 50 per cent of the effort. 7. Use of aids to be independent is allowed. Nika Sanders., Barthel, D.W. (7859). Functional evaluation: the Barthel Index. [address] (14)2. GARRETT Trejo, Palmira [place]., Evaline Madison., Chin, [address] (1999). Measuring the change indisability after inpatient rehabilitation; comparison of the responsiveness of the Barthel Index and Functional [place] Measure. Journal of Neurology, Neurosurgery, and Psychiatry, 66(4), 519-402. JADON Dorman, NOMI [place], & Reggie Monterroso MREGINALDO. (2004.) Assessment of post-stroke quality of life in cost-effectiveness studies: The usefulness of the Barthel Index and the EuroQoL-5D. [facility], 13, 316-73 G codes: In compliance with CMSs Claims Based Outcome Reporting, the following G-code set was chosen for this patient based on their primary functional limitation being treated: The outcome measure chosen to determine the severity of the functional limitation was the barthel index with a score of 65/100 which was correlated with the impairment scale. ? Self Care:  
  - CURRENT STATUS: CJ - 20%-39% impaired, limited or restricted  - GOAL STATUS: CI - 1%-19% impaired, limited or restricted  - D/C STATUS:  ---------------To be determined--------------- Occupational Therapy Evaluation Charge Determination History Examination Decision-Making MEDIUM Complexity : Expanded review of history including physical, cognitive and psychosocial  history  LOW Complexity : 1-3 performance deficits relating to physical, cognitive , or psychosocial skils that result in activity limitations and / or participation restrictions  MEDIUM Complexity : Patient may present with comorbidities that affect occupational performnce. Miniml to moderate modification of tasks or assistance (eg, physical or verbal ) with assesment(s) is necessary to enable patient to complete evaluation Based on the above components, the patient evaluation is determined to be of the following complexity level: LOW Pain: 
Pain Scale 1: Numeric (0 - 10) Pain Intensity 1: 0 Activity Tolerance: VSS Patient Vitals for the past 4 hrs: 
 Pulse BP position 11/01/18 0939 66 180/74 Sitting, post act 11/01/18 0925 67 190/76 standing 11/01/18 0923 73 184/83 Sitting EOB Please refer to the flowsheet for vital signs taken during this treatment. After treatment:  
[x] Patient left in no apparent distress sitting up in chair 
[] Patient left in no apparent distress in bed 
[x] Call bell left within reach [x] Nursing notified 
[] Caregiver present 
[] Bed alarm activated COMMUNICATION/EDUCATION:  
The patients plan of care was discussed with: Physical Therapist and Registered Nurse. [x] Home safety education was provided and the patient/caregiver indicated understanding. [x] Patient/family have participated as able in goal setting and plan of care. [x] Patient/family agree to work toward stated goals and plan of care. [] Patient understands intent and goals of therapy, but is neutral about his/her participation. [] Patient is unable to participate in goal setting and plan of care. This patients plan of care is appropriate for delegation to [facility]. Thank you for this referral. 
Hilda Crane OT Time Calculation: 31 mins

## 2018-11-01 NOTE — PROGRESS NOTES
Gave ordered Aztreonam, pt did not develop any new redness or lesions. She did state she was itching slightly and began scratching at old pink  whelps. I gave her Atarax, upon reading notes from Dr. [facility], it appears that the Aztreonam is being evaluated as a possible allergen as well. Will hold 4am dose, until further clarification is made and med is removed and allergy noted in chart.

## 2018-11-01 NOTE — PROGRESS NOTES
Hematology Oncology Progress Note Follow up for: Possible myeloma Chart notes reviewed since last visit. Case discussed with following: 
 
Patient complains of the following:No complaints Additional concerns noted by the staff:  
 
Patient Vitals for the past 24 hrs: 
 BP Temp Pulse Resp SpO2  
11/01/18 0939 180/74  66    
11/01/18 0925 190/76  67    
11/01/18 0923 184/83  73    
11/01/18 0828 155/69 97.9 °F (36.6 °C) 68 16 95 % 11/01/18 0425 183/72 97.9 °F (36.6 °C) 65 17 94 % 10/31/18 2054 144/55 97.9 °F (36.6 °C) 62 18 94 % 10/31/18 1615 153/68 98.1 °F (36.7 °C) 64 18 93 % 10/31/18 1530 111/44  64 16 95 % 10/31/18 1500 126/56  62 16 97 % 10/31/18 1445 116/43  (!) 59 16 99 % 10/31/18 1430 108/55  60 20 94 % 10/31/18 1415 123/52  63 16 92 % 10/31/18 1410 135/59  63 14 95 % 10/31/18 1400 119/58  65 11 96 % 10/31/18 1355 104/50  65 11 96 % 10/31/18 1350 117/59  70 13 95 % 10/31/18 1345 125/55  70 14 95 % 10/31/18 1340 116/59  70 14 96 % 10/31/18 1335 110/53  67 13 96 % 10/31/18 1330 134/55  68 14 96 % 10/31/18 1325 160/59  70 14 100 % 10/31/18 1320 165/65  69 15 96 % 10/31/18 1214 141/50 98.9 °F (37.2 °C) 67  93 % Review of Systems: 
12 point ROS done and negative except as above Physical Examination: 
Gen NAD 
CV reg Lungs clear 
abd benign Labs: 
Recent Results (from the past 24 hour(s)) METABOLIC PANEL, BASIC Collection Time: 11/01/18  5:49 AM  
Result Value Ref Range Sodium 137 136 - 145 mmol/L Potassium 3.9 3.5 - 5.1 mmol/L Chloride 108 97 - 108 mmol/L  
 CO2 23 21 - 32 mmol/L Anion gap 6 5 - 15 mmol/L Glucose 88 65 - 100 mg/dL BUN 11 6 - 20 MG/DL Creatinine 0.48 (L) 0.55 - 1.02 MG/DL  
 BUN/Creatinine ratio 23 (H) 12 - 20 GFR est AA >60 >60 ml/min/1.73m2 GFR est non-AA >60 >60 ml/min/1.73m2 Calcium 7.9 (L) 8.5 - 10.1 MG/DL Assessment and Plan: 
Possible myeloma or mets to bone -Had kyphoplasty and biopsy yesterday, will see what path shows -Send myeloma labs 
-will need to f/u with me in the office

## 2018-11-01 NOTE — PROGRESS NOTES
Reason for Admission:   Compression fracture of L4 lumbar vertebra ([facility] [address] RRAT Score: 8 LOW Plan for utilizing home health: Per recommendation Likelihood of Readmission:  Moderate per pt acuity and co morbidities Transition of Care Plan:             
Pt is a 80year old,  female, admitted with Compression fracture of L4 lumbar vertebra ([address] CM spoke with pt son via phone. Pt son states pt lives alone in a one level home with limited family available. Pt does have friends for emotional support but not much physical support. Pt son states pt has a cane and does not drive. Pt has had HH in the past but name is unknown. Pt has also been to [place] in the past and family would like pt to go there again if needed. Cm sent referral via [address] FOC filled out with verbal consent and placed on chart. Pt uses mail order prescriptions. Pt son states no problems affording or accessing medications. Pt son will drive pt via private vehicle if pt is going home. If going to SNF pt will need [facility] transportation arranged. In regards to advanced directives- pt son is requesting setting up in hospital as they [facility] not had time\" to do them outside the hospital. CM contacted [facility] who stated they would meet with pt tomorrow (11/2/18) in regards to Advanced Directive planning. Pt son will be up here today 11/1/18 in the evening to address code status with pt and RN/Dr. Darwin Ortiz. CM will continue to follow pt for discharge planning as needed. Care Management Interventions PCP Verified by CM: Yes Mode of Transport at Discharge: Self Transition of Care Consult (CM Consult): Discharge Planning(Hospice) MyChart Signup: No 
Discharge Durable Medical Equipment: No 
Health Maintenance Reviewed: Yes Physical Therapy Consult: Yes Occupational Therapy Consult: Yes Speech Therapy Consult: No 
Current Support Network: Own Home, Lives Alone Confirm Follow Up Transport: Family Plan discussed with Pt/Family/Caregiver: Yes Freedom of Choice Offered: Yes Discharge Location Discharge Placement: Skilled nursing facility [facility], CM [address] [phone]

## 2018-11-01 NOTE — PROGRESS NOTES
Bedside and Verbal shift change report given to Jose Kebede (oncoming nurse) by David Patel (offgoing nurse). Report included the following information SBAR, Kardex, Intake/Output, MAR and Recent Results.

## 2018-11-01 NOTE — PROGRESS NOTES
Problem: Falls - Risk of 
Goal: *Absence of Falls Document Roslyn Lee Fall Risk and appropriate interventions in the flowsheet. Outcome: Progressing Towards Goal 
Fall Risk Interventions: 
Mobility Interventions: Bed/chair exit alarm Mentation Interventions: Adequate sleep, hydration, pain control, Bed/chair exit alarm, Door open when patient unattended, More frequent rounding, Room close to nurse's station Medication Interventions: Bed/chair exit alarm Elimination Interventions: Bed/chair exit alarm, Call light in reach History of Falls Interventions: Bed/chair exit alarm, Door open when patient unattended

## 2018-11-02 VITALS
DIASTOLIC BLOOD PRESSURE: 77 MMHG | SYSTOLIC BLOOD PRESSURE: 147 MMHG | BODY MASS INDEX: 25.16 KG/M2 | OXYGEN SATURATION: 93 % | WEIGHT: 124.78 LBS | TEMPERATURE: 98.1 F | RESPIRATION RATE: 18 BRPM | HEIGHT: 59 IN | HEART RATE: 60 BPM

## 2018-11-02 LAB
ALBUMIN SERPL ELPH-MCNC: 3 G/DL (ref 2.9–4.4)
ALBUMIN/GLOB SERPL: 1.2 {RATIO} (ref 0.7–1.7)
ALPHA1 GLOB SERPL ELPH-MCNC: 0.2 G/DL (ref 0–0.4)
ALPHA2 GLOB SERPL ELPH-MCNC: 0.6 G/DL (ref 0.4–1)
B-GLOBULIN SERPL ELPH-MCNC: 0.7 G/DL (ref 0.7–1.3)
GAMMA GLOB SERPL ELPH-MCNC: 1.2 G/DL (ref 0.4–1.8)
GLOBULIN SER CALC-MCNC: 2.6 G/DL (ref 2.2–3.9)
KAPPA LC FREE SER-MCNC: 37.8 MG/L (ref 3.3–19.4)
KAPPA LC FREE/LAMBDA FREE SER: 0.95 {RATIO} (ref 0.26–1.65)
LAMBDA LC FREE SERPL-MCNC: 39.7 MG/L (ref 5.7–26.3)
M PROTEIN SERPL ELPH-MCNC: ABNORMAL G/DL
PROT SERPL-MCNC: 5.6 G/DL (ref 6–8.5)

## 2018-11-02 PROCEDURE — 74011250637 HC RX REV CODE- 250/637: Performed by: HOSPITALIST

## 2018-11-02 PROCEDURE — 74011250637 HC RX REV CODE- 250/637: Performed by: INTERNAL MEDICINE

## 2018-11-02 RX ADMIN — METOPROLOL TARTRATE 50 MG: 50 TABLET ORAL at 09:20

## 2018-11-02 RX ADMIN — AMLODIPINE BESYLATE 5 MG: 5 TABLET ORAL at 09:20

## 2018-11-02 RX ADMIN — ACETAMINOPHEN 650 MG: 325 TABLET ORAL at 06:25

## 2018-11-02 RX ADMIN — ATORVASTATIN CALCIUM 10 MG: 10 TABLET, FILM COATED ORAL at 09:20

## 2018-11-02 RX ADMIN — DOCUSATE SODIUM 100 MG: 100 CAPSULE, LIQUID FILLED ORAL at 09:20

## 2018-11-02 RX ADMIN — ASPIRIN 81 MG 81 MG: 81 TABLET ORAL at 09:20

## 2018-11-02 RX ADMIN — Medication 10 ML: at 06:25

## 2018-11-02 NOTE — PROGRESS NOTES
Sbar report called to [address] [facility] to [place] Finley Dakins LPN at [phone] with opportunity for questions allowed.

## 2018-11-02 NOTE — PROGRESS NOTES
CM spoke with Dr. Karen Tolentino. Pt has been cleared to d/c. CM spoke with pt son who is aware pt is discharging to [facility] (per family's request) and has no questions or concerns. [facility] transportation arranged through [employer] for 11:00 AM. [facility] paperwork placed on chart. RN informed. No further CM needs identified. Care Management Interventions PCP Verified by CM: Yes Mode of Transport at Discharge: Self Transition of Care Consult (CM Consult): SNF Partner SNF: Yes MyChart Signup: No 
Discharge Durable Medical Equipment: No 
Health Maintenance Reviewed: Yes Physical Therapy Consult: Yes Occupational Therapy Consult: Yes Speech Therapy Consult: No 
Current Support Network: Own Home, Lives Alone Confirm Follow Up Transport: Family Plan discussed with Pt/Family/Caregiver: Yes Freedom of Choice Offered: Yes Discharge Location Discharge Placement: Skilled nursing facility [facility], CM [facility] [phone]

## 2018-11-02 NOTE — DISCHARGE SUMMARY
Discharge Summary      Name: Owen Trujillo  [id number]  YOB: 1927 (Age: 80 y.o.)   Date of Admission: 10/29/2018  Date of Discharge: 11/2/2018  Attending Physician: Anyi Carpenter MD    Discharge Diagnosis:   Osteoporotic compression fracture causing inability to ambulate   Intractable pain   Possible MM  Uncomplicated UTI   Drug rash  Mild hyponatremia   14 mm splenic artery aneurysm  HTN/ CAD ( h/o MI with stents 1999)  Fibromyalgia  HLP    Consultations:  IP CONSULT TO VASCULAR SURGERY  IP CONSULT TO ONCOLOGY    Brief Admission History/Reason for Admission Per Felix Marr MD:   Fina Fajardo is a 80 y.o.  female who presents with above complaints. Pt started with back pain 1 week ago. She denies trauma or fall. Pain was especially worsening in the last 3 days. No dizziness. Pain is exacerbated with movement. Pain is getting better if she is staying still. Pt went to see her PCP today. Her pain was so severe in the office that PCP referred her to ED. Pt ambulates with a walker at baseline. No dysuria/fever/chills.      Brief Hospital Course by Main Problems:   Osteoporotic compression fracture causing inability to ambulate   Intractable pain   Possible myeloma   Xray: Multilevel fractures with progressive collapse of L4. Osteopenia and a very punctate bilateral nonobstructing renal stones. No hydronephrosis. CT: Multiple chronic appearing lower thoracic and lumbar compression deformities. Pain management with tylenol. Would avoid narcotics. So far she has been well controlled with tylenol. Pt gets confused with low dose narcotics. MRI T/L spine showed Diffusely heterogeneous bone marrow pattern with multiple small areas of indistinct increased STIR signal likely related to more focal lesions. This pattern is worrisome for multiple myeloma or metastases.  A more focal expansile  lesion in the left T10 pedicle that causes moderate left neural foraminal narrowing at T10-11. Chronic mild compression deformity of T12. She underwent  kyphoplasty 10/31. Pathology pending. Pt was evaluated in consultation by oncology for concerns of metastatic disease and possible MM. Labs has been ordered by Dr Davon Vargas and pending. Pt will f/u with oncology in regards to labs and path pending.      Uncomplicated UTI   Ucx growing Ecoli, pan-sensitive. Pt was allergic to first dose rocephin (rash). There was questionable rash to  Aztreonam so this was discontinued. She was then given fosfomycinx 1 dose. Repeat UA does not indicate UTI. Pt is now asymptomatic.      Drug rash, improved  Due to allergies to rocephin. Can use prn benadryl if needed. Her rash improved.     Mild hyponatremia, resolved with IVF.    14 mm splenic artery aneurysm  Evaluated by vascular surg, no further vascular evaluation/procedure. Follow up outpt as needed.     HTN/ CAD ( h/o MI with stents 1999), resume home meds.     Fibromyalgia. Avoid sedating meds due to confusion. Reportedly taken valium prn at home, however hasn't needed it here. HLP, cont zocor     Discharge Exam:  Patient seen and examined by me on discharge day. Pertinent Findings:  Visit Vitals  /70 (BP 1 Location: Left arm, BP Patient Position: At rest)   Pulse 67   Temp 98.1 °F (36.7 °C)   Resp 18   Ht 4' 11\" (1.499 m)   Wt 56.6 kg (124 lb 12.5 oz)   SpO2 93%   Breastfeeding? No   BMI 25.20 kg/m²     Gen:    Not in distress  Chest: Clear lungs  CVS:   Regular rhythm. No edema  Abd:  Soft, not distended, not tender    Discharge/Recent Laboratory Results:  Recent Labs     11/01/18  0549      K 3.9      CO2 23   BUN 11   GLU 88   CA 7.9*     No results for input(s): HGB, HCT, WBC, PLT, HGBEXT, HCTEXT, PLTEXT in the last 72 hours.     Discharge Medications:  Current Discharge Medication List      CONTINUE these medications which have NOT CHANGED    Details   cyanocobalamin (VITAMIN B12) 1,000 mcg/mL injection 1,000 mcg by IntraMUSCular route every thirty (30) days. hydroCHLOROthiazide (HYDRODIURIL) 25 mg tablet Take 25 mg by mouth daily. !! metoprolol tartrate (LOPRESSOR) 50 mg tablet Take 50 mg by mouth every morning. !! metoprolol tartrate (LOPRESSOR) 50 mg tablet Take 25 mg by mouth every evening. acetaminophen (TYLENOL) 325 mg tablet Take 325 mg by mouth every six (6) hours as needed for Pain.      multivitamin (ONE A DAY) tablet Take 1 Tab by mouth daily. amLODIPine (NORVASC) 5 mg tablet Take 5 mg by mouth daily. simvastatin (ZOCOR) 10 mg tablet Take 10 mg by mouth daily. nitroglycerin (NITRODUR) 0.2 mg/hr 1 Patch by TransDERmal route daily. trazodone (DESYREL) 50 mg tablet Take 50 mg by mouth nightly. nitroglycerin (NITROSTAT) 0.4 mg SL tablet 0.4 mg by SubLINGual route every five (5) minutes as needed for Chest Pain. Up to 3 doses. Fish Oil-Omega-3 Fatty Acids (FISH OIL) 360-1,200 mg Cap Take 1 Cap by mouth two (2) times a day. !! - Potential duplicate medications found. Please discuss with provider.       STOP taking these medications       diazepam (VALIUM) 2 mg tablet Comments:   Reason for Stopping:               DISPOSITION:    Home with Family:    Home with HH/PT/OT/RN:    SNF/LTC: x   FREDERICK:    OTHER:          Follow up with:   PCP : Benjy Harrington MD  Follow-up Information     Follow up With Specialties Details Why Contact Info    Benjy Harrington MD Internal Medicine In 5 days  [address] [facility] [address] Ina Zamarripa MD Hematology and Oncology, Hematology, Oncology In 2 weeks  [address]  [address]  [facility]  [phone]            Code: Full  Diet: cardiac    Total time in minutes spent coordinating this discharge (includes going over instructions, follow-up, prescriptions, and preparing report for sign off to her PCP) :  35 minutes

## 2018-11-02 NOTE — PROGRESS NOTES
Pt d/toya to [facility] via ambulance ,v/s stable pt. A&0 x3. Skin warn and dry turgor fair,dsg to back dry and intact. offered no c/o pain.

## 2018-11-02 NOTE — PROGRESS NOTES
Son, Janet Forte notified to see if pt has received influenza vaccine this year, if not if he would like pt to receive vaccine prior to discharge. Call back number [phone] given.

## 2018-11-02 NOTE — PROGRESS NOTES
Bedside shift change report given to [place] (oncoming nurse) by Aide Saldana (offgoing nurse). Report included the following information SBAR, Kardex, Intake/Output, MAR, Accordion and Recent Results.

## 2018-11-02 NOTE — PROGRESS NOTES
Hematology Oncology Progress Note Follow up for: Possible myeloma Chart notes reviewed since last visit. Case discussed with following: 
 
Patient complains of the following:No complaints Additional concerns noted by the staff:  
 
Patient Vitals for the past 24 hrs: 
 BP Temp Pulse Resp SpO2  
11/02/18 0745 159/70 98.1 °F (36.7 °C) 67 18 93 % 11/02/18 0411 162/72 98.1 °F (36.7 °C) 60 16 92 % 11/02/18 0037 136/62 97.8 °F (36.6 °C) (!) 53 20 90 % 11/01/18 2122 138/66 97.9 °F (36.6 °C) (!) 58 16 93 % 11/01/18 1500 149/78 97.6 °F (36.4 °C) 61 18 94 % 11/01/18 1213 141/65 97.7 °F (36.5 °C) (!) 57 16 94 % Review of Systems: 
12 point ROS done and negative except as above Physical Examination: 
Gen NAD 
CV reg Lungs clear 
abd benign Labs: 
No results found for this or any previous visit (from the past 24 hour(s)). Assessment and Plan: 
Possible myeloma or mets to bone 
-Had kyphoplasty and biopsy Wednesday, will see what path shows -Send myeloma labs 
-will set up for f/u with me in the office Please call with any questions

## 2018-11-02 NOTE — PROGRESS NOTES
Problem: Falls - Risk of 
Goal: *Absence of Falls Document Everardo Burden Fall Risk and appropriate interventions in the flowsheet. Outcome: Progressing Towards Goal 
Fall Risk Interventions: 
Mobility Interventions: Communicate number of staff needed for ambulation/transfer, Bed/chair exit alarm, Patient to call before getting OOB Mentation Interventions: Bed/chair exit alarm, Adequate sleep, hydration, pain control, Door open when patient unattended, Reorient patient, More frequent rounding Medication Interventions: Bed/chair exit alarm, Patient to call before getting OOB Elimination Interventions: Bed/chair exit alarm, Call light in reach, Patient to call for help with toileting needs History of Falls Interventions: Door open when patient unattended, Room close to nurse's station

## 2018-11-03 LAB
BACTERIA SPEC CULT: NORMAL
SERVICE CMNT-IMP: NORMAL

## 2018-11-05 LAB
IGA SERPL-MCNC: 280 MG/DL (ref 64–422)
IGG SERPL-MCNC: 1134 MG/DL (ref 700–1600)
IGM SERPL-MCNC: 77 MG/DL (ref 26–217)
PROT PATTERN SERPL IFE-IMP: NORMAL

## 2018-11-08 ENCOUNTER — PATIENT OUTREACH (OUTPATIENT)
Dept: CASE MANAGEMENT | Age: 83
End: 2018-11-08

## 2018-11-08 NOTE — PROGRESS NOTES
Community Care Team documentation for patient in [facility] Initial Follow Up Patient was admitted to [facility] on 10/29 and discharged 11/2 to [facility]. Hospital Discharge diagnosis:  Osteoporotic compression fracture causing inability to ambulate RRAT score: 8 Advance Care Planning:  Not on file. PCP : Wayne Bergeron MD 
 
SNF Attending: Jayde Pacheco MD 
 
Spoke with SNF team.  Ensured patient arrived to SNF safely with admission packet in order. Discussed needed appointment; Kylei Mahajan MD  In 2 weeks - son to schedule. Pt is being skilled by PT/OT/SLP. Ambulating 200ft with contact guard assist. Min to mod assist with ADLs. 2-3 weeks LOS anticipated. Plan to discharge home alone with family assistance and [facility] pending HH preference. Community Care Team will follow up weekly with [facility] until discharge. Medications were not reconciled and general patient assessment was not completed during this [facility] outreach.

## 2018-11-15 ENCOUNTER — PATIENT OUTREACH (OUTPATIENT)
Dept: CASE MANAGEMENT | Age: 83
End: 2018-11-15

## 2018-11-15 NOTE — PROGRESS NOTES
Community Care Team Documentation for Patient in [facility] Subsequent Follow up Patient remains at [facility] ([facility]). See previous [facility] Team notes. PCP : Shane Powell MD 
 
Spoke with SNF team. PT/OT continue. 1-2 weeks LOS anticipated. Plan to discharge home alone with family assistance and [facility] pending HH preference. Medications were not reconciled and general patient assessment was not completed during this skilled nursing facility outreach.

## 2018-11-21 ENCOUNTER — PATIENT OUTREACH (OUTPATIENT)
Dept: CASE MANAGEMENT | Age: 83
End: 2018-11-21

## 2018-11-21 NOTE — PROGRESS NOTES
Community Care Team Documentation for Patient in [facility] Subsequent Follow up Patient remains at [facility] ([facility]). See previous [facility] Team notes. PCP : Virgilio Wells MD 
 
Spoke with SNF team. PT/OT continue. Pt ambulating 400ft with RW and supervision. Stand by assist to supervision with transfers. Contact guard with lower body ADLs. Plan to discharge 11/27 home alone with family assistance and [facility] pending preference. Medications were not reconciled and general patient assessment was not completed during this skilled nursing facility outreach.

## 2018-11-28 ENCOUNTER — PATIENT OUTREACH (OUTPATIENT)
Dept: CASE MANAGEMENT | Age: 83
End: 2018-11-28

## 2018-11-28 NOTE — PROGRESS NOTES
Community Care Team Documentation for Patient in [facility] Discharge Note Patient has been discharged from [facility] ([facility]). See previous [facility] Team notes. PCP : Jesús Elizalde MD 
 
Spoke with SNF team.  Confirmed patient was discharged 11/27 to home alone with [facility] and family assistance. Community Care Team will sign off at this time. Medications were not reconciled and general patient assessment was not completed during this skilled nursing facility outreach. Dixon Cheng, MSN, RN, ACNS-BC, [facility] Nurse Navigator, [address] [place] [address] [phone]

## 2019-06-08 ENCOUNTER — HOSPITAL ENCOUNTER (INPATIENT)
Age: 84
LOS: 5 days | Discharge: NURSING, REHAB OR LONG-TERM CARE | DRG: 515 | End: 2019-06-13
Attending: EMERGENCY MEDICINE | Admitting: INTERNAL MEDICINE
Payer: MEDICARE

## 2019-06-08 ENCOUNTER — APPOINTMENT (OUTPATIENT)
Dept: GENERAL RADIOLOGY | Age: 84
DRG: 515 | End: 2019-06-08
Attending: INTERNAL MEDICINE
Payer: MEDICARE

## 2019-06-08 ENCOUNTER — APPOINTMENT (OUTPATIENT)
Dept: GENERAL RADIOLOGY | Age: 84
DRG: 515 | End: 2019-06-08
Attending: EMERGENCY MEDICINE
Payer: MEDICARE

## 2019-06-08 ENCOUNTER — APPOINTMENT (OUTPATIENT)
Dept: CT IMAGING | Age: 84
DRG: 515 | End: 2019-06-08
Attending: INTERNAL MEDICINE
Payer: MEDICARE

## 2019-06-08 ENCOUNTER — APPOINTMENT (OUTPATIENT)
Dept: ULTRASOUND IMAGING | Age: 84
DRG: 515 | End: 2019-06-08
Attending: EMERGENCY MEDICINE
Payer: MEDICARE

## 2019-06-08 ENCOUNTER — APPOINTMENT (OUTPATIENT)
Dept: CT IMAGING | Age: 84
DRG: 515 | End: 2019-06-08
Attending: EMERGENCY MEDICINE
Payer: MEDICARE

## 2019-06-08 DIAGNOSIS — S22.000A THORACIC COMPRESSION FRACTURE, CLOSED, INITIAL ENCOUNTER (HCC): ICD-10-CM

## 2019-06-08 DIAGNOSIS — Z71.89 GOALS OF CARE, COUNSELING/DISCUSSION: ICD-10-CM

## 2019-06-08 DIAGNOSIS — M54.5 ACUTE LOW BACK PAIN, UNSPECIFIED BACK PAIN LATERALITY, WITH SCIATICA PRESENCE UNSPECIFIED: ICD-10-CM

## 2019-06-08 DIAGNOSIS — J18.9 PNEUMONIA OF LEFT LOWER LOBE DUE TO INFECTIOUS ORGANISM: ICD-10-CM

## 2019-06-08 DIAGNOSIS — Z71.89 ADVANCED CARE PLANNING/COUNSELING DISCUSSION: ICD-10-CM

## 2019-06-08 DIAGNOSIS — F41.9 ANXIETY: Primary | ICD-10-CM

## 2019-06-08 DIAGNOSIS — R54 FRAILTY: ICD-10-CM

## 2019-06-08 LAB
ALBUMIN SERPL-MCNC: 3.6 G/DL (ref 3.5–5)
ALBUMIN/GLOB SERPL: 0.8 {RATIO} (ref 1.1–2.2)
ALP SERPL-CCNC: 95 U/L (ref 45–117)
ALT SERPL-CCNC: 20 U/L (ref 12–78)
ANION GAP SERPL CALC-SCNC: 5 MMOL/L (ref 5–15)
APPEARANCE UR: ABNORMAL
AST SERPL-CCNC: 24 U/L (ref 15–37)
BACTERIA URNS QL MICRO: ABNORMAL /HPF
BASOPHILS # BLD: 0.1 K/UL (ref 0–0.1)
BASOPHILS NFR BLD: 1 % (ref 0–1)
BILIRUB SERPL-MCNC: 0.6 MG/DL (ref 0.2–1)
BILIRUB UR QL: NEGATIVE
BUN SERPL-MCNC: 9 MG/DL (ref 6–20)
BUN/CREAT SERPL: 16 (ref 12–20)
CALCIUM SERPL-MCNC: 9.1 MG/DL (ref 8.5–10.1)
CHLORIDE SERPL-SCNC: 101 MMOL/L (ref 97–108)
CO2 SERPL-SCNC: 30 MMOL/L (ref 21–32)
COLOR UR: ABNORMAL
CREAT SERPL-MCNC: 0.57 MG/DL (ref 0.55–1.02)
D DIMER PPP FEU-MCNC: 1.17 MG/L FEU (ref 0–0.65)
DIFFERENTIAL METHOD BLD: ABNORMAL
EOSINOPHIL # BLD: 0.1 K/UL (ref 0–0.4)
EOSINOPHIL NFR BLD: 1 % (ref 0–7)
EPITH CASTS URNS QL MICRO: ABNORMAL /LPF
ERYTHROCYTE [DISTWIDTH] IN BLOOD BY AUTOMATED COUNT: 17.3 % (ref 11.5–14.5)
GLOBULIN SER CALC-MCNC: 4.3 G/DL (ref 2–4)
GLUCOSE SERPL-MCNC: 109 MG/DL (ref 65–100)
GLUCOSE UR STRIP.AUTO-MCNC: NEGATIVE MG/DL
HCT VFR BLD AUTO: 50.5 % (ref 35–47)
HGB BLD-MCNC: 16.6 G/DL (ref 11.5–16)
HGB UR QL STRIP: NEGATIVE
HYALINE CASTS URNS QL MICRO: ABNORMAL /LPF (ref 0–5)
IMM GRANULOCYTES # BLD AUTO: 0.2 K/UL (ref 0–0.04)
IMM GRANULOCYTES NFR BLD AUTO: 2 % (ref 0–0.5)
KETONES UR QL STRIP.AUTO: NEGATIVE MG/DL
LACTATE SERPL-SCNC: 0.9 MMOL/L (ref 0.4–2)
LEUKOCYTE ESTERASE UR QL STRIP.AUTO: ABNORMAL
LIPASE SERPL-CCNC: 51 U/L (ref 73–393)
LYMPHOCYTES # BLD: 1 K/UL (ref 0.8–3.5)
LYMPHOCYTES NFR BLD: 10 % (ref 12–49)
MCH RBC QN AUTO: 30.3 PG (ref 26–34)
MCHC RBC AUTO-ENTMCNC: 32.9 G/DL (ref 30–36.5)
MCV RBC AUTO: 92.2 FL (ref 80–99)
MONOCYTES # BLD: 0.7 K/UL (ref 0–1)
MONOCYTES NFR BLD: 7 % (ref 5–13)
NEUTS SEG # BLD: 8.2 K/UL (ref 1.8–8)
NEUTS SEG NFR BLD: 79 % (ref 32–75)
NITRITE UR QL STRIP.AUTO: NEGATIVE
NRBC # BLD: 0 K/UL (ref 0–0.01)
NRBC BLD-RTO: 0 PER 100 WBC
PH UR STRIP: 8.5 [PH] (ref 5–8)
PLATELET # BLD AUTO: 301 K/UL (ref 150–400)
PMV BLD AUTO: 10 FL (ref 8.9–12.9)
POTASSIUM SERPL-SCNC: 3.8 MMOL/L (ref 3.5–5.1)
PROT SERPL-MCNC: 7.9 G/DL (ref 6.4–8.2)
PROT UR STRIP-MCNC: 100 MG/DL
RBC # BLD AUTO: 5.48 M/UL (ref 3.8–5.2)
RBC #/AREA URNS HPF: ABNORMAL /HPF (ref 0–5)
SODIUM SERPL-SCNC: 136 MMOL/L (ref 136–145)
SP GR UR REFRACTOMETRY: 1.01 (ref 1–1.03)
UROBILINOGEN UR QL STRIP.AUTO: 0.2 EU/DL (ref 0.2–1)
WBC # BLD AUTO: 10.3 K/UL (ref 3.6–11)
WBC URNS QL MICRO: ABNORMAL /HPF (ref 0–4)

## 2019-06-08 PROCEDURE — 74011636320 HC RX REV CODE- 636/320: Performed by: EMERGENCY MEDICINE

## 2019-06-08 PROCEDURE — 80053 COMPREHEN METABOLIC PANEL: CPT

## 2019-06-08 PROCEDURE — 85025 COMPLETE CBC W/AUTO DIFF WBC: CPT

## 2019-06-08 PROCEDURE — 81001 URINALYSIS AUTO W/SCOPE: CPT

## 2019-06-08 PROCEDURE — 99285 EMERGENCY DEPT VISIT HI MDM: CPT

## 2019-06-08 PROCEDURE — 83690 ASSAY OF LIPASE: CPT

## 2019-06-08 PROCEDURE — 96374 THER/PROPH/DIAG INJ IV PUSH: CPT

## 2019-06-08 PROCEDURE — 36415 COLL VENOUS BLD VENIPUNCTURE: CPT

## 2019-06-08 PROCEDURE — 74011250636 HC RX REV CODE- 250/636: Performed by: EMERGENCY MEDICINE

## 2019-06-08 PROCEDURE — 87040 BLOOD CULTURE FOR BACTERIA: CPT

## 2019-06-08 PROCEDURE — 85379 FIBRIN DEGRADATION QUANT: CPT

## 2019-06-08 PROCEDURE — 73070 X-RAY EXAM OF ELBOW: CPT

## 2019-06-08 PROCEDURE — 96375 TX/PRO/DX INJ NEW DRUG ADDON: CPT

## 2019-06-08 PROCEDURE — 83605 ASSAY OF LACTIC ACID: CPT

## 2019-06-08 PROCEDURE — 74011250636 HC RX REV CODE- 250/636: Performed by: INTERNAL MEDICINE

## 2019-06-08 PROCEDURE — 74011250637 HC RX REV CODE- 250/637: Performed by: INTERNAL MEDICINE

## 2019-06-08 PROCEDURE — 96376 TX/PRO/DX INJ SAME DRUG ADON: CPT

## 2019-06-08 PROCEDURE — 71101 X-RAY EXAM UNILAT RIBS/CHEST: CPT

## 2019-06-08 PROCEDURE — 74011250637 HC RX REV CODE- 250/637: Performed by: EMERGENCY MEDICINE

## 2019-06-08 PROCEDURE — 93970 EXTREMITY STUDY: CPT

## 2019-06-08 PROCEDURE — 70450 CT HEAD/BRAIN W/O DYE: CPT

## 2019-06-08 PROCEDURE — 65660000000 HC RM CCU STEPDOWN

## 2019-06-08 PROCEDURE — 74176 CT ABD & PELVIS W/O CONTRAST: CPT

## 2019-06-08 PROCEDURE — 73080 X-RAY EXAM OF ELBOW: CPT

## 2019-06-08 RX ORDER — GLUCOSAM/CHONDRO/HERB 149/HYAL 750-100 MG
1 TABLET ORAL 2 TIMES DAILY
Status: DISCONTINUED | OUTPATIENT
Start: 2019-06-08 | End: 2019-06-13 | Stop reason: HOSPADM

## 2019-06-08 RX ORDER — LISINOPRIL 5 MG/1
10 TABLET ORAL DAILY
Status: DISCONTINUED | OUTPATIENT
Start: 2019-06-09 | End: 2019-06-13 | Stop reason: HOSPADM

## 2019-06-08 RX ORDER — DIAZEPAM 2 MG/1
TABLET ORAL
Status: ON HOLD | COMMUNITY
End: 2019-06-13 | Stop reason: SDUPTHER

## 2019-06-08 RX ORDER — GUAIFENESIN 100 MG/5ML
81 LIQUID (ML) ORAL DAILY
Status: DISCONTINUED | OUTPATIENT
Start: 2019-06-09 | End: 2019-06-13 | Stop reason: HOSPADM

## 2019-06-08 RX ORDER — FENTANYL CITRATE 50 UG/ML
25 INJECTION, SOLUTION INTRAMUSCULAR; INTRAVENOUS
Status: COMPLETED | OUTPATIENT
Start: 2019-06-08 | End: 2019-06-08

## 2019-06-08 RX ORDER — SODIUM CHLORIDE 0.9 % (FLUSH) 0.9 %
5-40 SYRINGE (ML) INJECTION AS NEEDED
Status: DISCONTINUED | OUTPATIENT
Start: 2019-06-08 | End: 2019-06-13 | Stop reason: HOSPADM

## 2019-06-08 RX ORDER — CAPTOPRIL 100 MG/1
100 TABLET ORAL 3 TIMES DAILY
Status: ON HOLD | COMMUNITY
End: 2019-06-10

## 2019-06-08 RX ORDER — GUAIFENESIN 100 MG/5ML
81 LIQUID (ML) ORAL DAILY
COMMUNITY

## 2019-06-08 RX ORDER — ONDANSETRON 2 MG/ML
4 INJECTION INTRAMUSCULAR; INTRAVENOUS
Status: DISCONTINUED | OUTPATIENT
Start: 2019-06-08 | End: 2019-06-13 | Stop reason: HOSPADM

## 2019-06-08 RX ORDER — IPRATROPIUM BROMIDE AND ALBUTEROL SULFATE 2.5; .5 MG/3ML; MG/3ML
3 SOLUTION RESPIRATORY (INHALATION)
Status: DISCONTINUED | OUTPATIENT
Start: 2019-06-08 | End: 2019-06-13 | Stop reason: HOSPADM

## 2019-06-08 RX ORDER — SODIUM CHLORIDE 0.9 % (FLUSH) 0.9 %
5-40 SYRINGE (ML) INJECTION EVERY 8 HOURS
Status: DISCONTINUED | OUTPATIENT
Start: 2019-06-08 | End: 2019-06-13 | Stop reason: HOSPADM

## 2019-06-08 RX ORDER — PRAVASTATIN SODIUM 40 MG/1
20 TABLET ORAL DAILY
Status: DISCONTINUED | OUTPATIENT
Start: 2019-06-09 | End: 2019-06-13 | Stop reason: HOSPADM

## 2019-06-08 RX ORDER — METOPROLOL TARTRATE 50 MG/1
50 TABLET ORAL
Status: DISCONTINUED | OUTPATIENT
Start: 2019-06-09 | End: 2019-06-13 | Stop reason: HOSPADM

## 2019-06-08 RX ORDER — HEPARIN SODIUM 5000 [USP'U]/ML
5000 INJECTION, SOLUTION INTRAVENOUS; SUBCUTANEOUS EVERY 12 HOURS
Status: DISCONTINUED | OUTPATIENT
Start: 2019-06-08 | End: 2019-06-11

## 2019-06-08 RX ORDER — TRAZODONE HYDROCHLORIDE 50 MG/1
50 TABLET ORAL
Status: DISCONTINUED | OUTPATIENT
Start: 2019-06-08 | End: 2019-06-13 | Stop reason: HOSPADM

## 2019-06-08 RX ORDER — MORPHINE SULFATE 2 MG/ML
1 INJECTION, SOLUTION INTRAMUSCULAR; INTRAVENOUS
Status: DISCONTINUED | OUTPATIENT
Start: 2019-06-08 | End: 2019-06-11

## 2019-06-08 RX ORDER — ONDANSETRON 2 MG/ML
4 INJECTION INTRAMUSCULAR; INTRAVENOUS
Status: COMPLETED | OUTPATIENT
Start: 2019-06-08 | End: 2019-06-08

## 2019-06-08 RX ORDER — DOXYCYCLINE HYCLATE 100 MG
100 TABLET ORAL
Status: COMPLETED | OUTPATIENT
Start: 2019-06-08 | End: 2019-06-08

## 2019-06-08 RX ORDER — HYDROCODONE BITARTRATE AND ACETAMINOPHEN 5; 325 MG/1; MG/1
1 TABLET ORAL
Status: DISCONTINUED | OUTPATIENT
Start: 2019-06-08 | End: 2019-06-11

## 2019-06-08 RX ORDER — POLYETHYLENE GLYCOL 3350 17 G/17G
17 POWDER, FOR SOLUTION ORAL DAILY
Status: DISCONTINUED | OUTPATIENT
Start: 2019-06-09 | End: 2019-06-13 | Stop reason: HOSPADM

## 2019-06-08 RX ORDER — METOPROLOL TARTRATE 25 MG/1
25 TABLET, FILM COATED ORAL EVERY EVENING
Status: DISCONTINUED | OUTPATIENT
Start: 2019-06-08 | End: 2019-06-13 | Stop reason: HOSPADM

## 2019-06-08 RX ORDER — HYDRALAZINE HYDROCHLORIDE 20 MG/ML
10 INJECTION INTRAMUSCULAR; INTRAVENOUS
Status: DISCONTINUED | OUTPATIENT
Start: 2019-06-08 | End: 2019-06-13 | Stop reason: HOSPADM

## 2019-06-08 RX ORDER — AMLODIPINE BESYLATE 5 MG/1
5 TABLET ORAL DAILY
Status: DISCONTINUED | OUTPATIENT
Start: 2019-06-09 | End: 2019-06-13 | Stop reason: HOSPADM

## 2019-06-08 RX ORDER — SODIUM CHLORIDE 9 MG/ML
75 INJECTION, SOLUTION INTRAVENOUS CONTINUOUS
Status: DISCONTINUED | OUTPATIENT
Start: 2019-06-08 | End: 2019-06-11

## 2019-06-08 RX ORDER — ACETAMINOPHEN 325 MG/1
650 TABLET ORAL
Status: DISCONTINUED | OUTPATIENT
Start: 2019-06-08 | End: 2019-06-11

## 2019-06-08 RX ORDER — HYDROCHLOROTHIAZIDE 25 MG/1
25 TABLET ORAL DAILY
Status: DISCONTINUED | OUTPATIENT
Start: 2019-06-09 | End: 2019-06-11

## 2019-06-08 RX ORDER — METHOCARBAMOL 500 MG/1
500 TABLET, FILM COATED ORAL
Status: COMPLETED | OUTPATIENT
Start: 2019-06-08 | End: 2019-06-08

## 2019-06-08 RX ADMIN — OMEGA-3 FATTY ACIDS CAP 1000 MG 1 CAPSULE: 1000 CAP at 22:11

## 2019-06-08 RX ADMIN — TRAZODONE HYDROCHLORIDE 50 MG: 50 TABLET ORAL at 22:11

## 2019-06-08 RX ADMIN — ONDANSETRON 4 MG: 2 INJECTION INTRAMUSCULAR; INTRAVENOUS at 16:02

## 2019-06-08 RX ADMIN — DOXYCYCLINE HYCLATE 100 MG: 100 TABLET, COATED ORAL at 19:29

## 2019-06-08 RX ADMIN — HEPARIN SODIUM 5000 UNITS: 5000 INJECTION INTRAVENOUS; SUBCUTANEOUS at 22:11

## 2019-06-08 RX ADMIN — IOHEXOL 50 ML: 240 INJECTION, SOLUTION INTRATHECAL; INTRAVASCULAR; INTRAVENOUS; ORAL at 14:19

## 2019-06-08 RX ADMIN — ONDANSETRON 4 MG: 2 INJECTION INTRAMUSCULAR; INTRAVENOUS at 14:19

## 2019-06-08 RX ADMIN — FENTANYL CITRATE 25 MCG: 50 INJECTION, SOLUTION INTRAMUSCULAR; INTRAVENOUS at 14:19

## 2019-06-08 RX ADMIN — Medication 10 ML: at 22:12

## 2019-06-08 RX ADMIN — FENTANYL CITRATE 25 MCG: 50 INJECTION, SOLUTION INTRAMUSCULAR; INTRAVENOUS at 16:02

## 2019-06-08 RX ADMIN — METHOCARBAMOL TABLETS 500 MG: 500 TABLET, COATED ORAL at 17:24

## 2019-06-08 RX ADMIN — METOPROLOL TARTRATE 25 MG: 25 TABLET ORAL at 22:11

## 2019-06-08 RX ADMIN — HYDROCODONE BITARTRATE AND ACETAMINOPHEN 1 TABLET: 5; 325 TABLET ORAL at 22:11

## 2019-06-08 NOTE — ED NOTES
Pt in pain with any movement. Pt unable to tolerate getting out of bed at this time. While repositioning pt she moans in pain. Discussed this with Dr. Ng.

## 2019-06-08 NOTE — PROGRESS NOTES
Heparin decreased to 5000 units every 12 hours for weight < 60 kg per protocol    Gregor Tucker PHARMD

## 2019-06-08 NOTE — H&P
Hospitalist Admission Note    NAME: Stephania Kelly   :  1927   MRN:  [id number]     Date/Time:  2019 7:56 PM    Patient PCP: Rita Ugalde MD  ______________________________________________________________________  Given the patient's current clinical presentation, I have a high level of concern for decompensation if discharged from the emergency department. Complex decision making was performed, which includes reviewing the patient's available past medical records, laboratory results, and x-ray films. My assessment of this patient's clinical condition and my plan of care is as follows. Assessment / Plan:    Osteoporotic Compression fracture causing inability to ambulate   Intractable pain   Ct  1. Osteopenia. Interval fractures of T10 and T11. The T11 fracture appears acute  2. Essentially unchanged splenic artery aneurysm  3. Otherwise no acute abnormality of the abdomen and pelvis  xrays  Osteopenia. A displaced rib fracture is not identified  2. Diffuse interstitial changes most confluent at the left base  --MRI lumbar and thoracic   -PT/OT /fall precautions   Ortho to see  Pain meds add miralax   Check ct head     CAP ?   Check la/procalcitonin   Blood cx  Cont doxycycline   Check xrays    Rt elbow pain xrays pain meds  Bilateral  calf pain r/o dvt by doppler      15 mm splenic artery aneurysm unchanged  -by CT, will ask vascular to review      HTN/ CAD ( h/o MI with stents )   -BP stable  -pharmacy consulted for med rec  -cont norvasc/ metoprolol/lisinopril unsure of dose start low dose  Iv hydralazine prn     Fibromyalgia,trazodone for sleep   HLP, cont zocor         Pharmacy consulted for med rec         Code Status: Full code d/w pt and son at bedside but would not want to remain in vegetable state   Surrogate Decision Maker: son      DVT Prophylaxis:  Heparin   GI Prophylaxis: not indicated     Baseline: lives alone; ambulating with cane/walker  has 1 son has aid             Subjective:   CHIEF COMPLAINT: fall/back pain    HISTORY OF PRESENT ILLNESS:   Per er notes  Srinivas Parks is a 80 y.o. female with PMHx significant for arrhythmia, CAD, and hypertension, presents  to the ED with cc of lower back pain. S/p fall thsi week- 911 was called- no er visit  Still ambulates with walker/cane  But since fall much less. Her pain is located in the right flank and midline. It is currently an 8 out of 10 in severity. She denies incontinence, dysuria or diarrhea. She denies numbness or tingling. She denies shortness of breath or chest pain. According to her son, she complained of abdominal discomfort earlier today. No n/v. No uti sx. She denies that currently, but does states she cannot remember the last time she had a bowel movement. She denies fever or chills. She denies lightheadedness or dizziness. She also denies headache and neck pain. Patient states she is always had this. She denies leg pain.               We were asked to admit for work up and evaluation of the above problems. Past Medical History:   Diagnosis Date    Angina     has had mi    Arrhythmia     Cancer ([facility] [address]     basal cell removed form back    Chronic pain     fibromyalgia    Hypertension     Insomnia     Thyroid disease         Past Surgical History:   Procedure Laterality Date    HX CHOLECYSTECTOMY      HX CORONARY STENT PLACEMENT      HX OTHER SURGICAL      basal cell removed from back       Social History     Tobacco Use    Smoking status: Never Smoker    Smokeless tobacco: Never Used   Substance Use Topics    Alcohol use: No        History reviewed. No pertinent family history.   Allergies   Allergen Reactions    Ceftriaxone Hives    Benadryl [Diphenhydramine Hcl] Hives and Rash     Patient's son says she's not allergic    Celebrex [Celecoxib] Rash    Ciprofloxacin Rash    Ivp [Fd And C Blue No.1] Rash and Swelling    Macrobid [Nitrofurantoin Monohyd/M-Cryst] Swelling  Niaspan [Niacin] Palpitations    Pcn [Penicillins] Rash and Swelling    Seafood [Shellfish Containing Products] Rash and Swelling        Prior to Admission medications    Medication Sig Start Date End Date Taking? Authorizing Provider   captopril (CAPOTEN) 100 mg tablet Take 100 mg by mouth three (3) times daily. Yes Luana Corrigan MD   LISINOPRIL PO Take  by mouth. Yes Luana Corrigan MD   diazePAM (VALIUM) 2 mg tablet Take  by mouth every six (6) hours as needed for Anxiety. Yes Meenu, MD Luana   aspirin 81 mg chewable tablet Take 81 mg by mouth daily. Yes Luana Corrigan MD   hydroCHLOROthiazide (HYDRODIURIL) 25 mg tablet Take 25 mg by mouth daily. Yes Provider, Historical   metoprolol tartrate (LOPRESSOR) 50 mg tablet Take 50 mg by mouth every morning. Yes Provider, Historical   multivitamin (ONE A DAY) tablet Take 1 Tab by mouth daily. Yes Provider, Historical   amLODIPine (NORVASC) 5 mg tablet Take 5 mg by mouth daily. Yes Provider, Historical   simvastatin (ZOCOR) 10 mg tablet Take 10 mg by mouth daily. Yes Provider, Historical   cyanocobalamin (VITAMIN B12) 1,000 mcg/mL injection 1,000 mcg by IntraMUSCular route every thirty (30) days. Provider, Historical   metoprolol tartrate (LOPRESSOR) 50 mg tablet Take 25 mg by mouth every evening. Provider, Historical   acetaminophen (TYLENOL) 325 mg tablet Take 325 mg by mouth every six (6) hours as needed for Pain. Provider, Historical   nitroglycerin (NITROSTAT) 0.4 mg SL tablet 0.4 mg by SubLINGual route every five (5) minutes as needed for Chest Pain. Up to 3 doses. Provider, Historical   nitroglycerin (NITRODUR) 0.2 mg/hr 1 Patch by TransDERmal route daily. Other, MD Luana   trazodone (DESYREL) 50 mg tablet Take 50 mg by mouth nightly. Luana Corrigan MD   Fish Oil-Omega-3 Fatty Acids (FISH OIL) 360-1,200 mg Cap Take 1 Cap by mouth two (2) times a day.  12/2/10   Provider, Historical       REVIEW OF SYSTEMS:     I am not able to complete the review of systems because: The patient is intubated and sedated    The patient has altered mental status due to his acute medical problems    The patient has baseline aphasia from prior stroke(s)    The patient has baseline dementia and is not reliable historian    The patient is in acute medical distress and unable to provide information           Total of 12 systems reviewed as follows:       POSITIVE= underlined text  Negative = text not underlined  General:  fever, chills, sweats, generalized weakness, weight loss/gain,      loss of appetite   Eyes:    blurred vision, eye pain, loss of vision, double vision  ENT:    rhinorrhea, pharyngitis   Respiratory:   cough, sputum production, SOB, REBOLLAR, wheezing, pleuritic pain   Cardiology:   chest pain, palpitations, orthopnea, PND, edema, syncope   Gastrointestinal:  abdominal pain , N/V, diarrhea, dysphagia, constipation, bleeding   Genitourinary:  frequency, urgency, dysuria, hematuria, incontinence   Muskuloskeletal :  arthralgia, myalgia, back pain  Hematology:  easy bruising, nose or gum bleeding, lymphadenopathy  Elbow pain rt  Dermatological: rash, ulceration, pruritis, color change / jaundice  Endocrine:   hot flashes or polydipsia   Neurological:  headache, dizziness, confusion, focal weakness, paresthesia,     Speech difficulties, memory loss, gait difficulty  Psychological: Feelings of anxiety, depression, agitation    Objective:   VITALS:    Visit Vitals  /70   Pulse 81   Temp 97.8 °F (36.6 °C)   Resp 12   Ht 4' 11\" (1.499 m)   Wt 56.2 kg (124 lb)   SpO2 98%   BMI 25.04 kg/m²       PHYSICAL EXAM:    General:    Alert, cooperative, no distress, appears stated age. HEENT: Atraumatic, anicteric sclerae, pink conjunctivae     No oral ulcers, mucosa dry , throat clear, dentition fair  Neck:  Supple, symmetrical,  thyroid: non tender no meningismus   Lungs:   decrease bs  bilaterally. No Wheezing or Rhonchi. No rales.   Chest wall:  No tenderness  No Accessory muscle use. Heart:   Regular  rhythm,  No  murmur   Trace pedal edema hakeem  Abdomen:   Soft, + tender rt left q. Not distended. Bowel sounds normal  Extremities: No cyanosis. No clubbing,      Skin turgor normal, Capillary refill normal, Radial dial pulse 2+ + ventral hernia noted  Skin:     Not pale. Not Jaundiced  No rashes + erythema hakeem shins + calf ttp  Psych:  Fair insight. Not depressed. Not anxious or agitated. Neurologic: EOMs intact. No facial asymmetry. No aphasia or slurred speech. Symmetrical strength, Sensation grossly intact. Alert and oriented X 2.     _______________________________________________________________________  Care Plan discussed with:    Comments   Patient x    Family  x    RN x    Care Manager                    Consultant:  x    _______________________________________________________________________  Expected  Disposition:   Home with Family    HH/PT/OT/RN    SNF/LTC x   FREDERICK    ________________________________________________________________________  TOTAL TIME:  61  Minutes    Critical Care Provided     Minutes non procedure based      Comments    x Reviewed previous records   >50% of visit spent in counseling and coordination of care x Discussion with patient and/or family and questions answered       ________________________________________________________________________  Signed: Laverne Sharpe MD    Procedures: see electronic medical records for all procedures/Xrays and details which were not copied into this note but were reviewed prior to creation of Plan.     LAB DATA REVIEWED:    Recent Results (from the past 24 hour(s))   LIPASE    Collection Time: 06/08/19  1:51 PM   Result Value Ref Range    Lipase 51 (L) 73 - 432 U/L   METABOLIC PANEL, COMPREHENSIVE    Collection Time: 06/08/19  1:51 PM   Result Value Ref Range    Sodium 136 136 - 145 mmol/L    Potassium 3.8 3.5 - 5.1 mmol/L    Chloride 101 97 - 108 mmol/L    CO2 30 21 - 32 mmol/L Anion gap 5 5 - 15 mmol/L    Glucose 109 (H) 65 - 100 mg/dL    BUN 9 6 - 20 MG/DL    Creatinine 0.57 0.55 - 1.02 MG/DL    BUN/Creatinine ratio 16 12 - 20      GFR est AA >60 >60 ml/min/1.73m2    GFR est non-AA >60 >60 ml/min/1.73m2    Calcium 9.1 8.5 - 10.1 MG/DL    Bilirubin, total 0.6 0.2 - 1.0 MG/DL    ALT (SGPT) 20 12 - 78 U/L    AST (SGOT) 24 15 - 37 U/L    Alk. phosphatase 95 45 - 117 U/L    Protein, total 7.9 6.4 - 8.2 g/dL    Albumin 3.6 3.5 - 5.0 g/dL    Globulin 4.3 (H) 2.0 - 4.0 g/dL    A-G Ratio 0.8 (L) 1.1 - 2.2     D DIMER    Collection Time: 06/08/19  2:28 PM   Result Value Ref Range    D-dimer 1.17 (H) 0.00 - 0.65 mg/L FEU   CBC WITH AUTOMATED DIFF    Collection Time: 06/08/19  2:28 PM   Result Value Ref Range    WBC 10.3 3.6 - 11.0 K/uL    RBC 5.48 (H) 3.80 - 5.20 M/uL    HGB 16.6 (H) 11.5 - 16.0 g/dL    HCT 50.5 (H) 35.0 - 47.0 %    MCV 92.2 80.0 - 99.0 FL    MCH 30.3 26.0 - 34.0 PG    MCHC 32.9 30.0 - 36.5 g/dL    RDW 17.3 (H) 11.5 - 14.5 %    PLATELET 566 897 - 789 K/uL    MPV 10.0 8.9 - 12.9 FL    NRBC 0.0 0  WBC    ABSOLUTE NRBC 0.00 0.00 - 0.01 K/uL    NEUTROPHILS 79 (H) 32 - 75 %    LYMPHOCYTES 10 (L) 12 - 49 %    MONOCYTES 7 5 - 13 %    EOSINOPHILS 1 0 - 7 %    BASOPHILS 1 0 - 1 %    IMMATURE GRANULOCYTES 2 (H) 0.0 - 0.5 %    ABS. NEUTROPHILS 8.2 (H) 1.8 - 8.0 K/UL    ABS. LYMPHOCYTES 1.0 0.8 - 3.5 K/UL    ABS. MONOCYTES 0.7 0.0 - 1.0 K/UL    ABS. EOSINOPHILS 0.1 0.0 - 0.4 K/UL    ABS. BASOPHILS 0.1 0.0 - 0.1 K/UL    ABS. IMM.  GRANS. 0.2 (H) 0.00 - 0.04 K/UL    DF AUTOMATED     URINALYSIS W/ RFLX MICROSCOPIC    Collection Time: 06/08/19  3:10 PM   Result Value Ref Range    Color YELLOW/STRAW      Appearance CLOUDY (A) CLEAR      Specific gravity 1.010 1.003 - 1.030      pH (UA) 8.5 (H) 5.0 - 8.0      Protein 100 (A) NEG mg/dL    Glucose NEGATIVE  NEG mg/dL    Ketone NEGATIVE  NEG mg/dL    Bilirubin NEGATIVE  NEG      Blood NEGATIVE  NEG      Urobilinogen 0.2 0.2 - 1.0 EU/dL Nitrites NEGATIVE  NEG      Leukocyte Esterase TRACE (A) NEG      WBC 0-4 0 - 4 /hpf    RBC 0-5 0 - 5 /hpf    Epithelial cells FEW FEW /lpf    Bacteria 1+ (A) NEG /hpf    Hyaline cast 0-2 0 - 5 /lpf   LACTIC ACID    Collection Time: 06/08/19  7:10 PM   Result Value Ref Range    Lactic acid 0.9 0.4 - 2.0 MMOL/L

## 2019-06-08 NOTE — ED PROVIDER NOTES
EMERGENCY DEPARTMENT HISTORY AND PHYSICAL EXAM      Date: 6/8/2019  Patient Name: Aysha Ann    History of Presenting Illness     Chief Complaint   Patient presents with   Orma Damme Fall     Presents to the ED via EMS with c/o frequent falls, the most recent was last week and she is unsure what caused her to fall. She c/o lower back pain. She has been unable to ambulate and has not been able to take care of herself-she lives alone.  Back Pain       History Provided By: Patient and Patient's Son    HPI: Aysha Ann, 80 y.o. female with PMHx significant for arrhythmia, CAD, and hypertension, presents  to the ED with cc of lower back pain. The patient states that she fell earlier this week and has had back pain off and on since then. Her pain is located in the right flank and midline. It is currently an 8 out of 10 in severity. She denies incontinence, dysuria or diarrhea. She denies numbness or tingling. She denies shortness of breath or chest pain. According to her son, she complained of abdominal discomfort earlier today. She denies abdominal discomfort currently, but does states she cannot remember the last time she had a bowel movement. She denies fever or chills. She denies lightheadedness or dizziness. She also denies headache and neck pain. EMS noticed that she had erythema on both of her calves. Patient states she is always had this. She denies leg pain. There are no other complaints, changes, or physical findings at this time. PCP: Paul Bell MD    No current facility-administered medications on file prior to encounter. Current Outpatient Medications on File Prior to Encounter   Medication Sig Dispense Refill    captopril (CAPOTEN) 100 mg tablet Take 100 mg by mouth three (3) times daily.  LISINOPRIL PO Take  by mouth.  diazePAM (VALIUM) 2 mg tablet Take  by mouth every six (6) hours as needed for Anxiety.       aspirin 81 mg chewable tablet Take 81 mg by mouth daily.  hydroCHLOROthiazide (HYDRODIURIL) 25 mg tablet Take 25 mg by mouth daily.  metoprolol tartrate (LOPRESSOR) 50 mg tablet Take 50 mg by mouth every morning.  multivitamin (ONE A DAY) tablet Take 1 Tab by mouth daily.  amLODIPine (NORVASC) 5 mg tablet Take 5 mg by mouth daily.  simvastatin (ZOCOR) 10 mg tablet Take 10 mg by mouth daily.  cyanocobalamin (VITAMIN B12) 1,000 mcg/mL injection 1,000 mcg by IntraMUSCular route every thirty (30) days.  metoprolol tartrate (LOPRESSOR) 50 mg tablet Take 25 mg by mouth every evening.  acetaminophen (TYLENOL) 325 mg tablet Take 325 mg by mouth every six (6) hours as needed for Pain.  nitroglycerin (NITROSTAT) 0.4 mg SL tablet 0.4 mg by SubLINGual route every five (5) minutes as needed for Chest Pain. Up to 3 doses.  nitroglycerin (NITRODUR) 0.2 mg/hr 1 Patch by TransDERmal route daily.  trazodone (DESYREL) 50 mg tablet Take 50 mg by mouth nightly.  Fish Oil-Omega-3 Fatty Acids (FISH OIL) 360-1,200 mg Cap Take 1 Cap by mouth two (2) times a day. Past History     Past Medical History:  Past Medical History:   Diagnosis Date    Angina     has had mi    Arrhythmia     Cancer ([facility] [address]     basal cell removed form back    Chronic pain     fibromyalgia    Hypertension     Insomnia     Thyroid disease        Past Surgical History:  Past Surgical History:   Procedure Laterality Date    HX CHOLECYSTECTOMY      HX CORONARY STENT PLACEMENT      HX OTHER SURGICAL      basal cell removed from back       Family History:  History reviewed. No pertinent family history. Social History:  Social History     Tobacco Use    Smoking status: Never Smoker    Smokeless tobacco: Never Used   Substance Use Topics    Alcohol use: No    Drug use: No       Allergies:   Allergies   Allergen Reactions    Ceftriaxone Hives    Benadryl [Diphenhydramine Hcl] Hives and Rash     Patient's son says she's not allergic    Celebrex [Celecoxib] Rash    Ciprofloxacin Rash    Ivp [Fd And C Blue No.1] Rash and Swelling    Macrobid [Nitrofurantoin Monohyd/M-Cryst] Swelling    Niaspan [Niacin] Palpitations    Pcn [Penicillins] Rash and Swelling    Seafood [Shellfish Containing Products] Rash and Swelling         Review of Systems   Review of Systems   Constitutional: Negative for chills and fever. HENT: Negative for congestion. Eyes: Negative. Respiratory: Negative for cough and shortness of breath. Cardiovascular: Negative for chest pain. Gastrointestinal: Positive for abdominal pain. Negative for nausea. Endocrine: Negative for heat intolerance. Genitourinary: Positive for flank pain. Musculoskeletal: Positive for back pain. Negative for neck pain. Skin:         erythema  bilateral calves   Allergic/Immunologic: Negative for immunocompromised state. Neurological: Negative for light-headedness. Hematological: Does not bruise/bleed easily. Psychiatric/Behavioral: Negative. All other systems reviewed and are negative. Physical Exam   Physical Exam   Constitutional: She appears well-developed and well-nourished. She appears distressed. HENT:   Head: Normocephalic. Eyes: Pupils are equal, round, and reactive to light. EOM are normal.   Neck: Normal range of motion. Neck supple. No cervical tenderness   Cardiovascular: Normal rate, regular rhythm, normal heart sounds and intact distal pulses. Pulmonary/Chest: Effort normal and breath sounds normal. No respiratory distress. Abdominal: Soft. Bowel sounds are normal. She exhibits distension. There is tenderness. left greater than right abdominal tenderness, probable ventral hernia; right lower posterior rib tenderness, right flank tenderness and lumbar tenderness   Musculoskeletal: Normal range of motion. She exhibits edema and tenderness. Bilateral calf tenderness   Neurological: She is alert. Skin: Skin is warm and dry. There is erythema. Bilateral calf erythema anteriorly, bilateral calf tenderness and increased warmth   Psychiatric: She has a normal mood and affect. Her behavior is normal.   Nursing note and vitals reviewed. Diagnostic Study Results     Labs -     Recent Results (from the past 12 hour(s))   LIPASE    Collection Time: 06/08/19  1:51 PM   Result Value Ref Range    Lipase 51 (L) 73 - 677 U/L   METABOLIC PANEL, COMPREHENSIVE    Collection Time: 06/08/19  1:51 PM   Result Value Ref Range    Sodium 136 136 - 145 mmol/L    Potassium 3.8 3.5 - 5.1 mmol/L    Chloride 101 97 - 108 mmol/L    CO2 30 21 - 32 mmol/L    Anion gap 5 5 - 15 mmol/L    Glucose 109 (H) 65 - 100 mg/dL    BUN 9 6 - 20 MG/DL    Creatinine 0.57 0.55 - 1.02 MG/DL    BUN/Creatinine ratio 16 12 - 20      GFR est AA >60 >60 ml/min/1.73m2    GFR est non-AA >60 >60 ml/min/1.73m2    Calcium 9.1 8.5 - 10.1 MG/DL    Bilirubin, total 0.6 0.2 - 1.0 MG/DL    ALT (SGPT) 20 12 - 78 U/L    AST (SGOT) 24 15 - 37 U/L    Alk. phosphatase 95 45 - 117 U/L    Protein, total 7.9 6.4 - 8.2 g/dL    Albumin 3.6 3.5 - 5.0 g/dL    Globulin 4.3 (H) 2.0 - 4.0 g/dL    A-G Ratio 0.8 (L) 1.1 - 2.2     D DIMER    Collection Time: 06/08/19  2:28 PM   Result Value Ref Range    D-dimer 1.17 (H) 0.00 - 0.65 mg/L FEU   CBC WITH AUTOMATED DIFF    Collection Time: 06/08/19  2:28 PM   Result Value Ref Range    WBC 10.3 3.6 - 11.0 K/uL    RBC 5.48 (H) 3.80 - 5.20 M/uL    HGB 16.6 (H) 11.5 - 16.0 g/dL    HCT 50.5 (H) 35.0 - 47.0 %    MCV 92.2 80.0 - 99.0 FL    MCH 30.3 26.0 - 34.0 PG    MCHC 32.9 30.0 - 36.5 g/dL    RDW 17.3 (H) 11.5 - 14.5 %    PLATELET 355 673 - 140 K/uL    MPV 10.0 8.9 - 12.9 FL    NRBC 0.0 0  WBC    ABSOLUTE NRBC 0.00 0.00 - 0.01 K/uL    NEUTROPHILS 79 (H) 32 - 75 %    LYMPHOCYTES 10 (L) 12 - 49 %    MONOCYTES 7 5 - 13 %    EOSINOPHILS 1 0 - 7 %    BASOPHILS 1 0 - 1 %    IMMATURE GRANULOCYTES 2 (H) 0.0 - 0.5 %    ABS. NEUTROPHILS 8.2 (H) 1.8 - 8.0 K/UL    ABS.  LYMPHOCYTES 1.0 0.8 - 3.5 K/UL    ABS. MONOCYTES 0.7 0.0 - 1.0 K/UL    ABS. EOSINOPHILS 0.1 0.0 - 0.4 K/UL    ABS. BASOPHILS 0.1 0.0 - 0.1 K/UL    ABS. IMM. GRANS. 0.2 (H) 0.00 - 0.04 K/UL    DF AUTOMATED     URINALYSIS W/ RFLX MICROSCOPIC    Collection Time: 06/08/19  3:10 PM   Result Value Ref Range    Color YELLOW/STRAW      Appearance CLOUDY (A) CLEAR      Specific gravity 1.010 1.003 - 1.030      pH (UA) 8.5 (H) 5.0 - 8.0      Protein 100 (A) NEG mg/dL    Glucose NEGATIVE  NEG mg/dL    Ketone NEGATIVE  NEG mg/dL    Bilirubin NEGATIVE  NEG      Blood NEGATIVE  NEG      Urobilinogen 0.2 0.2 - 1.0 EU/dL    Nitrites NEGATIVE  NEG      Leukocyte Esterase TRACE (A) NEG      WBC 0-4 0 - 4 /hpf    RBC 0-5 0 - 5 /hpf    Epithelial cells FEW FEW /lpf    Bacteria 1+ (A) NEG /hpf    Hyaline cast 0-2 0 - 5 /lpf   LACTIC ACID    Collection Time: 06/08/19  7:10 PM   Result Value Ref Range    Lactic acid 0.9 0.4 - 2.0 MMOL/L       Radiologic Studies -   CT HEAD WO CONT   Final Result   IMPRESSION:      No acute process. XR ELBOW RT MIN 3 V   Final Result   IMPRESSION:   No acute fracture. XR RIBS RT W PA CXR MIN 3 V   Final Result   IMPRESSION:   1. Osteopenia. A displaced rib fracture is not identified   2. Diffuse interstitial changes most confluent at the left base         CT ABD PELV WO CONT   Final Result   IMPRESSION:      1. Osteopenia. Interval fractures of T10 and T11. The T11 fracture appears acute   2. Essentially unchanged splenic artery aneurysm   3. Otherwise no acute abnormality of the abdomen and pelvis      MRI LUMB SPINE WO CONT    (Results Pending)   MRI [facility] SPINE WO CONT    (Results Pending)   XR CHEST PORT    (Results Pending)     CT Results  (Last 48 hours)    None        CXR Results  (Last 48 hours)    None            Medical Decision Making   I am the first provider for this patient.     I reviewed the vital signs, available nursing notes, past medical history, past surgical history, family history and social history. Vital Signs-Reviewed the patient's vital signs. Patient Vitals for the past 12 hrs:   Temp Pulse Resp BP SpO2   06/08/19 1245    172/72 96 %   06/08/19 1202 97.8 °F (36.6 °C) 81 12 186/85 93 %       Pulse Oximetry Analysis - 93% on room air    Cardiac Monitor:   Rate: 81 bpm  Rhythm: Normal Sinus Rhythm        Records Reviewed: Nursing Notes, Old Medical Records, Ambulance Run Sheet, Previous Radiology Studies and Previous Laboratory Studies    Provider Notes (Medical Decision Making):   Fracture, sprain, contusion, kidney stone, UTI, cellulitis, DVT    ED Course:   Initial assessment performed. The patients presenting problems have been discussed, and they are in agreement with the care plan formulated and outlined with them. I have encouraged them to ask questions as they arise throughout their visit. Consult note: The patient is being admitted by Dr. Jw Jackson, hospitalist      Consult note:    Orthopedics is aware of the patient. Nurse practitioner Maria Whitt will see the patient in the morning. Critical Care Time:   0    Disposition:  admit    PLAN:  1. Current Discharge Medication List        2. Follow-up Information    None       Return to ED if worse     Diagnosis     Clinical Impression:   1. Pneumonia of left lower lobe due to infectious organism ([address]    2. Thoracic compression fracture, closed, initial encounter [facility])        Attestations:     This chart was completed by myself, Dr. Maggie Casey

## 2019-06-08 NOTE — ED NOTES
Bedside and Verbal shift change report given to Anette Horton RN (oncoming nurse) by Frankey Lipoma, RN (offgoing nurse). Report included the following information SBAR, ED Summary, MAR and Recent Results.

## 2019-06-08 NOTE — ED NOTES
Pt to ED with family via EMS for complaints of frequent falls and back pain. Pt had a fall \"several days ago\" and has recently began c/o mid to upper back pain to the right side. Pt does have no recollection of the fall. Pt also has redness to bilateral lower legs. PVS intact. Resp even and unlabored.

## 2019-06-09 ENCOUNTER — APPOINTMENT (OUTPATIENT)
Dept: MRI IMAGING | Age: 84
DRG: 515 | End: 2019-06-09
Attending: INTERNAL MEDICINE
Payer: MEDICARE

## 2019-06-09 LAB
ALBUMIN SERPL-MCNC: 3.3 G/DL (ref 3.5–5)
ALBUMIN/GLOB SERPL: 0.8 {RATIO} (ref 1.1–2.2)
ALP SERPL-CCNC: 98 U/L (ref 45–117)
ALT SERPL-CCNC: 17 U/L (ref 12–78)
ANION GAP SERPL CALC-SCNC: 5 MMOL/L (ref 5–15)
AST SERPL-CCNC: 19 U/L (ref 15–37)
BASOPHILS # BLD: 0.1 K/UL (ref 0–0.1)
BASOPHILS NFR BLD: 1 % (ref 0–1)
BILIRUB SERPL-MCNC: 0.9 MG/DL (ref 0.2–1)
BUN SERPL-MCNC: 9 MG/DL (ref 6–20)
BUN/CREAT SERPL: 14 (ref 12–20)
CALCIUM SERPL-MCNC: 8.6 MG/DL (ref 8.5–10.1)
CHLORIDE SERPL-SCNC: 101 MMOL/L (ref 97–108)
CO2 SERPL-SCNC: 27 MMOL/L (ref 21–32)
CREAT SERPL-MCNC: 0.63 MG/DL (ref 0.55–1.02)
DIFFERENTIAL METHOD BLD: ABNORMAL
EOSINOPHIL # BLD: 0 K/UL (ref 0–0.4)
EOSINOPHIL NFR BLD: 0 % (ref 0–7)
ERYTHROCYTE [DISTWIDTH] IN BLOOD BY AUTOMATED COUNT: 17.8 % (ref 11.5–14.5)
GLOBULIN SER CALC-MCNC: 4.2 G/DL (ref 2–4)
GLUCOSE BLD STRIP.AUTO-MCNC: 122 MG/DL (ref 65–100)
GLUCOSE SERPL-MCNC: 120 MG/DL (ref 65–100)
HCT VFR BLD AUTO: 50.9 % (ref 35–47)
HGB BLD-MCNC: 17.1 G/DL (ref 11.5–16)
IMM GRANULOCYTES # BLD AUTO: 0.1 K/UL (ref 0–0.04)
IMM GRANULOCYTES NFR BLD AUTO: 1 % (ref 0–0.5)
LYMPHOCYTES # BLD: 1.2 K/UL (ref 0.8–3.5)
LYMPHOCYTES NFR BLD: 11 % (ref 12–49)
MAGNESIUM SERPL-MCNC: 1.9 MG/DL (ref 1.6–2.4)
MCH RBC QN AUTO: 30.6 PG (ref 26–34)
MCHC RBC AUTO-ENTMCNC: 33.6 G/DL (ref 30–36.5)
MCV RBC AUTO: 91.1 FL (ref 80–99)
MONOCYTES # BLD: 0.7 K/UL (ref 0–1)
MONOCYTES NFR BLD: 7 % (ref 5–13)
NEUTS SEG # BLD: 8.7 K/UL (ref 1.8–8)
NEUTS SEG NFR BLD: 80 % (ref 32–75)
NRBC # BLD: 0 K/UL (ref 0–0.01)
NRBC BLD-RTO: 0 PER 100 WBC
PLATELET # BLD AUTO: 300 K/UL (ref 150–400)
PMV BLD AUTO: 9.9 FL (ref 8.9–12.9)
POTASSIUM SERPL-SCNC: 3.7 MMOL/L (ref 3.5–5.1)
PROCALCITONIN SERPL-MCNC: 0.1 NG/ML
PROT SERPL-MCNC: 7.5 G/DL (ref 6.4–8.2)
RBC # BLD AUTO: 5.59 M/UL (ref 3.8–5.2)
SERVICE CMNT-IMP: ABNORMAL
SODIUM SERPL-SCNC: 133 MMOL/L (ref 136–145)
TSH SERPL DL<=0.05 MIU/L-ACNC: 1.84 UIU/ML (ref 0.36–3.74)
VIT B12 SERPL-MCNC: 517 PG/ML (ref 193–986)
WBC # BLD AUTO: 10.8 K/UL (ref 3.6–11)

## 2019-06-09 PROCEDURE — 80053 COMPREHEN METABOLIC PANEL: CPT

## 2019-06-09 PROCEDURE — 74011250636 HC RX REV CODE- 250/636: Performed by: INTERNAL MEDICINE

## 2019-06-09 PROCEDURE — 77030038269 HC DRN EXT URIN PURWCK BARD -A

## 2019-06-09 PROCEDURE — 65660000000 HC RM CCU STEPDOWN

## 2019-06-09 PROCEDURE — 72148 MRI LUMBAR SPINE W/O DYE: CPT

## 2019-06-09 PROCEDURE — 84443 ASSAY THYROID STIM HORMONE: CPT

## 2019-06-09 PROCEDURE — 83735 ASSAY OF MAGNESIUM: CPT

## 2019-06-09 PROCEDURE — 36415 COLL VENOUS BLD VENIPUNCTURE: CPT

## 2019-06-09 PROCEDURE — 74011000250 HC RX REV CODE- 250: Performed by: HOSPITALIST

## 2019-06-09 PROCEDURE — 82607 VITAMIN B-12: CPT

## 2019-06-09 PROCEDURE — 74011250637 HC RX REV CODE- 250/637: Performed by: INTERNAL MEDICINE

## 2019-06-09 PROCEDURE — 74011250637 HC RX REV CODE- 250/637: Performed by: HOSPITALIST

## 2019-06-09 PROCEDURE — 84145 PROCALCITONIN (PCT): CPT

## 2019-06-09 PROCEDURE — 72146 MRI CHEST SPINE W/O DYE: CPT

## 2019-06-09 PROCEDURE — 82962 GLUCOSE BLOOD TEST: CPT

## 2019-06-09 PROCEDURE — 94760 N-INVAS EAR/PLS OXIMETRY 1: CPT

## 2019-06-09 PROCEDURE — 85025 COMPLETE CBC W/AUTO DIFF WBC: CPT

## 2019-06-09 RX ORDER — HYDROCORTISONE 1 %
CREAM (GRAM) TOPICAL 2 TIMES DAILY
Status: DISCONTINUED | OUTPATIENT
Start: 2019-06-09 | End: 2019-06-13 | Stop reason: HOSPADM

## 2019-06-09 RX ORDER — LIDOCAINE 4 G/100G
1 PATCH TOPICAL EVERY 24 HOURS
Status: DISCONTINUED | OUTPATIENT
Start: 2019-06-09 | End: 2019-06-13 | Stop reason: HOSPADM

## 2019-06-09 RX ADMIN — METOPROLOL TARTRATE 50 MG: 50 TABLET ORAL at 08:39

## 2019-06-09 RX ADMIN — METOPROLOL TARTRATE 25 MG: 25 TABLET ORAL at 17:47

## 2019-06-09 RX ADMIN — LISINOPRIL 10 MG: 5 TABLET ORAL at 08:37

## 2019-06-09 RX ADMIN — Medication: at 19:26

## 2019-06-09 RX ADMIN — HEPARIN SODIUM 5000 UNITS: 5000 INJECTION INTRAVENOUS; SUBCUTANEOUS at 20:36

## 2019-06-09 RX ADMIN — HEPARIN SODIUM 5000 UNITS: 5000 INJECTION INTRAVENOUS; SUBCUTANEOUS at 08:38

## 2019-06-09 RX ADMIN — ASPIRIN 81 MG 81 MG: 81 TABLET ORAL at 08:38

## 2019-06-09 RX ADMIN — POLYETHYLENE GLYCOL 3350 17 G: 17 POWDER, FOR SOLUTION ORAL at 08:38

## 2019-06-09 RX ADMIN — PRAVASTATIN SODIUM 20 MG: 40 TABLET ORAL at 08:38

## 2019-06-09 RX ADMIN — HYDROCODONE BITARTRATE AND ACETAMINOPHEN 1 TABLET: 5; 325 TABLET ORAL at 15:54

## 2019-06-09 RX ADMIN — HYDROCHLOROTHIAZIDE 25 MG: 25 TABLET ORAL at 08:38

## 2019-06-09 RX ADMIN — OMEGA-3 FATTY ACIDS CAP 1000 MG 1 CAPSULE: 1000 CAP at 08:38

## 2019-06-09 RX ADMIN — OMEGA-3 FATTY ACIDS CAP 1000 MG 1 CAPSULE: 1000 CAP at 17:47

## 2019-06-09 RX ADMIN — HYDROCODONE BITARTRATE AND ACETAMINOPHEN 1 TABLET: 5; 325 TABLET ORAL at 20:36

## 2019-06-09 RX ADMIN — AMLODIPINE BESYLATE 5 MG: 5 TABLET ORAL at 08:38

## 2019-06-09 RX ADMIN — TRAZODONE HYDROCHLORIDE 50 MG: 50 TABLET ORAL at 23:23

## 2019-06-09 RX ADMIN — Medication 10 ML: at 14:18

## 2019-06-09 NOTE — PROGRESS NOTES
Advance Care Planning Note      NAME: Tammi Cisneros   :  1927   MRN:  [id number]     Date/Time:  2019 8:24 PM    Active Diagnoses:  Hospital Problems  Date Reviewed: 2016          Codes Class Noted POA    CAP (community acquired pneumonia) ICD-10-CM: J18.9  ICD-9-CM: 603  2019 Unknown              These active diagnoses are of sufficient risk that focused discussion on advance care planning is indicated in order to allow the patient to thoughtfully consider personal goals of care, and if situations arise that prevent the ability to personally give input, to ensure appropriate representation of their personal desires for different levels and aggressiveness of care. Discussion:   Code status addressed and wants to be a Full Code   Patient wants central line and vasopressors if needed. Patient would also want a feeding tube, if needed, for nutritional support. Patient  would like to assign her son   as the surrogate decision maker. Persons present and participating in discussion: Tammi Cisneros, Rehana Mackey MD, and son       Time Spent:   Total time spent face-to-face in education and discussion:   15  minutes.          Rehana Mackey MD   Hospitalist

## 2019-06-09 NOTE — PROGRESS NOTES

## 2019-06-09 NOTE — PROGRESS NOTES
Hospitalist Progress Note    NAME: Denise Gamez   :  1927   MRN:  [id number]       Assessment / Plan:  Osteoporotic Compression fracture causing inability to ambulate   Intractable pain   Ct  1. Osteopenia. Interval fractures of T10 and T11. The T11 fracture appears acute  2. Essentially unchanged splenic artery aneurysm  3. Otherwise no acute abnormality of the abdomen and pelvis  xrays  Osteopenia. A displaced rib fracture is not identified  2. Diffuse interstitial changes most confluent at the left base  --MRI lumbar and thoracic   -PT/OT /fall precautions   Ortho to see  Add lidocaine patch, c/w current pain meds. CT head negative  CAP ? C/w doxycyline. Follow cultures.     Rt elbow pain xrays negative for fracture  Bilateral  calf pain r/o dvt by doppler      15 mm splenic artery aneurysm unchanged  -by CT, will ask vascular to review      HTN/ CAD ( h/o MI with stents )   -BP stable  -pharmacy consulted for med rec  -cont norvasc/ metoprolol/lisinopril unsure of dose start low dose  Iv hydralazine prn     Fibromyalgia,trazodone for sleep   HLP, cont zocor         Pharmacy consulted for med rec         Code Status: Full code d/w pt and son at bedside but would not want to remain in vegetable state   Surrogate Decision Maker: son      DVT Prophylaxis:  Heparin   GI Prophylaxis: not indicated     Baseline: lives alone; ambulating with cane/walker  has 1 son has aid          25.0 - 29.9 Overweight / Body mass index is 25.04 kg/m². Recommended Disposition:  PT, OT, RN     Subjective:     Chief Complaint / Reason for Physician Visit  \"Complains of severe back pain\". Discussed with RN events overnight.      Review of Systems:  Symptom Y/N Comments  Symptom Y/N Comments   Fever/Chills    Chest Pain     Poor Appetite    Edema     Cough    Abdominal Pain     Sputum    Joint Pain     SOB/REBOLLAR    Pruritis/Rash     Nausea/vomit    Tolerating PT/OT     Diarrhea    Tolerating Diet     Constipation Other       Could NOT obtain due to:      Objective:     VITALS:   Last 24hrs VS reviewed since prior progress note. Most recent are:  Patient Vitals for the past 24 hrs:   Temp Pulse Resp BP SpO2   06/09/19 0732 97.9 °F (36.6 °C) 78 18 183/78 95 %   06/09/19 0355 98.3 °F (36.8 °C) 84 16 152/68 93 %   06/09/19 0011 97.8 °F (36.6 °C) 72 16 120/65 95 %   06/08/19 2142 98.9 °F (37.2 °C) 99 16 164/86 96 %   06/08/19 1830    134/70 98 %   06/08/19 1824     97 %   06/08/19 1821    146/62    06/08/19 1716    179/71 92 %   06/08/19 1715     92 %   06/08/19 1630     96 %   06/08/19 1603    169/80 90 %   06/08/19 1415    175/75 94 %   06/08/19 1245    172/72 96 %   06/08/19 1202 97.8 °F (36.6 °C) 81 12 186/85 93 %     No intake or output data in the 24 hours ending 06/09/19 0843     PHYSICAL EXAM:  General: WD, WN. Alert, cooperative, no acute distress    EENT:  EOMI. Anicteric sclerae. MMM  Resp:  CTA bilaterally, no wheezing or rales. No accessory muscle use  CV:  Regular  rhythm,  No edema  GI:  Soft, Non distended, Non tender.    Neurologic:  Alert and oriented X 3, normal speech,   Psych:   Good insight. Not anxious nor agitated  Skin:  No rashes. No jaundice    Reviewed most current lab test results and cultures  YES  Reviewed most current radiology test results   YES  Review and summation of old records today    NO  Reviewed patient's current orders and MAR    YES  PMH/SH reviewed - no change compared to H&P  ________________________________________________________________________  Care Plan discussed with:    Comments   Patient x    Family      RN x    Care Manager     Consultant                        Multidiciplinary team rounds were held today with , nursing, pharmacist and clinical coordinator. Patient's plan of care was discussed; medications were reviewed and discharge planning was addressed. ________________________________________________________________________    ________________________________________________________________________  Gonzalo Villasenor MD     Procedures: see electronic medical records for all procedures/Xrays and details which were not copied into this note but were reviewed prior to creation of Plan. LABS:  I reviewed today's most current labs and imaging studies.   Pertinent labs include:  Recent Labs     06/09/19  0359 06/08/19  1428   WBC 10.8 10.3   HGB 17.1* 16.6*   HCT 50.9* 50.5*    301     Recent Labs     06/09/19  0359 06/08/19  1351   * 136   K 3.7 3.8    101   CO2 27 30   * 109*   BUN 9 9   CREA 0.63 0.57   CA 8.6 9.1   MG 1.9  --    ALB 3.3* 3.6   TBILI 0.9 0.6   SGOT 19 24   ALT 17 20       Signed: Gonzalo Villasenor MD

## 2019-06-09 NOTE — CONSULTS
SPINE CONSULT NOTE    Subjective:     Date of Consultation:  June 9, 2019      Jeremie Gonzalez is a 80 y.o. female who is being seen for back pain s/p GLF earlier this week. Work up has reveled a T10/11 compression fracture. Pt denies leg pain/numbness/tingling. Pt has hx of kyphoplasty last year and states she does not wish to have another kyphoplasty     Patient Active Problem List    Diagnosis Date Noted    CAP (community acquired pneumonia) 06/08/2019    Compression fracture of L4 lumbar vertebra 10/29/2018    Closed left hip fracture ([facility] [address] 02/04/2016    CAD (coronary artery disease) 02/04/2016    HTN (hypertension) 02/04/2016     History reviewed. No pertinent family history. Social History     Tobacco Use    Smoking status: Never Smoker    Smokeless tobacco: Never Used   Substance Use Topics    Alcohol use: No     Past Medical History:   Diagnosis Date    Angina     has had mi    Arrhythmia     Cancer ([facility] [address]     basal cell removed form back    Chronic pain     fibromyalgia    Hypertension     Insomnia     Thyroid disease       Past Surgical History:   Procedure Laterality Date    HX CHOLECYSTECTOMY      HX CORONARY STENT PLACEMENT      HX OTHER SURGICAL      basal cell removed from back      Prior to Admission medications    Medication Sig Start Date End Date Taking? Authorizing Provider   captopril (CAPOTEN) 100 mg tablet Take 100 mg by mouth three (3) times daily. Yes Meenu, MD Luana   LISINOPRIL PO Take  by mouth. Yes Other, MD Luana   diazePAM (VALIUM) 2 mg tablet Take  by mouth every six (6) hours as needed for Anxiety. Yes Other, MD Luana   aspirin 81 mg chewable tablet Take 81 mg by mouth daily. Yes Other, MD Luana   hydroCHLOROthiazide (HYDRODIURIL) 25 mg tablet Take 25 mg by mouth daily. Yes Provider, Historical   metoprolol tartrate (LOPRESSOR) 50 mg tablet Take 50 mg by mouth every morning.    Yes Provider, Historical   multivitamin (ONE A DAY) tablet Take 1 Tab by mouth daily. Yes Provider, Historical   amLODIPine (NORVASC) 5 mg tablet Take 5 mg by mouth daily. Yes Provider, Historical   simvastatin (ZOCOR) 10 mg tablet Take 10 mg by mouth daily. Yes Provider, Historical   cyanocobalamin (VITAMIN B12) 1,000 mcg/mL injection 1,000 mcg by IntraMUSCular route every thirty (30) days. Provider, Historical   metoprolol tartrate (LOPRESSOR) 50 mg tablet Take 25 mg by mouth every evening. Provider, Historical   acetaminophen (TYLENOL) 325 mg tablet Take 325 mg by mouth every six (6) hours as needed for Pain. Provider, Historical   nitroglycerin (NITROSTAT) 0.4 mg SL tablet 0.4 mg by SubLINGual route every five (5) minutes as needed for Chest Pain. Up to 3 doses. Provider, Historical   nitroglycerin (NITRODUR) 0.2 mg/hr 1 Patch by TransDERmal route daily. Other, MD Luana   trazodone (DESYREL) 50 mg tablet Take 50 mg by mouth nightly. Other, MD Luana   Fish Oil-Omega-3 Fatty Acids (FISH OIL) 360-1,200 mg Cap Take 1 Cap by mouth two (2) times a day.  12/2/10   Provider, Historical     Current Facility-Administered Medications   Medication Dose Route Frequency    lidocaine 4 % patch 1 Patch  1 Patch TransDERmal Q24H    sodium chloride (NS) flush 5-40 mL  5-40 mL IntraVENous Q8H    sodium chloride (NS) flush 5-40 mL  5-40 mL IntraVENous PRN    0.9% sodium chloride infusion  75 mL/hr IntraVENous CONTINUOUS    heparin (porcine) injection 5,000 Units  5,000 Units SubCUTAneous Q12H    acetaminophen (TYLENOL) tablet 650 mg  650 mg Oral Q4H PRN    HYDROcodone-acetaminophen (NORCO) 5-325 mg per tablet 1 Tab  1 Tab Oral Q4H PRN    morphine injection 1 mg  1 mg IntraVENous Q4H PRN    ondansetron (ZOFRAN) injection 4 mg  4 mg IntraVENous Q6H PRN    albuterol-ipratropium (DUO-NEB) 2.5 MG-0.5 MG/3 ML  3 mL Nebulization Q4H PRN    amLODIPine (NORVASC) tablet 5 mg  5 mg Oral DAILY    aspirin chewable tablet 81 mg  81 mg Oral DAILY    omega 3-DHA-EPA-fish oil 1,000 mg (120 mg-180 mg) capsule 1 Cap  1 Cap Oral BID    hydroCHLOROthiazide (HYDRODIURIL) tablet 25 mg  25 mg Oral DAILY    metoprolol tartrate (LOPRESSOR) tablet 50 mg  50 mg Oral 7am    metoprolol tartrate (LOPRESSOR) tablet 25 mg  25 mg Oral QPM    multivitamin, tx-iron-ca-min (THERA-M w/ IRON) tablet 1 Tab  1 Tab Oral DAILY    pravastatin (PRAVACHOL) tablet 20 mg  20 mg Oral DAILY    traZODone (DESYREL) tablet 50 mg  50 mg Oral QHS    hydrALAZINE (APRESOLINE) 20 mg/mL injection 10 mg  10 mg IntraVENous Q6H PRN    lisinopril (PRINIVIL, ZESTRIL) tablet 10 mg  10 mg Oral DAILY    polyethylene glycol (MIRALAX) packet 17 g  17 g Oral DAILY      Allergies   Allergen Reactions    Ceftriaxone Hives    Benadryl [Diphenhydramine Hcl] Hives and Rash     Patient's son says she's not allergic    Celebrex [Celecoxib] Rash    Ciprofloxacin Rash    Ivp [Fd And C Blue No.1] Rash and Swelling    Macrobid [Nitrofurantoin Monohyd/M-Cryst] Swelling    Niaspan [Niacin] Palpitations    Pcn [Penicillins] Rash and Swelling    Seafood [Shellfish Containing Products] Rash and Swelling        Review of Systems:  A comprehensive review of systems was negative except for that written in the HPI. Mental Status: no dementia    Objective:     Patient Vitals for the past 8 hrs:   BP Temp Pulse Resp SpO2   19 0732 183/78 97.9 °F (36.6 °C) 78 18 95 %   19 0355 152/68 98.3 °F (36.8 °C) 84 16 93 %     Temp (24hrs), Av.1 °F (36.7 °C), Min:97.8 °F (36.6 °C), Max:98.9 °F (37.2 °C)      Gen: Well-developed,  in no acute distress   Musc: + ROM of UE/LE, NVI without focal weakness, +TTP of T spine   Skin: No skin breakdown noted.  Skin warm, pink, dry  Neuro: Cranial nerves are grossly intact, no focal motor weakness, follows commands appropriately   Psych: Good insight, oriented to person, place and time, alert    Imaging Review: MRI pending     Labs:   Recent Results (from the past 24 hour(s))   LIPASE    Collection Time: 06/08/19  1:51 PM   Result Value Ref Range    Lipase 51 (L) 73 - 631 U/L   METABOLIC PANEL, COMPREHENSIVE    Collection Time: 06/08/19  1:51 PM   Result Value Ref Range    Sodium 136 136 - 145 mmol/L    Potassium 3.8 3.5 - 5.1 mmol/L    Chloride 101 97 - 108 mmol/L    CO2 30 21 - 32 mmol/L    Anion gap 5 5 - 15 mmol/L    Glucose 109 (H) 65 - 100 mg/dL    BUN 9 6 - 20 MG/DL    Creatinine 0.57 0.55 - 1.02 MG/DL    BUN/Creatinine ratio 16 12 - 20      GFR est AA >60 >60 ml/min/1.73m2    GFR est non-AA >60 >60 ml/min/1.73m2    Calcium 9.1 8.5 - 10.1 MG/DL    Bilirubin, total 0.6 0.2 - 1.0 MG/DL    ALT (SGPT) 20 12 - 78 U/L    AST (SGOT) 24 15 - 37 U/L    Alk. phosphatase 95 45 - 117 U/L    Protein, total 7.9 6.4 - 8.2 g/dL    Albumin 3.6 3.5 - 5.0 g/dL    Globulin 4.3 (H) 2.0 - 4.0 g/dL    A-G Ratio 0.8 (L) 1.1 - 2.2     D DIMER    Collection Time: 06/08/19  2:28 PM   Result Value Ref Range    D-dimer 1.17 (H) 0.00 - 0.65 mg/L FEU   CBC WITH AUTOMATED DIFF    Collection Time: 06/08/19  2:28 PM   Result Value Ref Range    WBC 10.3 3.6 - 11.0 K/uL    RBC 5.48 (H) 3.80 - 5.20 M/uL    HGB 16.6 (H) 11.5 - 16.0 g/dL    HCT 50.5 (H) 35.0 - 47.0 %    MCV 92.2 80.0 - 99.0 FL    MCH 30.3 26.0 - 34.0 PG    MCHC 32.9 30.0 - 36.5 g/dL    RDW 17.3 (H) 11.5 - 14.5 %    PLATELET 374 718 - 005 K/uL    MPV 10.0 8.9 - 12.9 FL    NRBC 0.0 0  WBC    ABSOLUTE NRBC 0.00 0.00 - 0.01 K/uL    NEUTROPHILS 79 (H) 32 - 75 %    LYMPHOCYTES 10 (L) 12 - 49 %    MONOCYTES 7 5 - 13 %    EOSINOPHILS 1 0 - 7 %    BASOPHILS 1 0 - 1 %    IMMATURE GRANULOCYTES 2 (H) 0.0 - 0.5 %    ABS. NEUTROPHILS 8.2 (H) 1.8 - 8.0 K/UL    ABS. LYMPHOCYTES 1.0 0.8 - 3.5 K/UL    ABS. MONOCYTES 0.7 0.0 - 1.0 K/UL    ABS. EOSINOPHILS 0.1 0.0 - 0.4 K/UL    ABS. BASOPHILS 0.1 0.0 - 0.1 K/UL    ABS. IMM.  GRANS. 0.2 (H) 0.00 - 0.04 K/UL    DF AUTOMATED     URINALYSIS W/ RFLX MICROSCOPIC    Collection Time: 06/08/19  3:10 PM   Result Value Ref Range    Color YELLOW/STRAW Appearance CLOUDY (A) CLEAR      Specific gravity 1.010 1.003 - 1.030      pH (UA) 8.5 (H) 5.0 - 8.0      Protein 100 (A) NEG mg/dL    Glucose NEGATIVE  NEG mg/dL    Ketone NEGATIVE  NEG mg/dL    Bilirubin NEGATIVE  NEG      Blood NEGATIVE  NEG      Urobilinogen 0.2 0.2 - 1.0 EU/dL    Nitrites NEGATIVE  NEG      Leukocyte Esterase TRACE (A) NEG      WBC 0-4 0 - 4 /hpf    RBC 0-5 0 - 5 /hpf    Epithelial cells FEW FEW /lpf    Bacteria 1+ (A) NEG /hpf    Hyaline cast 0-2 0 - 5 /lpf   LACTIC ACID    Collection Time: 06/08/19  7:10 PM   Result Value Ref Range    Lactic acid 0.9 0.4 - 2.0 MMOL/L   CULTURE, BLOOD, PAIRED    Collection Time: 06/08/19  7:35 PM   Result Value Ref Range    Special Requests: NO SPECIAL REQUESTS      Culture result: NO GROWTH AFTER 13 HOURS     PROCALCITONIN    Collection Time: 06/09/19  3:59 AM   Result Value Ref Range    Procalcitonin 0.1 ng/mL   METABOLIC PANEL, COMPREHENSIVE    Collection Time: 06/09/19  3:59 AM   Result Value Ref Range    Sodium 133 (L) 136 - 145 mmol/L    Potassium 3.7 3.5 - 5.1 mmol/L    Chloride 101 97 - 108 mmol/L    CO2 27 21 - 32 mmol/L    Anion gap 5 5 - 15 mmol/L    Glucose 120 (H) 65 - 100 mg/dL    BUN 9 6 - 20 MG/DL    Creatinine 0.63 0.55 - 1.02 MG/DL    BUN/Creatinine ratio 14 12 - 20      GFR est AA >60 >60 ml/min/1.73m2    GFR est non-AA >60 >60 ml/min/1.73m2    Calcium 8.6 8.5 - 10.1 MG/DL    Bilirubin, total 0.9 0.2 - 1.0 MG/DL    ALT (SGPT) 17 12 - 78 U/L    AST (SGOT) 19 15 - 37 U/L    Alk.  phosphatase 98 45 - 117 U/L    Protein, total 7.5 6.4 - 8.2 g/dL    Albumin 3.3 (L) 3.5 - 5.0 g/dL    Globulin 4.2 (H) 2.0 - 4.0 g/dL    A-G Ratio 0.8 (L) 1.1 - 2.2     CBC WITH AUTOMATED DIFF    Collection Time: 06/09/19  3:59 AM   Result Value Ref Range    WBC 10.8 3.6 - 11.0 K/uL    RBC 5.59 (H) 3.80 - 5.20 M/uL    HGB 17.1 (H) 11.5 - 16.0 g/dL    HCT 50.9 (H) 35.0 - 47.0 %    MCV 91.1 80.0 - 99.0 FL    MCH 30.6 26.0 - 34.0 PG    MCHC 33.6 30.0 - 36.5 g/dL    RDW 17.8 (H) 11.5 - 14.5 %    PLATELET 275 741 - 405 K/uL    MPV 9.9 8.9 - 12.9 FL    NRBC 0.0 0  WBC    ABSOLUTE NRBC 0.00 0.00 - 0.01 K/uL    NEUTROPHILS 80 (H) 32 - 75 %    LYMPHOCYTES 11 (L) 12 - 49 %    MONOCYTES 7 5 - 13 %    EOSINOPHILS 0 0 - 7 %    BASOPHILS 1 0 - 1 %    IMMATURE GRANULOCYTES 1 (H) 0.0 - 0.5 %    ABS. NEUTROPHILS 8.7 (H) 1.8 - 8.0 K/UL    ABS. LYMPHOCYTES 1.2 0.8 - 3.5 K/UL    ABS. MONOCYTES 0.7 0.0 - 1.0 K/UL    ABS. EOSINOPHILS 0.0 0.0 - 0.4 K/UL    ABS. BASOPHILS 0.1 0.0 - 0.1 K/UL    ABS. IMM. GRANS. 0.1 (H) 0.00 - 0.04 K/UL    DF AUTOMATED     MAGNESIUM    Collection Time: 06/09/19  3:59 AM   Result Value Ref Range    Magnesium 1.9 1.6 - 2.4 mg/dL   TSH 3RD GENERATION    Collection Time: 06/09/19  3:59 AM   Result Value Ref Range    TSH 1.84 0.36 - 3.74 uIU/mL         Impression:     Patient Active Problem List    Diagnosis Date Noted    CAP (community acquired pneumonia) 06/08/2019    Compression fracture of L4 lumbar vertebra 10/29/2018    Closed left hip fracture ([facility] [address] 02/04/2016    CAD (coronary artery disease) 02/04/2016    HTN (hypertension) 02/04/2016     Active Problems:    CAP (community acquired pneumonia) (6/8/2019)        Plan:   -  Pt has just returned to room from MRI of T and L spine, pt states she does not want another kyphoplasty for these compression fractures  -  Recommend pain management, OOB as tolerated with back precautions   -  Follow up as needed       Dr. [place] aware and agrees with plan as above.         Karla Salmeron NP  Orthopedic Nurse Practitioner   [place]

## 2019-06-09 NOTE — ED NOTES
TRANSFER - OUT REPORT:    Verbal report given to Eula EPPERSON RN(name) on Porsha Ann  being transferred to [phone] (unit) for routine progression of care       Report consisted of patients Situation, Background, Assessment and   Recommendations(SBAR). Information from the following report(s) SBAR, ED Summary, [facility] and Recent Results was reviewed with the receiving nurse. Lines:   Peripheral IV 06/08/19 Left Antecubital (Active)   Site Assessment Clean, dry, & intact 6/8/2019  2:19 PM   Phlebitis Assessment 0 6/8/2019  2:19 PM   Infiltration Assessment 0 6/8/2019  2:19 PM   Dressing Status Clean, dry, & intact 6/8/2019  2:19 PM   Dressing Type Transparent 6/8/2019  2:19 PM   Hub Color/Line Status Pink 6/8/2019  2:19 PM        Opportunity for questions and clarification was provided. Patient transported with:   Son

## 2019-06-09 NOTE — PROGRESS NOTES
Bedside and Verbal shift change report given to Eula RN (oncoming nurse) by Uzair Myers RN (offgoing nurse). Report included the following information SBAR, Kardex and MAR.

## 2019-06-09 NOTE — PROGRESS NOTES
Problem: Falls - Risk of  Goal: *Absence of Falls  Description  Document Tanya Ny Fall Risk and appropriate interventions in the flowsheet. Outcome: Progressing Towards Goal     Problem: Patient Education: Go to Patient Education Activity  Goal: Patient/Family Education  Outcome: Progressing Towards Goal     Problem: Pressure Injury - Risk of  Goal: *Prevention of pressure injury  Description  Document Blade Scale and appropriate interventions in the flowsheet.   Outcome: Progressing Towards Goal     Problem: Patient Education: Go to Patient Education Activity  Goal: Patient/Family Education  Outcome: Progressing Towards Goal

## 2019-06-09 NOTE — PROGRESS NOTES

## 2019-06-10 PROCEDURE — 74011000250 HC RX REV CODE- 250: Performed by: HOSPITALIST

## 2019-06-10 PROCEDURE — 97161 PT EVAL LOW COMPLEX 20 MIN: CPT

## 2019-06-10 PROCEDURE — 77030038269 HC DRN EXT URIN PURWCK BARD -A

## 2019-06-10 PROCEDURE — 77010033678 HC OXYGEN DAILY

## 2019-06-10 PROCEDURE — 97530 THERAPEUTIC ACTIVITIES: CPT

## 2019-06-10 PROCEDURE — 74011250636 HC RX REV CODE- 250/636: Performed by: INTERNAL MEDICINE

## 2019-06-10 PROCEDURE — 65660000000 HC RM CCU STEPDOWN

## 2019-06-10 PROCEDURE — 97166 OT EVAL MOD COMPLEX 45 MIN: CPT | Performed by: OCCUPATIONAL THERAPIST

## 2019-06-10 PROCEDURE — 97530 THERAPEUTIC ACTIVITIES: CPT | Performed by: OCCUPATIONAL THERAPIST

## 2019-06-10 PROCEDURE — 74011250637 HC RX REV CODE- 250/637: Performed by: HOSPITALIST

## 2019-06-10 PROCEDURE — 74011250637 HC RX REV CODE- 250/637: Performed by: INTERNAL MEDICINE

## 2019-06-10 PROCEDURE — 94760 N-INVAS EAR/PLS OXIMETRY 1: CPT

## 2019-06-10 RX ORDER — HYDROXYZINE HYDROCHLORIDE 10 MG/1
10 TABLET, FILM COATED ORAL ONCE
Status: COMPLETED | OUTPATIENT
Start: 2019-06-10 | End: 2019-06-10

## 2019-06-10 RX ADMIN — METOPROLOL TARTRATE 25 MG: 25 TABLET ORAL at 17:44

## 2019-06-10 RX ADMIN — OMEGA-3 FATTY ACIDS CAP 1000 MG 1 CAPSULE: 1000 CAP at 17:44

## 2019-06-10 RX ADMIN — POLYETHYLENE GLYCOL 3350 17 G: 17 POWDER, FOR SOLUTION ORAL at 08:20

## 2019-06-10 RX ADMIN — ASPIRIN 81 MG 81 MG: 81 TABLET ORAL at 08:22

## 2019-06-10 RX ADMIN — HYDROCODONE BITARTRATE AND ACETAMINOPHEN 1 TABLET: 5; 325 TABLET ORAL at 21:25

## 2019-06-10 RX ADMIN — AMLODIPINE BESYLATE 5 MG: 5 TABLET ORAL at 08:22

## 2019-06-10 RX ADMIN — HYDROXYZINE HYDROCHLORIDE 10 MG: 10 TABLET, FILM COATED ORAL at 22:26

## 2019-06-10 RX ADMIN — TRAZODONE HYDROCHLORIDE 50 MG: 50 TABLET ORAL at 21:25

## 2019-06-10 RX ADMIN — HEPARIN SODIUM 5000 UNITS: 5000 INJECTION INTRAVENOUS; SUBCUTANEOUS at 08:21

## 2019-06-10 RX ADMIN — Medication: at 08:22

## 2019-06-10 RX ADMIN — Medication: at 17:44

## 2019-06-10 RX ADMIN — HYDROCODONE BITARTRATE AND ACETAMINOPHEN 1 TABLET: 5; 325 TABLET ORAL at 13:46

## 2019-06-10 RX ADMIN — PRAVASTATIN SODIUM 20 MG: 40 TABLET ORAL at 08:22

## 2019-06-10 RX ADMIN — LISINOPRIL 10 MG: 5 TABLET ORAL at 08:22

## 2019-06-10 RX ADMIN — HEPARIN SODIUM 5000 UNITS: 5000 INJECTION INTRAVENOUS; SUBCUTANEOUS at 19:55

## 2019-06-10 RX ADMIN — METOPROLOL TARTRATE 50 MG: 50 TABLET ORAL at 08:22

## 2019-06-10 RX ADMIN — OMEGA-3 FATTY ACIDS CAP 1000 MG 1 CAPSULE: 1000 CAP at 08:22

## 2019-06-10 RX ADMIN — HYDROCHLOROTHIAZIDE 25 MG: 25 TABLET ORAL at 08:22

## 2019-06-10 NOTE — PROGRESS NOTES
Pharmacy Medication Reconciliation     The patient was interviewed regarding current PTA medication list, use and drug allergies; She was unable to provide a complete list of her current medications, but she gave permission to speak to her son, Wen Shin ([phone]), and [facility] ([phone]) to get a full list of her prescription and OTC medications. Allergy Update: Ceftriaxone; Benadryl [diphenhydramine hcl]; Celebrex [celecoxib]; Ciprofloxacin; Ivp [fd and c blue no.1]; Macrobid [nitrofurantoin monohyd/m-cryst]; Niaspan [niacin]; Pcn [penicillins]; and Seafood [shellfish containing products]    Recommendations/Findings: The following amendments were made to the patient's active medication list on file at [facility]:   1) Additions:  None    2) Deletions:  Lisinopril  Captopril    3) Changes:  None    4) Pertinent Pharmacy Findings:  Son says she is not taking lisinopril or captopril anymore but seemed to think she was taking something to replace them. Pharmacy did not show her currently on any other ACEI/ARB but confirmed the rest of her HTN meds      -Clarified PTA med list with Wen Shin (son) and [facility]. PTA medication list was corrected to the following:     Prior to Admission Medications   Prescriptions Last Dose Informant Patient Reported? Taking? Fish Oil-Omega-3 Fatty Acids (FISH OIL) 360-1,200 mg Cap  Child Yes Yes   Sig: Take 1 Cap by mouth two (2) times a day. acetaminophen (TYLENOL) 325 mg tablet  Child Yes Yes   Sig: Take 325 mg by mouth every six (6) hours as needed for Pain. amLODIPine (NORVASC) 5 mg tablet  Child Yes Yes   Sig: Take 5 mg by mouth daily. aspirin 81 mg chewable tablet  Child Yes Yes   Sig: Take 81 mg by mouth daily. cyanocobalamin (VITAMIN B12) 1,000 mcg/mL injection  Child Yes Yes   Si,000 mcg by IntraMUSCular route every thirty (30) days.    diazePAM (VALIUM) 2 mg tablet  Child Yes Yes   Sig: Take  by mouth every six (6) hours as needed for Anxiety. hydroCHLOROthiazide (HYDRODIURIL) 25 mg tablet  Child Yes Yes   Sig: Take 25 mg by mouth daily. metoprolol tartrate (LOPRESSOR) 50 mg tablet  Child Yes Yes   Sig: Take 50 mg by mouth every morning. metoprolol tartrate (LOPRESSOR) 50 mg tablet  Child Yes Yes   Sig: Take 25 mg by mouth every evening.   multivitamin (ONE A DAY) tablet  Child Yes Yes   Sig: Take 1 Tab by mouth daily. nitroglycerin (NITRODUR) 0.2 mg/hr  Child Yes Yes   Si Patch by TransDERmal route daily. nitroglycerin (NITROSTAT) 0.4 mg SL tablet  Child Yes Yes   Si.4 mg by SubLINGual route every five (5) minutes as needed for Chest Pain. Up to 3 doses. simvastatin (ZOCOR) 10 mg tablet  Child Yes Yes   Sig: Take 10 mg by mouth daily. trazodone (DESYREL) 50 mg tablet  Child Yes Yes   Sig: Take 50 mg by mouth nightly.       Facility-Administered Medications: None          Thank you,  Nafisa Galdamez, [address] [facility] Student

## 2019-06-10 NOTE — PROGRESS NOTES
Son came to visit pt, asking if they are going to do any surgery for pt, explain what the note of orthopedic stated, he wants to talk to orthopedic I will ask nurses to call MD tomorrow to notify about son request

## 2019-06-10 NOTE — PROGRESS NOTES
Reason for Admission:   Patient came to ed for complaint of lower back pain. She fell earlier in the week and has had back pain off and on since then. Patient states she lives in home and her sister and sister's family live with her. Her son lives nearby and he works. Attempted to call the son however no answer. Patient states prior to coming to the hospital she ambulated without using dme. She does have a cane and walker at home. She denies using home oxygen. Her son was driving her to follow up appointments. PMHX significant thyroid disease, fibromyalgia, htn, prior mi s/p coronary stent placement. Palliative care consult has been ordered. RRAT Score:    12                 Plan for utilizing home health:   She has used home health services in the past however could not remember the name of the agency. She has been to [facility] in the past.                        Current Advanced Directive/Advance Care Plan:  Patient declined advance medical directive. Likelihood of Readmission:  Moderate                         Transition of Care Plan:    Patient would like to go home and is fine with home health services if needed. PT and OT are in seeing patient at this time. She will follow up with medical care team when discharged. Care Management Interventions  PCP Verified by CM: Yes  Transition of Care Consult (CM Consult): Discharge Planning  Discharge Durable Medical Equipment: (Patient has cane and walker at home. )  Physical Therapy Consult: Yes  Occupational Therapy Consult: Yes  Current Support Network:  Other(She states her sister and sister's family live with her. )  Confirm Follow Up Transport: Family  Plan discussed with Pt/Family/Caregiver: Yes  Discharge Location  Discharge Placement: Home

## 2019-06-10 NOTE — PROGRESS NOTES
Patient refusing IV. Patient pulled two overnight. MD aware, no new iv meds ordered. Patient pain controlled with PO meds, tolerating PO diet well.

## 2019-06-10 NOTE — PROGRESS NOTES
Bedside shift change report given to [facility] (oncoming nurse) by Rohit Hurtado (offgoing nurse). Report included the following information SBAR, Kardex, Intake/Output, MAR and Recent Results.

## 2019-06-10 NOTE — PROGRESS NOTES
Problem: Self Care Deficits Care Plan (Adult)  Goal: *Acute Goals and Plan of Care (Insert Text)  Description  Occupational Therapy Goals:  Initiated 6/10/2019  1. Patient will perform grooming standing with supervision/set-up within 7 days. 2. Patient will perform lower body dressing with supervision/set-up with AE within 7 days. 3. Patient will perform bathing with supervision/set-up with AE within 7 days. 4. Patient will toileting with supervision/set up within 7 days. 5. Patient will transfer from toilet with supervision/set-up using the least restrictive device and appropriate durable medical equipment within 7 days. Outcome: Progressing Towards Goal   OCCUPATIONAL THERAPY EVALUATION  Patient: My Burns (39 y.o. female)  Date: 6/10/2019  Primary Diagnosis: CAP (community acquired pneumonia) [J18.9]        Precautions:   Fall, Back    ASSESSMENT :  Based on the objective data described below, the patient presents with pleasant confusion and was limited by back pain and has t-spine compression fractures T10-12 and L3 and 5. Pt was educated on log roll and pain reduction techniques. Max assist for log roll technique to EOB and to scoot to EOB. Unable to perform LB ADLS due to back pain and pt may benefit from AE training. Min assist for sit to stand and to stand pivot transfer to bedside chair with RW for support. Pt is performing UB ADLS at a set up to min assist level and lower body ADLS at a max/total assist level. Recommend SNF for rehab at discharge. Patient will benefit from skilled intervention to address the above impairments. Patient?s rehabilitation potential is considered to be Fair  Factors which may influence rehabilitation potential include:   ? None noted  ? Mental ability/status  ? Medical condition  ? Home/family situation and support systems  ? Safety awareness  ?              Pain tolerance/management  ? Other:      PLAN :  Recommendations and Planned Interventions:  ?               Self Care Training                  ? Therapeutic Activities  ? Functional Mobility Training    ? Cognitive Retraining  ? Therapeutic Exercises           ? Endurance Activities  ? Balance Training                   ? Neuromuscular Re-Education  ? Visual/Perceptual Training     ? Home Safety Training  ? Patient Education                 ? Family Training/Education  ? Other (comment):    Frequency/Duration: Patient will be followed by occupational therapy 4 times a week to address goals. Discharge Recommendations: Anurag Luna  Further Equipment Recommendations for Discharge: TBD      SUBJECTIVE:   Patient stated ? My aunt is older than I am.? It think pts aunt lives with her    OBJECTIVE DATA SUMMARY:   HISTORY:   Past Medical History:   Diagnosis Date    Angina     has had mi    Arrhythmia     Cancer ([facility] [address]     basal cell removed form back    Chronic pain     fibromyalgia    Hypertension     Insomnia     Thyroid disease      Past Surgical History:   Procedure Laterality Date    HX CHOLECYSTECTOMY      HX CORONARY STENT PLACEMENT      HX OTHER SURGICAL      basal cell removed from back       Prior Level of Function/Environment/Context: ambulated with rollator walker, reports performing ADLS and iADLS on her own, does not drive true accuracy is unknown as pt is confused  Expanded or extensive additional review of patient history:     Home Situation  Home Environment: Private residence  # Steps to Enter: 3  Rails to Enter: Yes  Wheelchair Ramp: Yes  One/Two Story Residence: One story  Living Alone: No  Support Systems: Family member(s)(Aunt)  Current DME Used/Available at Home: Savana Prajapati, rollator, Shower chair, Grab bars  Tub or Shower Type: Tub/Shower combination    Hand dominance: Right    EXAMINATION OF PERFORMANCE DEFICITS:  Cognitive/Behavioral Status:  Neurologic State: Alert  Orientation Level: Disoriented to situation;Disoriented to time;Oriented to person;Oriented to place  Cognition: Decreased attention/concentration; Follows commands  Perception: Appears intact  Perseveration: No perseveration noted  Safety/Judgement: Fall prevention        Hearing: Auditory  Auditory Impairment: None    Vision/Perceptual:                                Corrective Lenses: Glasses    Range of Motion:    AROM: Generally decreased, functional                         Strength:    Strength: Generally decreased, functional                Coordination:     Fine Motor Skills-Upper: Left Intact; Right Intact    Gross Motor Skills-Upper: Left Intact; Right Intact           Balance:  Sitting: Impaired  Sitting - Static: Good (unsupported)  Sitting - Dynamic: Fair (occasional)  Standing: Impaired; With support  Standing - Static: Constant support; Fair  Standing - Dynamic : Fair    Functional Mobility and Transfers for ADLs:  Bed Mobility:  Rolling: Maximum assistance  Supine to Sit: Maximum assistance  Scooting: Maximum assistance(limited by pain)    Transfers:  Sit to Stand: Minimum assistance;Assist x2  Stand to Sit: Minimum assistance;Assist x2  Bed to Chair: Minimum assistance;Assist x2  Bathroom Mobility: (unable at this time due to pain)  Toilet Transfer : Minimum assistance; Additional time(stand pivot to and from [facility])    ADL Assessment:  Feeding: Independent(once provided)    Oral Facial Hygiene/Grooming: Setup(seated)    Bathing: Maximum assistance(due to pain)    Upper Body Dressing: Minimum assistance    Lower Body Dressing: Total assistance;Maximum assistance    Toileting: Maximum assistance; Total assistance       Cognitive Retraining  Safety/Judgement: Fall prevention        Functional Measure:  Barthel Index:    Bathin  Bladder: 5  Bowels: 5  Groomin  Dressin  Feeding: 10  Mobility: 0  Stairs: 0  Toilet Use: 0  Transfer (Bed to Chair and Back): 10  Total: 40/100        Percentage of impairment   0%   1-19%   20-39%   40-59%   60-79%   80-99%   100%   Barthel Score 0-100 100 99-80 79-60 59-40 20-39 1-19   0     The Barthel ADL Index: Guidelines  1. The index should be used as a record of what a patient does, not as a record of what a patient could do. 2. The main aim is to establish degree of independence from any help, physical or verbal, however minor and for whatever reason. 3. The need for supervision renders the patient not independent. 4. A patient's performance should be established using the best available evidence. Asking the patient, friends/relatives and nurses are the usual sources, but direct observation and common sense are also important. However direct testing is not needed. 5. Usually the patient's performance over the preceding 24-48 hours is important, but occasionally longer periods will be relevant. 6. Middle categories imply that the patient supplies over 50 per cent of the effort. 7. Use of aids to be independent is allowed. Genet Pope., Barthel, D.W. (3408). Functional evaluation: the Barthel Index. [facility] (14)2. Daisy Sampson coy GARRETT Quiroz, Sonja Witt., Sara Snyder.[facility], [address] [place] [address] (1999). Measuring the change indisability after inpatient rehabilitation; comparison of the responsiveness of the Barthel Index and Functional [place] Measure. Journal of Neurology, Neurosurgery, and Psychiatry, 66(4), 650-449. Jairo Royal N.EMMANUEL.A, NOMI Reno, & Robert Campbell, M.A. (2004.) Assessment of post-stroke quality of life in cost-effectiveness studies: The usefulness of the Barthel Index and the EuroQoL-5D.  Quality of Life Research, 15, 889-44       Occupational Therapy Evaluation Charge Determination   History Examination Decision-Making   MEDIUM Complexity : Expanded review of history including physical, cognitive and psychosocial history  MEDIUM Complexity : 3-5 performance deficits relating to physical, cognitive , or psychosocial skils that result in activity limitations and / or participation restrictions MEDIUM Complexity : Patient may present with comorbidities that affect occupational performnce. Miniml to moderate modification of tasks or assistance (eg, physical or verbal ) with assesment(s) is necessary to enable patient to complete evaluation       Based on the above components, the patient evaluation is determined to be of the following complexity level: MEDIUM  Pain:  Pain Scale 1: Numeric (0 - 10)  Pain Intensity 1: 5  Pain Location 1: Back  Pain Orientation 1: Medial  Pain Description 1: Aching  Pain Intervention(s) 1: Medication (see MAR)  Activity Tolerance:     Please refer to the flowsheet for vital signs taken during this treatment. After treatment:   ? Patient left in no apparent distress sitting up in chair  ? Patient left in no apparent distress in bed  ? Call bell left within reach  ? Nursing notified  ? Caregiver present  ? Bed alarm activated    COMMUNICATION/EDUCATION:   The patient?s plan of care was discussed with: Physical Therapist, Registered Nurse and patient . ? Home safety education was provided and the patient/caregiver indicated understanding. ? Patient/family have participated as able in goal setting and plan of care. ? Patient agree to work toward stated goals and plan of care. ? Patient understands intent and goals of therapy, but is neutral about his/her participation. ? Patient is unable to participate in goal setting and plan of care. This patient?s plan of care is appropriate for delegation to [facility].     Thank you for this referral.  Aayush Garza, OTR/L  Time Calculation: 17 mins

## 2019-06-10 NOTE — PROGRESS NOTES
Physical Therapy Screening:    [facility] screening referral was triggered for physical therapy based on results obtained during the nursing admission assessment. The patients chart was reviewed and the patient is appropriate for a skilled therapy evaluation if there is a decline in functional mobility from baseline. Please order a consult for physical therapy if you are in agreement and would like an evaluation to be completed. Thank you.     Pamela Dao, PT, DPT

## 2019-06-10 NOTE — CONSULTS
Vascular Surgery Consult Note  6/10/2019    Subjective:     Tammi Cisneros is a pleasant, frail, elderly, 80 y.o.  female with a pmhx significant for ASHD, HTN, thyroid disease, osteopenia, and chronic back pain. She is known to the service from a splenic artery aneurysm. She presents on this occasion for acute on chronic back pain after recent GLFs. Her pain on this occasion is specifically upper back with lateralization to the right side. On arrival she was hypertensive with a normal HR. She was hypoxic when sleeping. She has a CT of the spine that revealed acute on chronic compression fractures of the thoracic spine and \"unchanged\" splenic artery aneurysm. We have been asked to evaluate. Since admission patient has been evaluated by orthopedics and offered kyphoplasty which she has declined. 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 No pertinent family history. Social History     Tobacco Use    Smoking status: Never Smoker    Smokeless tobacco: Never Used   Substance Use Topics    Alcohol use: No       Prior to Admission medications    Medication Sig Start Date End Date Taking? Authorizing Provider   captopril (CAPOTEN) 100 mg tablet Take 100 mg by mouth three (3) times daily. Yes Other, MD Luana   LISINOPRIL PO Take  by mouth.    Yes Other, MD Luana   diazePAM (VALIUM) 2 mg tablet Take  by mouth every six (6) hours as needed for Anxiety. Yes Other, MD Luana   aspirin 81 mg chewable tablet Take 81 mg by mouth daily. Yes Meenu, MD Luana   hydroCHLOROthiazide (HYDRODIURIL) 25 mg tablet Take 25 mg by mouth daily. Yes Provider, Historical   metoprolol tartrate (LOPRESSOR) 50 mg tablet Take 50 mg by mouth every morning. Yes Provider, Historical   multivitamin (ONE A DAY) tablet Take 1 Tab by mouth daily. Yes Provider, Historical   amLODIPine (NORVASC) 5 mg tablet Take 5 mg by mouth daily. Yes Provider, Historical   simvastatin (ZOCOR) 10 mg tablet Take 10 mg by mouth daily. Yes Provider, Historical   cyanocobalamin (VITAMIN B12) 1,000 mcg/mL injection 1,000 mcg by IntraMUSCular route every thirty (30) days. Provider, Historical   metoprolol tartrate (LOPRESSOR) 50 mg tablet Take 25 mg by mouth every evening. Provider, Historical   acetaminophen (TYLENOL) 325 mg tablet Take 325 mg by mouth every six (6) hours as needed for Pain. Provider, Historical   nitroglycerin (NITROSTAT) 0.4 mg SL tablet 0.4 mg by SubLINGual route every five (5) minutes as needed for Chest Pain. Up to 3 doses. Provider, Historical   nitroglycerin (NITRODUR) 0.2 mg/hr 1 Patch by TransDERmal route daily. Other, MD Luana   trazodone (DESYREL) 50 mg tablet Take 50 mg by mouth nightly. Other, MD Luana   Fish Oil-Omega-3 Fatty Acids (FISH OIL) 360-1,200 mg Cap Take 1 Cap by mouth two (2) times a day.  12/2/10   Provider, Historical     Allergies   Allergen Reactions    Ceftriaxone Hives    Benadryl [Diphenhydramine Hcl] Hives and Rash     Patient's son says she's not allergic    Celebrex [Celecoxib] Rash    Ciprofloxacin Rash    Ivp [Fd And C Blue No.1] Rash and Swelling    Macrobid [Nitrofurantoin Monohyd/M-Cryst] Swelling    Niaspan [Niacin] Palpitations    Pcn [Penicillins] Rash and Swelling    Seafood [Shellfish Containing Products] Rash and Swelling      Review of Systems   Constitutional: Positive for activity change and fatigue. Negative for chills and fever. HENT: Negative. Negative for congestion. Eyes: Negative for visual disturbance. Respiratory: Negative for cough, chest tightness and shortness of breath. Cardiovascular: Negative for chest pain and leg swelling. Gastrointestinal: Negative for nausea and vomiting. Endocrine: Negative for polydipsia and polyuria. Genitourinary: Negative. Musculoskeletal: Positive for arthralgias, back pain and gait problem. Skin: Negative for color change and wound. Allergic/Immunologic: Negative. Neurological: Positive for weakness. Hematological: Negative. Psychiatric/Behavioral: Negative. Objective:       Patient Vitals for the past 24 hrs:   BP Temp Pulse Resp SpO2   06/10/19 0725 114/55 97.6 °F (36.4 °C) 76 16 96 %   06/10/19 0400 139/71 98.2 °F (36.8 °C) 65 16 92 %   06/09/19 2325 146/66 98.3 °F (36.8 °C) 69 16 93 %   06/09/19 1945 172/84 98.1 °F (36.7 °C) 74 14 93 %   06/09/19 1739 147/66  72  95 %   06/09/19 1627 (!) 104/39 98 °F (36.7 °C) 71 18 95 %   06/09/19 1224 131/65 98.3 °F (36.8 °C) 69 18 97 %       Physical Exam   Constitutional: She is oriented to person, place, and time. Patient is a pleasant, frail, elderly, CF in NAD    HENT:   Head: Normocephalic and atraumatic. Eyes: Pupils are equal, round, and reactive to light. EOM are normal.   Neck: Normal range of motion. Neck supple. Cardiovascular: Normal rate and regular rhythm. Pulmonary/Chest: Effort normal. No respiratory distress. Abdominal: She exhibits no distension. Musculoskeletal: Normal range of motion. Neurological: She is alert and oriented to person, place, and time. Poor short term memory recall    Skin: Skin is warm and dry.    Psychiatric: Her behavior is normal. Thought content normal.     Pertinent Test Results:   Recent Results (from the past 24 hour(s))   GLUCOSE, POC    Collection Time: 06/09/19  9:27 PM   Result Value Ref Range    Glucose (POC) 122 (H) 65 - 100 mg/dL    Performed by Marleny Anguiano (PCT)        Assessmen/Plan:     Consult problem  Splenic artery aneurysm   Stable from previous exam. She is not of child bearing age and it is less than 2cm. No further vascular evaluation, procedure, or follow up needed. This was explained to the patient. Acute problems  Acute T10/T11 compression fracture  -hx of T12, L1, L3-5 compression fracures  -hx of kyphoplasty in 2018  -fibromyalgia   Osteopenia   Acute back pain  Chronic pain syndrome   -on lidocaine patch, Norco PRN, and IVP morphine PRN for break thru  Plan per orthopedics and primary team.    Hyponatremia   -IVF ordered    Elevated red blood cell count  Plan per primary team     Active problems:  ASHD   -on ASA, BBlocker, and statin   Hypertensive urgency   -improved with resumption of home regimen and pain management   -on CCB, HCTZ, ACE, and BBlocker  HLD   - on statin   Interstitial lung disease  -per CT scan   Thyroid disease  -TSH 1.84  Hx of non-obstructing renal calculi    Insomnia  -on Trazadone    B12 deficiency  -517   Anxiety  Constipation   -Miralax  Management of comorbid conditions by primary team.     VTE Prophylaxis:  [facility]    Disposition:  TBD by primary team.   Agree with palliative care consult. Vascular surgery signing off. We appreciate the opportunity to participate in the care of Ms. Ann. No further vascular evaluation, procedure, or follow up needed at this time.       Signed By: Michelle Frazier NP     Lindsey 10, 2019

## 2019-06-10 NOTE — PROGRESS NOTES
Hospitalist Progress Note    NAME: Karla Loya   :  1927   MRN:  [id number]       Assessment / Plan:  Osteoporotic Compression fracture causing inability to ambulate   Intractable pain   Ct  1. Osteopenia. Interval fractures of T10 and T11. The T-10-T11 fracture appears are acute. 2. Essentially unchanged splenic artery aneurysm  3. Otherwise no acute abnormality of the abdomen and pelvis  xrays  Osteopenia. A displaced rib fracture is not identified  2. Diffuse interstitial changes most confluent at the left base  --MRI lumbar and thoracic spine reviewed  -PT/OT /fall precautions   Ortho on board, now wants to get kyphoplasty  c/w lidocaine patch, c/w current pain meds. CT head negative    CAP ? C/w doxycyline. Follow cultures. Polycythemia:   Outpatient follow up with hematology.     Rt elbow pain xrays negative for fracture  Bilateral  calf pain r/o dvt by doppler      15 mm splenic artery aneurysm unchanged  -by CT, appreciate input from vascular, no intervenstion required.     HTN/ CAD ( h/o MI with stents )   -BP stable  -cont norvasc/ metoprolol/lisinopril unsure of dose start low dose  Iv hydralazine prn     Fibromyalgia,trazodone for sleep   HLP, cont zocor         Pharmacy consulted for med rec         Code Status: Full code d/w pt and son at bedside but would not want to remain in vegetable state   Surrogate Decision Maker: son      DVT Prophylaxis:  Heparin   GI Prophylaxis: not indicated     Baseline: lives alone; ambulating with cane/walker  has 1 son has aid          25.0 - 29.9 Overweight / Body mass index is 25.04 kg/m². Recommended Disposition:  PT, OT, RN     Subjective:     Chief Complaint / Reason for Physician Visit  \"Complains of back pain which is improving\". Discussed with RN events overnight.      Review of Systems:  Symptom Y/N Comments  Symptom Y/N Comments   Fever/Chills    Chest Pain     Poor Appetite    Edema     Cough    Abdominal Pain     Sputum Joint Pain     SOB/REBOLLAR    Pruritis/Rash     Nausea/vomit    Tolerating PT/OT     Diarrhea    Tolerating Diet     Constipation    Other       Could NOT obtain due to:      Objective:     VITALS:   Last 24hrs VS reviewed since prior progress note. Most recent are:  Patient Vitals for the past 24 hrs:   Temp Pulse Resp BP SpO2   06/10/19 0725 97.6 °F (36.4 °C) 76 16 114/55 96 %   06/10/19 0400 98.2 °F (36.8 °C) 65 16 139/71 92 %   06/09/19 2325 98.3 °F (36.8 °C) 69 16 146/66 93 %   06/09/19 1945 98.1 °F (36.7 °C) 74 14 172/84 93 %   06/09/19 1739  72  147/66 95 %   06/09/19 1627 98 °F (36.7 °C) 71 18 (!) 104/39 95 %   06/09/19 1224 98.3 °F (36.8 °C) 69 18 131/65 97 %       Intake/Output Summary (Last 24 hours) at 6/10/2019 1021  Last data filed at 6/9/2019 2325  Gross per 24 hour   Intake    Output 300 ml   Net -300 ml        PHYSICAL EXAM:  General: WD, WN. Alert, cooperative, no acute distress    EENT:  EOMI. Anicteric sclerae. MMM  Resp:  CTA bilaterally, no wheezing or rales. No accessory muscle use  CV:  Regular  rhythm,  No edema  GI:  Soft, Non distended, Non tender.    Neurologic:  Alert and oriented X 3, normal speech,   Psych:   Good insight. Not anxious nor agitated  Skin:  No rashes. No jaundice    Reviewed most current lab test results and cultures  YES  Reviewed most current radiology test results   YES  Review and summation of old records today    NO  Reviewed patient's current orders and MAR    YES  PMH/SH reviewed - no change compared to H&P  ________________________________________________________________________  Care Plan discussed with:    Comments   Patient x    Family  x    RN x    Care Manager     Consultant                        Multidiciplinary team rounds were held today with , nursing, pharmacist and clinical coordinator. Patient's plan of care was discussed; medications were reviewed and discharge planning was addressed. ________________________________________________________________________    ________________________________________________________________________  Sergey Colorado MD     Procedures: see electronic medical records for all procedures/Xrays and details which were not copied into this note but were reviewed prior to creation of Plan. LABS:  I reviewed today's most current labs and imaging studies.   Pertinent labs include:  Recent Labs     06/09/19  0359 06/08/19  1428   WBC 10.8 10.3   HGB 17.1* 16.6*   HCT 50.9* 50.5*    301     Recent Labs     06/09/19  0359 06/08/19  1351   * 136   K 3.7 3.8    101   CO2 27 30   * 109*   BUN 9 9   CREA 0.63 0.57   CA 8.6 9.1   MG 1.9  --    ALB 3.3* 3.6   TBILI 0.9 0.6   SGOT 19 24   ALT 17 20       Signed: Sergey Colorado MD

## 2019-06-10 NOTE — CONSULTS
Palliative Medicine Consult  Keanu: [phone] ([phone])    Patient Name: Néstor Sheridan  YOB: 1927    Date of Initial Consult: 6/10/2019  Reason for Consult: Care Decisions  Requesting Provider: Randa Voss MD   Primary Care Physician: Phong Flores MD     SUMMARY:   Néstor Sheridan is a 80 y.o. with a past history of fibromyalgia, thyroid disease, hypertension, basal cell on back s/p removal, and previous MI s/p coronary stent placement, who was admitted on 6/8/2019 from home with a diagnosis of Osteoporotic Compression Fx s/p fall  Current medical issues leading to Palliative Medicine involvement include: Goals of care discussion in setting of frailty and advanced age. Social: Mrs Mayank Parisi lives in her own home, her Sanjana Amaral has recently moved in with her to provide support. She has one son who lives near by who also helps her out      PALLIATIVE DIAGNOSES:   1. Goals of Care Discussion  2. Advanced Care Planning  3. Back Pain  4. Frailty       PLAN:   1. Met with patient alone  2. She is a very pleasant older lady, tells me she feels well this morning, and has been able to get out of bed with manageable pain  3. We talked about completing an AMD while she was here to outline her wishes at the end of her life- she declined to do so, sharing that her son (only child, and NOK) knows what her wishes are  4. She does with to remain a full code, but not prolonged on life support with no hope for recovery  5. She is hopeful to go home soon, and of note- she no longer lives alone, as her Sanjana Amaral has recently moved in with her (I am unsure the functional status of her aunt, but she shared that they look out for each other)  6. She is not interested in another kyphoplasty, as she does not feel it improved her functional status much the first time she had one done  7.  Her goals this admission are clear, her son is her legal next of kin, and she states that he knows what her wishes are and will uphold them  8. Thank you for the consult, will sign off  9. Initial consult note routed to primary continuity provider and/or primary health care team members  10. Communicated plan of care with: Palliative IDT, [facility] Team     GOALS OF CARE / TREATMENT PREFERENCES:     GOALS OF CARE:  Patient/Health Care Proxy Stated Goals: Prolong life    TREATMENT PREFERENCES:   Code Status: Full Code    Advance Care Planning:  [x] [facility] Interdisciplinary Team has updated the ACP Navigator with Health Care Decision Maker and Patient Capacity       Primary Decision MakerTura Sender Child - [phone]    Medical Interventions: Full interventions     Other Instructions: Other:    As far as possible, the palliative care team has discussed with patient / health care proxy about goals of care / treatment preferences for patient.      HISTORY:     History obtained from: patient, chart    CHIEF COMPLAINT: none    HPI/SUBJECTIVE:    The patient is:   [x] Verbal and participatory  [] Non-participatory due to:     Alert, pleasant, does not appear to be in any acute distress  Tells me her pain is manageable  Tells me she has been able to get out of bed     Clinical Pain Assessment (nonverbal scale for severity on nonverbal patients):   Clinical Pain Assessment  Severity: 0          Duration: for how long has pt been experiencing pain (e.g., 2 days, 1 month, years)  Frequency: how often pain is an issue (e.g., several times per day, once every few days, constant)     FUNCTIONAL ASSESSMENT:     Palliative Performance Scale (PPS):  PPS: 40       PSYCHOSOCIAL/SPIRITUAL SCREENING:     Palliative IDT has assessed this patient for cultural preferences / practices and a referral made as appropriate to needs (Cultural Services, Patient Advocacy, Ethics, etc.)    Any spiritual / Holiness concerns:  [] Yes /  [x] No    Caregiver Burnout:  [] Yes /  [] No /  [x] No Caregiver Present      Anticipatory grief assessment: [x] Normal  / [] Maladaptive       ESAS Anxiety: Anxiety: 0    ESAS Depression: Depression: 0        REVIEW OF SYSTEMS:     Positive and pertinent negative findings in ROS are noted above in HPI. The following systems were [x] reviewed / [] unable to be reviewed as noted in HPI  Other findings are noted below. Systems: constitutional, ears/nose/mouth/throat, respiratory, gastrointestinal, genitourinary, musculoskeletal, integumentary, neurologic, psychiatric, endocrine. Positive findings noted below. Modified ESAS Completed by: provider   Fatigue: 0     Depression: 0 Pain: 0(no pain when she is still- in bed)   Anxiety: 0 Nausea: 0   Anorexia: 0 Dyspnea: 0     Constipation: No              PHYSICAL EXAM:     From RN flowsheet:  Wt Readings from Last 3 Encounters:   06/08/19 124 lb (56.2 kg)   06/09/19 123 lb 14.4 oz (56.2 kg)   10/29/18 124 lb 12.5 oz (56.6 kg)     Blood pressure 114/55, pulse 76, temperature 97.6 °F (36.4 °C), resp. rate 16, height 4' 11\" (1.499 m), weight 124 lb (56.2 kg), SpO2 96 %.     Pain Scale 1: Numeric (0 - 10)  Pain Intensity 1: 6  Pain Onset 1: 1 week  Pain Location 1: Back  Pain Orientation 1: Mid, Lower  Pain Description 1: Aching  Pain Intervention(s) 1: Medication (see MAR)  Last bowel movement, if known:     Constitutional: alert, oriented, pleasant, in no acute distress  Eyes: pupils equal, anicteric  ENMT: no nasal discharge, moist mucous membranes  Cardiovascular: regular rhythm,   Respiratory: breathing not labored, symmetric  Gastrointestinal: soft non-tender,   Musculoskeletal: no deformity  Skin: warm, dry  Neurologic: following commands, moving all extremities  Psychiatric: full affect, no hallucinations  Other:       HISTORY:     Active Problems:    CAP (community acquired pneumonia) (6/8/2019)      Past Medical History:   Diagnosis Date    Angina     has had mi    Arrhythmia     Cancer ([facility] [address]     basal cell removed form back    Chronic pain     fibromyalgia    Hypertension     Insomnia     Thyroid disease       Past Surgical History:   Procedure Laterality Date    HX CHOLECYSTECTOMY      HX CORONARY STENT PLACEMENT      HX OTHER SURGICAL      basal cell removed from back      History reviewed. No pertinent family history. History reviewed, no pertinent family history.   Social History     Tobacco Use    Smoking status: Never Smoker    Smokeless tobacco: Never Used   Substance Use Topics    Alcohol use: No     Allergies   Allergen Reactions    Ceftriaxone Hives    Benadryl [Diphenhydramine Hcl] Hives and Rash     Patient's son says she's not allergic    Celebrex [Celecoxib] Rash    Ciprofloxacin Rash    Ivp [Fd And C Blue No.1] Rash and Swelling    Macrobid [Nitrofurantoin Monohyd/M-Cryst] Swelling    Niaspan [Niacin] Palpitations    Pcn [Penicillins] Rash and Swelling    Seafood [Shellfish Containing Products] Rash and Swelling      Current Facility-Administered Medications   Medication Dose Route Frequency    lidocaine 4 % patch 1 Patch  1 Patch TransDERmal Q24H    hydrocortisone (CORTAID) 1 % cream   Topical BID    sodium chloride (NS) flush 5-40 mL  5-40 mL IntraVENous Q8H    sodium chloride (NS) flush 5-40 mL  5-40 mL IntraVENous PRN    0.9% sodium chloride infusion  75 mL/hr IntraVENous CONTINUOUS    heparin (porcine) injection 5,000 Units  5,000 Units SubCUTAneous Q12H    acetaminophen (TYLENOL) tablet 650 mg  650 mg Oral Q4H PRN    HYDROcodone-acetaminophen (NORCO) 5-325 mg per tablet 1 Tab  1 Tab Oral Q4H PRN    morphine injection 1 mg  1 mg IntraVENous Q4H PRN    ondansetron (ZOFRAN) injection 4 mg  4 mg IntraVENous Q6H PRN    albuterol-ipratropium (DUO-NEB) 2.5 MG-0.5 MG/3 ML  3 mL Nebulization Q4H PRN    amLODIPine (NORVASC) tablet 5 mg  5 mg Oral DAILY    aspirin chewable tablet 81 mg  81 mg Oral DAILY    omega 3-DHA-EPA-fish oil 1,000 mg (120 mg-180 mg) capsule 1 Cap  1 Cap Oral BID    hydroCHLOROthiazide (HYDRODIURIL) tablet 25 mg  25 mg Oral DAILY    metoprolol tartrate (LOPRESSOR) tablet 50 mg  50 mg Oral 7am    metoprolol tartrate (LOPRESSOR) tablet 25 mg  25 mg Oral QPM    multivitamin, tx-iron-ca-min (THERA-M w/ IRON) tablet 1 Tab  1 Tab Oral DAILY    pravastatin (PRAVACHOL) tablet 20 mg  20 mg Oral DAILY    traZODone (DESYREL) tablet 50 mg  50 mg Oral QHS    hydrALAZINE (APRESOLINE) 20 mg/mL injection 10 mg  10 mg IntraVENous Q6H PRN    lisinopril (PRINIVIL, ZESTRIL) tablet 10 mg  10 mg Oral DAILY    polyethylene glycol (MIRALAX) packet 17 g  17 g Oral DAILY          LAB AND IMAGING FINDINGS:     Lab Results   Component Value Date/Time    WBC 10.8 06/09/2019 03:59 AM    HGB 17.1 (H) 06/09/2019 03:59 AM    PLATELET 456 16/20/1148 03:59 AM     Lab Results   Component Value Date/Time    Sodium 133 (L) 06/09/2019 03:59 AM    Potassium 3.7 06/09/2019 03:59 AM    Chloride 101 06/09/2019 03:59 AM    CO2 27 06/09/2019 03:59 AM    BUN 9 06/09/2019 03:59 AM    Creatinine 0.63 06/09/2019 03:59 AM    Calcium 8.6 06/09/2019 03:59 AM    Magnesium 1.9 06/09/2019 03:59 AM    Phosphorus 3.2 10/30/2018 04:44 AM      Lab Results   Component Value Date/Time    AST (SGOT) 19 06/09/2019 03:59 AM    Alk.  phosphatase 98 06/09/2019 03:59 AM    Protein, total 7.5 06/09/2019 03:59 AM    Albumin 3.3 (L) 06/09/2019 03:59 AM    Globulin 4.2 (H) 06/09/2019 03:59 AM     Lab Results   Component Value Date/Time    INR 1.1 02/04/2016 08:32 PM    Prothrombin time 11.2 (H) 02/04/2016 08:32 PM    aPTT 30.3 02/04/2016 08:32 PM      No results found for: IRON, FE, TIBC, IBCT, PSAT, FERR   No results found for: PH, PCO2, PO2  No components found for: [place]   Lab Results   Component Value Date/Time     02/06/2016 03:14 AM    CK - MB 2.1 02/06/2016 03:14 AM                Total time:   Counseling / coordination time, spent as noted above:   > 50% counseling / coordination?:     Prolonged service was provided for  []30 min   []75 min in face to face time in the presence of the patient, spent as noted above. Time Start:   Time End:   Note: this can only be billed with 20232 (initial) or 78345 (follow up). If multiple start / stop times, list each separately.

## 2019-06-10 NOTE — PROGRESS NOTES
Medicare pt has received, reviewed, and signed 2nd IM letter informing them of their right to appeal the discharge. Signed copy has been placed on pt bedside chart.                   Concetta Lennox Lassiter-Hughes  [phone]

## 2019-06-11 PROCEDURE — 74011250637 HC RX REV CODE- 250/637: Performed by: INTERNAL MEDICINE

## 2019-06-11 PROCEDURE — 94760 N-INVAS EAR/PLS OXIMETRY 1: CPT

## 2019-06-11 PROCEDURE — 97530 THERAPEUTIC ACTIVITIES: CPT | Performed by: OCCUPATIONAL THERAPIST

## 2019-06-11 PROCEDURE — 97535 SELF CARE MNGMENT TRAINING: CPT | Performed by: OCCUPATIONAL THERAPIST

## 2019-06-11 PROCEDURE — 74011250636 HC RX REV CODE- 250/636: Performed by: HOSPITALIST

## 2019-06-11 PROCEDURE — 74011250636 HC RX REV CODE- 250/636: Performed by: INTERNAL MEDICINE

## 2019-06-11 PROCEDURE — 65660000000 HC RM CCU STEPDOWN

## 2019-06-11 PROCEDURE — 74011000250 HC RX REV CODE- 250: Performed by: HOSPITALIST

## 2019-06-11 PROCEDURE — 77010033678 HC OXYGEN DAILY

## 2019-06-11 PROCEDURE — 74011250637 HC RX REV CODE- 250/637: Performed by: HOSPITALIST

## 2019-06-11 PROCEDURE — 92610 EVALUATE SWALLOWING FUNCTION: CPT

## 2019-06-11 PROCEDURE — 97116 GAIT TRAINING THERAPY: CPT

## 2019-06-11 RX ORDER — FUROSEMIDE 10 MG/ML
20 INJECTION INTRAMUSCULAR; INTRAVENOUS ONCE
Status: COMPLETED | OUTPATIENT
Start: 2019-06-11 | End: 2019-06-11

## 2019-06-11 RX ORDER — AMOXICILLIN 250 MG
2 CAPSULE ORAL 2 TIMES DAILY
Status: DISCONTINUED | OUTPATIENT
Start: 2019-06-11 | End: 2019-06-13 | Stop reason: HOSPADM

## 2019-06-11 RX ORDER — OXYCODONE HYDROCHLORIDE 5 MG/1
2.5 TABLET ORAL
Status: DISCONTINUED | OUTPATIENT
Start: 2019-06-11 | End: 2019-06-13 | Stop reason: HOSPADM

## 2019-06-11 RX ORDER — CLINDAMYCIN HYDROCHLORIDE 150 MG/1
600 CAPSULE ORAL 3 TIMES DAILY
Status: DISCONTINUED | OUTPATIENT
Start: 2019-06-11 | End: 2019-06-13 | Stop reason: HOSPADM

## 2019-06-11 RX ORDER — FENTANYL CITRATE 50 UG/ML
25 INJECTION, SOLUTION INTRAMUSCULAR; INTRAVENOUS
Status: DISCONTINUED | OUTPATIENT
Start: 2019-06-11 | End: 2019-06-13 | Stop reason: HOSPADM

## 2019-06-11 RX ORDER — OXYCODONE HYDROCHLORIDE 5 MG/1
5 TABLET ORAL
Status: DISCONTINUED | OUTPATIENT
Start: 2019-06-11 | End: 2019-06-13 | Stop reason: HOSPADM

## 2019-06-11 RX ORDER — ACETAMINOPHEN 325 MG/1
650 TABLET ORAL EVERY 6 HOURS
Status: DISCONTINUED | OUTPATIENT
Start: 2019-06-11 | End: 2019-06-13 | Stop reason: HOSPADM

## 2019-06-11 RX ORDER — HYDROXYZINE HYDROCHLORIDE 10 MG/1
10 TABLET, FILM COATED ORAL
Status: DISCONTINUED | OUTPATIENT
Start: 2019-06-11 | End: 2019-06-13 | Stop reason: HOSPADM

## 2019-06-11 RX ADMIN — METOPROLOL TARTRATE 50 MG: 50 TABLET ORAL at 09:28

## 2019-06-11 RX ADMIN — METOPROLOL TARTRATE 25 MG: 25 TABLET ORAL at 18:11

## 2019-06-11 RX ADMIN — SENNOSIDES,DOCUSATE SODIUM 2 TABLET: 8.6; 5 TABLET, FILM COATED ORAL at 13:05

## 2019-06-11 RX ADMIN — Medication: at 09:00

## 2019-06-11 RX ADMIN — PRAVASTATIN SODIUM 20 MG: 40 TABLET ORAL at 09:27

## 2019-06-11 RX ADMIN — HEPARIN SODIUM 5000 UNITS: 5000 INJECTION INTRAVENOUS; SUBCUTANEOUS at 09:27

## 2019-06-11 RX ADMIN — ACETAMINOPHEN 650 MG: 325 TABLET ORAL at 18:11

## 2019-06-11 RX ADMIN — HYDROXYZINE HYDROCHLORIDE 10 MG: 10 TABLET, FILM COATED ORAL at 22:51

## 2019-06-11 RX ADMIN — SENNOSIDES,DOCUSATE SODIUM 2 TABLET: 8.6; 5 TABLET, FILM COATED ORAL at 18:11

## 2019-06-11 RX ADMIN — MULTIPLE VITAMINS W/ MINERALS TAB 1 TABLET: TAB at 09:28

## 2019-06-11 RX ADMIN — CLINDAMYCIN HYDROCHLORIDE 600 MG: 150 CAPSULE ORAL at 22:51

## 2019-06-11 RX ADMIN — OXYCODONE HYDROCHLORIDE 2.5 MG: 5 TABLET ORAL at 15:50

## 2019-06-11 RX ADMIN — Medication: at 18:22

## 2019-06-11 RX ADMIN — CLINDAMYCIN HYDROCHLORIDE 600 MG: 150 CAPSULE ORAL at 13:09

## 2019-06-11 RX ADMIN — Medication 10 ML: at 13:07

## 2019-06-11 RX ADMIN — AMLODIPINE BESYLATE 5 MG: 5 TABLET ORAL at 09:27

## 2019-06-11 RX ADMIN — ACETAMINOPHEN 650 MG: 325 TABLET ORAL at 13:05

## 2019-06-11 RX ADMIN — OMEGA-3 FATTY ACIDS CAP 1000 MG 1 CAPSULE: 1000 CAP at 18:11

## 2019-06-11 RX ADMIN — HYDROCHLOROTHIAZIDE 25 MG: 25 TABLET ORAL at 09:28

## 2019-06-11 RX ADMIN — FUROSEMIDE 20 MG: 10 INJECTION, SOLUTION INTRAMUSCULAR; INTRAVENOUS at 13:02

## 2019-06-11 RX ADMIN — LISINOPRIL 10 MG: 5 TABLET ORAL at 09:27

## 2019-06-11 RX ADMIN — POLYETHYLENE GLYCOL 3350 17 G: 17 POWDER, FOR SOLUTION ORAL at 09:31

## 2019-06-11 RX ADMIN — OMEGA-3 FATTY ACIDS CAP 1000 MG 1 CAPSULE: 1000 CAP at 09:27

## 2019-06-11 RX ADMIN — TRAZODONE HYDROCHLORIDE 50 MG: 50 TABLET ORAL at 22:51

## 2019-06-11 RX ADMIN — HYDROCODONE BITARTRATE AND ACETAMINOPHEN 1 TABLET: 5; 325 TABLET ORAL at 09:30

## 2019-06-11 RX ADMIN — AZITHROMYCIN MONOHYDRATE 500 MG: 500 INJECTION, POWDER, LYOPHILIZED, FOR SOLUTION INTRAVENOUS at 13:02

## 2019-06-11 RX ADMIN — ASPIRIN 81 MG 81 MG: 81 TABLET ORAL at 09:28

## 2019-06-11 RX ADMIN — Medication 10 ML: at 22:51

## 2019-06-11 NOTE — PROGRESS NOTES
Problem: Mobility Impaired (Adult and Pediatric)  Goal: *Acute Goals and Plan of Care (Insert Text)  Description  Physical Therapy Goals  Initiated 6/10/2019  1. Patient will move from supine to sit and sit to supine  in bed with moderate assistance  within 7 day(s). 2.  Patient will transfer from bed to chair and chair to bed with minimal assistance/contact guard assist using the least restrictive device within 7 day(s). 3.  Patient will perform sit to stand with minimal assistance/contact guard assist within 7 day(s). 4.  Patient will ambulate with minimal assistance/contact guard assist for 50 feet with the least restrictive device within 7 day(s). Outcome: Progressing Towards Goal  PHYSICAL THERAPY TREATMENT  Patient: Reese Ha (8 y.o. female)  Date: 6/11/2019  Diagnosis: CAP (community acquired pneumonia) [J18.9] <principal problem not specified>       Precautions: Fall, Back  Chart, physical therapy assessment, plan of care and goals were reviewed. ASSESSMENT:  Pt Porsha Ann ambulates increased distance and requires minimal assistance for flat surface ambulation using RW. She continues to require up to maximum assistance for bed mobility and requires substantially increased time for all mobility. She follows all commands and offers good participation despite back pain and fatigue. Progression toward goals:  ?    Improving appropriately and progressing toward goals  ? Improving slowly and progressing toward goals  ? Not making progress toward goals and plan of care will be adjusted     PLAN:  Patient continues to benefit from skilled intervention to address the above impairments. Continue treatment per established plan of care. Discharge Recommendations:  Anurag Luna  Further Equipment Recommendations for Discharge:  defer to SNF      SUBJECTIVE:   Patient stated ? I got out of the bed yesterday. ?    Pt received supine, agreeable to PT and cleared by RN.    OBJECTIVE DATA SUMMARY:   Critical Behavior:  Neurologic State: Alert  Orientation Level: Oriented to person, Oriented to place, Disoriented to time, Disoriented to situation  Cognition: Follows commands, Poor safety awareness, Impaired decision making, Decreased attention/concentration  Safety/Judgement: Fall prevention  Functional Mobility Training:  Bed Mobility:  Rolling: Additional time; Moderate assistance  Supine to Sit: Additional time;Maximum assistance     Scooting: Additional time;Maximum assistance        Transfers:  Sit to Stand: Additional time;Minimum assistance;Assist x2  Stand to Sit: Assist x2; Additional time;Minimum assistance                             Balance:  Sitting: Impaired  Sitting - Static: Good (unsupported)  Sitting - Dynamic: Fair (occasional)  Standing: With support  Standing - Static: Fair  Standing - Dynamic : Fair  Ambulation/Gait Training:  Distance (ft): 13 Feet (ft)  Assistive Device: Walker, rolling;Gait belt  Ambulation - Level of Assistance: Minimal assistance        Gait Abnormalities: Decreased step clearance        Base of Support: Narrowed     Speed/Mikala: Pace decreased (<100 feet/min)                     Assistance for walker use around turns and obstacles. Pain:  Pain Scale 1: Numeric (0 - 10)  Pain Intensity 1: 5  Pain Location 1: Back     Pain Description 1: Aching;Cramping  Pain Intervention(s) 1: Medication (see MAR)  Activity Tolerance:   Limited by fatigue, pain. Please refer to the flowsheet for vital signs taken during this treatment. After treatment:   ?    Patient left in no apparent distress sitting up in chair  ? Patient left in no apparent distress in bed  ? Call bell left within reach  ? Nursing notified  ? Caregiver present  ? chair alarm activated    Reviewed log roll technique; pt adhering to back precautions throughout encounter.     COMMUNICATION/COLLABORATION:   The patient?s plan of care was discussed with: Occupational Therapist and Registered Nurse    Inderjit Michel, PT, DPT   Time Calculation: 18 mins

## 2019-06-11 NOTE — CONSULTS
Hematology Oncology Consultation    Reason for consult: Polycythemia    Admitting Diagnosis: CAP (community acquired pneumonia) [J18.9]    Impression:   79 yo female admitted with CAP, hypoxia, compression fracture found to have polycythemia and we have been asked to evaluate. *) Polycythemia:  - Review of old cbc shows she had had transient polycthemia in the past as well that resolved. - Polycythemia can be relative if a pt has volume depletion. You can have primary polycthemia from MPN such as Polycythemia Vera vs secondary polycythemia from hypoxic states such as GIL, COPD, Heart disease etc, or even from tumors that secrete Epo (Renal cell etc.)  - Given concern for pulmonary edema on MRI agree she likely isn't volume depleted enough to have polycythemia  - will check EPO, Steven-2 mutation, retic with tmw am labs. - given her current hypoxia this could be driving up her H/H as well. *) Osteoporotic Compression fracture:  - Likely to get Kyphoplasty    *) CAP, hypoxia:  - started zithromax per primary  - On 3L NC    Thank you for this kind referral, we will follow along with you    CC: \" I am feeling better. \"    Discussion: Lola Zamarripa is a  80y.o. year old seen in consultation at the request of Dr. Jacqueline Humphrey for polycythemia. Mrs Marichuy Felix is a 80 y.o. female with PMHx significant for arrhythmia, CAD, and hypertension, presents  to the ED with cc of lower back pain. Majority of hx from medical record. She had a fall last week and presented to ED with low back pain. MRI of  [facility] showed BL LLL consolidation, pulmonary edema, T11 compression fracture. She is on 3L NC, confused. Cbc shows polycythemia with Hbg 17.1/Hct 50.9. She is also getting a speech evaluation as well, underway. Imagin19 MRI T Spine:  1. Acute anterior compression fracture of the T11 vertebral body with  approximately 40% height loss and no retropulsion.   2. New mild chronic compression deformity of the T10 vertebral body with  approximately 20% height loss and no retropulsion. Stable chronic compression  deformity of the T12 vertebral body. 3. 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 4. Trace pleural effusions and bilateral lower lobe consolidative opacities,  which are now well evaluated by MRI. Apparent interlobular septal thickening  likely representing interstitial edema. 6/8/19 CT Head: No acute findings  6/8/19 CT A/P: 1. Osteopenia. Interval fractures of T10 and T11. The T11 fracture appears acute  2. Essentially unchanged splenic artery aneurysm  3. Otherwise no acute abnormality of the abdomen and pelvis    Labs:    No results found for this or any previous visit (from the past 24 hour(s)). History:  Past Medical History:   Diagnosis Date    Angina     has had mi    Arrhythmia     Cancer ([facility] [address]     basal cell removed form back    Chronic pain     fibromyalgia    Hypertension     Insomnia     Thyroid disease       Past Surgical History:   Procedure Laterality Date    HX CHOLECYSTECTOMY      HX CORONARY STENT PLACEMENT      HX OTHER SURGICAL      basal cell removed from back      Prior to Admission medications    Medication Sig Start Date End Date Taking? Authorizing Provider   diazePAM (VALIUM) 2 mg tablet Take  by mouth every six (6) hours as needed for Anxiety. Yes Other, MD Luana   aspirin 81 mg chewable tablet Take 81 mg by mouth daily. Yes Other, MD Luana   cyanocobalamin (VITAMIN B12) 1,000 mcg/mL injection 1,000 mcg by IntraMUSCular route every thirty (30) days. Yes Provider, Historical   hydroCHLOROthiazide (HYDRODIURIL) 25 mg tablet Take 25 mg by mouth daily. Yes Provider, Historical   metoprolol tartrate (LOPRESSOR) 50 mg tablet Take 50 mg by mouth every morning.    Yes Provider, Historical   metoprolol tartrate (LOPRESSOR) 50 mg tablet Take 25 mg by mouth every evening. Yes Provider, Historical   acetaminophen (TYLENOL) 325 mg tablet Take 325 mg by mouth every six (6) hours as needed for Pain. Yes Provider, Historical   nitroglycerin (NITROSTAT) 0.4 mg SL tablet 0.4 mg by SubLINGual route every five (5) minutes as needed for Chest Pain. Up to 3 doses. Yes Provider, Historical   multivitamin (ONE A DAY) tablet Take 1 Tab by mouth daily. Yes Provider, Historical   amLODIPine (NORVASC) 5 mg tablet Take 5 mg by mouth daily. Yes Provider, Historical   simvastatin (ZOCOR) 10 mg tablet Take 10 mg by mouth daily. Yes Provider, Historical   nitroglycerin (NITRODUR) 0.2 mg/hr 1 Patch by TransDERmal route daily. Yes Other, MD Luana   trazodone (DESYREL) 50 mg tablet Take 50 mg by mouth nightly. Yes Other, MD Luana   Fish Oil-Omega-3 Fatty Acids (FISH OIL) 360-1,200 mg Cap Take 1 Cap by mouth two (2) times a day. 12/2/10  Yes Provider, Historical     Allergies   Allergen Reactions    Ceftriaxone Hives    Benadryl [Diphenhydramine Hcl] Hives and Rash     Patient's son says she's not allergic    Celebrex [Celecoxib] Rash    Ciprofloxacin Rash    Ivp [Fd And C Blue No.1] Rash and Swelling    Macrobid [Nitrofurantoin Monohyd/M-Cryst] Swelling    Niaspan [Niacin] Palpitations    Pcn [Penicillins] Rash and Swelling    Seafood [Shellfish Containing Products] Rash and Swelling      Social History     Tobacco Use    Smoking status: Never Smoker    Smokeless tobacco: Never Used   Substance Use Topics    Alcohol use: No      History reviewed. No pertinent family history.      Current Medications:  Current Facility-Administered Medications   Medication Dose Route Frequency    furosemide (LASIX) injection 20 mg  20 mg IntraVENous ONCE    acetaminophen (TYLENOL) tablet 650 mg  650 mg Oral Q6H    oxyCODONE IR (ROXICODONE) tablet 2.5 mg  2.5 mg Oral Q4H PRN    oxyCODONE IR (ROXICODONE) tablet 5 mg  5 mg Oral Q4H PRN    senna-docusate (PERICOLACE) 8.6-50 mg per tablet 2 Tab  2 Tab Oral BID    fentaNYL citrate (PF) injection 25 mcg  25 mcg IntraVENous Q6H PRN    azithromycin (ZITHROMAX) 500 mg in 0.9% sodium chloride (MBP/ADV) 250 mL  500 mg IntraVENous Q24H    lidocaine 4 % patch 1 Patch  1 Patch TransDERmal Q24H    hydrocortisone (CORTAID) 1 % cream   Topical BID    sodium chloride (NS) flush 5-40 mL  5-40 mL IntraVENous Q8H    sodium chloride (NS) flush 5-40 mL  5-40 mL IntraVENous PRN    ondansetron (ZOFRAN) injection 4 mg  4 mg IntraVENous Q6H PRN    albuterol-ipratropium (DUO-NEB) 2.5 MG-0.5 MG/3 ML  3 mL Nebulization Q4H PRN    amLODIPine (NORVASC) tablet 5 mg  5 mg Oral DAILY    aspirin chewable tablet 81 mg  81 mg Oral DAILY    omega 3-DHA-EPA-fish oil 1,000 mg (120 mg-180 mg) capsule 1 Cap  1 Cap Oral BID    metoprolol tartrate (LOPRESSOR) tablet 50 mg  50 mg Oral 7am    metoprolol tartrate (LOPRESSOR) tablet 25 mg  25 mg Oral QPM    multivitamin, tx-iron-ca-min (THERA-M w/ IRON) tablet 1 Tab  1 Tab Oral DAILY    pravastatin (PRAVACHOL) tablet 20 mg  20 mg Oral DAILY    traZODone (DESYREL) tablet 50 mg  50 mg Oral QHS    hydrALAZINE (APRESOLINE) 20 mg/mL injection 10 mg  10 mg IntraVENous Q6H PRN    lisinopril (PRINIVIL, ZESTRIL) tablet 10 mg  10 mg Oral DAILY    polyethylene glycol (MIRALAX) packet 17 g  17 g Oral DAILY         Review of Systems:  Constitutional No fevers, chills, night sweats, excessive fatigue or weight loss. Allergic/Immunologic No recent allergic reactions   Eyes No significant visual difficulties. No diplopia. ENMT No problems with hearing, no sore throat, no sinus drainage. Endocrine No hot flashes or night sweats. No cold intolerance, polyuria, or polydipsia   Hematologic/Lymphatic No easy bruising or bleeding. The patient denies any tender or palpable lymph nodes   Breasts No abnormal masses of breast, nipple discharge or pain.    Respiratory No dyspnea on exertion, orthopnea, chest pain, cough or hemoptysis. Cardiovascular No anginal chest pain, irregular heart beat, tachycardia, palpitations or orthopnea. Gastrointestinal No nausea, vomiting, diarrhea, constipation, cramping, dysphagia, reflux, heartburn, GI bleeding, or early satiety. No change in bowel habits. Genitourinary (M) No hematuria, dysuria, increased frequency, urgency, hesitancy or incontinence. Musculoskeletal Lower back pain noted   Integumentary No chronic rashes, inflammation, ulcerations, pruritus, petechiae, purpura, ecchymoses, or skin changes. Neurologic No headache, blurred vision,   Psychiatric No insomnia       Physical Exam:  Constitutional Alert, cooperative, Mood and affect appropriate. Appears close to chronological age. Well nourished. Well developed. Head Normocephalic; no scars   Eyes Conjunctivae and sclerae are clear and without icterus. Pupils are reactive and equal.   ENMT Sinuses are nontender. No oral exudates, ulcers, masses, thrush or mucositis. Oropharynx clear. Tongue normal.   Neck Supple without masses or thyromegaly. No jugular venous distension. Hematologic/Lymphatic No petechiae or purpura. No tender or palpable lymph nodes in the cervical, supraclavicular, axillary or inguinal area. Respiratory Lungs are clear to auscultation without rhonchi or wheezing. Cardiovascular Regular rate and rhythm of heart without murmurs, gallops or rubs. Chest / Line Site Chest is symmetric with no chest wall deformities. Abdomen Non-tender, non-distended, no masses, ascites or hepatosplenomegaly. Good bowel sounds. No guarding or rebound tenderness. No pulsatile masses. Musculoskeletal No tenderness or swelling, normal range of motion without obvious weakness. Extremities No visible deformities, no cyanosis, clubbing or edema. Skin No rashes, scars, or lesions suggestive of malignancy. No petechiae, purpura, or ecchymoses. Neurologic No sensory or motor deficits.  Confused, not oriented to time   Psychiatric Alert and oriented times 2. Coherent speech.

## 2019-06-11 NOTE — PROGRESS NOTES
Problem: Self Care Deficits Care Plan (Adult)  Goal: *Acute Goals and Plan of Care (Insert Text)  Description  Occupational Therapy Goals:  Initiated 6/10/2019  1. Patient will perform grooming standing with supervision/set-up within 7 days. 2. Patient will perform lower body dressing with supervision/set-up with AE within 7 days. 3. Patient will perform bathing with supervision/set-up with AE within 7 days. 4. Patient will toileting with supervision/set up within 7 days. 5. Patient will transfer from toilet with supervision/set-up using the least restrictive device and appropriate durable medical equipment within 7 days. Outcome: Progressing Towards Goal  OCCUPATIONAL THERAPY TREATMENT  Patient: Tammi Cisneros (39 y.o. female)  Date: 6/11/2019  Diagnosis: CAP (community acquired pneumonia) [J18.9] <principal problem not specified>       Precautions: Fall, Back  Chart, occupational therapy assessment, plan of care, and goals were reviewed. ASSESSMENT:  Pt is making progress but is limited by back pain and general weakness. Reinforced log roll and pt needed moderate to max assist for bed mobility today. Good dynamic seated balance. Min assist x2 for sit to stand with increased effort and time. Initial posterior lean in standing and min assist and increased time to correct. Mobilized around bed with RW with min assist.  Stood for therapist to change depends with min assist for balance. Performed seated grooming (brushing of teeth) but pt reported fatigue and needed set up. Recommend SNF for rehab at discharge and pt will most likely need more assist at home after discharge from rehab. Back pain persists but kypho has been recommended. Pt was medicated by nursing for pain prior to session. Progression toward goals:  ?       Improving appropriately and progressing toward goals  ? Improving slowly and progressing toward goals  ?        Not making progress toward goals and plan of care will be adjusted     PLAN:  Patient continues to benefit from skilled intervention to address the above impairments. Continue treatment per established plan of care. Discharge Recommendations:  Anurag Luna  Further Equipment Recommendations for Discharge:  defer to SNf      SUBJECTIVE:   Patient stated ? I am tired. I don't know if I can do it. ?    OBJECTIVE DATA SUMMARY:   Cognitive/Behavioral Status:  Neurologic State: Alert  Orientation Level: Oriented to person;Oriented to place; Disoriented to time;Disoriented to situation  Cognition: Follows commands;Poor safety awareness; Impaired decision making;Decreased attention/concentration  Perception: Appears intact  Perseveration: No perseveration noted       Functional Mobility and Transfers for ADLs:  Bed Mobility:  Rolling: Additional time; Moderate assistance  Supine to Sit: Additional time;Maximum assistance  Scooting: Additional time;Maximum assistance    Transfers:  Sit to Stand: Additional time;Minimum assistance;Assist x2          Balance:  Sitting: Impaired  Sitting - Static: Good (unsupported)  Sitting - Dynamic: Fair (occasional)  Standing: With support  Standing - Static: Fair  Standing - Dynamic : Fair    ADL Intervention:       Grooming  Brushing Teeth: Stand-by assistance(seated at bedside chair)      Lower Body Dressing Assistance  Protective Undergarmet: Total assistance (dependent)      Pain:  Pain Scale 1: Numeric (0 - 10)  Pain Intensity 1: 5  Pain Location 1: Back     Pain Description 1: Aching;Cramping  Pain Intervention(s) 1: Medication (see MAR)  Activity Tolerance:     Please refer to the flowsheet for vital signs taken during this treatment. After treatment:   ? Patient left in no apparent distress sitting up in chair  ? Patient left in no apparent distress in bed  ? Call bell left within reach  ? Nursing notified  ? Caregiver present  ?  Bed alarm activated    COMMUNICATION/COLLABORATION:   The patient?s plan of care was discussed with: Physical Therapist, Registered Nurse and patient     Josiane Tan OTR/L  Time Calculation: 25 mins

## 2019-06-11 NOTE — PROGRESS NOTES
Speech Pathology bedside swallow evaluation/discharge  Patient: Severo Saint (34 y.o. female)  Date: 6/11/2019  Primary Diagnosis: CAP (community acquired pneumonia) [J18.9]       Precautions:  Fall, Back    ASSESSMENT :  Based on the objective data described below, the patient presents with functional swallow of thins and solids. Her mastication is mildly slow but complete and she clears her mouth well. She took successive sips of thins with weak cough once. Pharyngeal phase was grossly wnl. OX2. Given her advanced age and chronic health issues she is at risk to aspirate. Could complete MBS if desired. We would need an order to complete that test.   Skilled acute therapy provided by a speech-language pathologist is not indicated at this time. PLAN :  Recommendations:  Continue with current diet  Discharge Recommendations: None     SUBJECTIVE:   Patient stated she didn't have any trouble swallowing.      OBJECTIVE:     Past Medical History:   Diagnosis Date    Angina     has had mi    Arrhythmia     Cancer ([facility] [address]     basal cell removed form back    Chronic pain     fibromyalgia    Hypertension     Insomnia     Thyroid disease      Past Surgical History:   Procedure Laterality Date    HX CHOLECYSTECTOMY      HX CORONARY STENT PLACEMENT      HX OTHER SURGICAL      basal cell removed from back     Prior Level of Function/Home Situation:   Home Situation  Home Environment: Private residence  # Steps to Enter: 3  Rails to Enter: Yes  Wheelchair Ramp: Yes  One/Two Story Residence: One story  Living Alone: No  Support Systems: Family member(s)(Aunt)  Current DME Used/Available at Home: Louie Bene, rollator, Shower chair, Grab bars  Tub or Shower Type: Tub/Shower combination  Diet prior to admission:   Current Diet:  Reg/thins   Cognitive and Communication Status:  Neurologic State: Alert  Orientation Level: Oriented to person, Oriented to place, Disoriented to time, Disoriented to situation  Cognition: Follows commands, Poor safety awareness, Impaired decision making, Decreased attention/concentration  Perception: Appears intact  Perseveration: No perseveration noted  Safety/Judgement: Fall prevention  Oral Assessment:  Oral Assessment  Labial: No impairment  Dentition: Natural;Full  Lingual: No impairment  Velum: Other (comment)  Mandible: No impairment  P.O. Trials:  Patient Position: upright in bed  Vocal quality prior to P.O.: No impairment  Consistency Presented: Thin liquid; Solid  How Presented: Self-fed/presented; Successive swallows     Bolus Acceptance: No impairment  Bolus Formation/Control: Impaired  Type of Impairment: Delayed  Propulsion: Delayed (# of seconds); No impairment  Oral Residue: None  Initiation of Swallow: Delayed (# of seconds)  Laryngeal Elevation: Functional  Aspiration Signs/Symptoms: Delayed cough/throat clear(gently coughed once during eval.)  Pharyngeal Phase Characteristics: No impairment, issues, or problems              Oral Phase Severity: Mild-moderate  Pharyngeal Phase Severity : Mild  NOMS:   The NOMS functional outcome measure was used to quantify this patient's level of swallowing impairment. Based on the NOMS, the patient was determined to be at level 6 for swallow function     NOMS Swallowing Levels:  Level 1 (CN): NPO  Level 2 (CM): NPO but takes consistency in therapy  Level 3 (CL): Takes less than 50% of nutrition p.o. and continues with nonoral feedings; and/or safe with mod cues; and/or max diet restriction  Level 4 (CK): Safe swallow but needs mod cues; and/or mod diet restriction; and/or still requires some nonoral feeding/supplements  Level 5 (CJ): Safe swallow with min diet restriction; and/or needs min cues  Level 6 (CI): Independent with p.o.; rare cues; usually self cues; may need to avoid some foods or needs extra time  Level 7 ([address] [place] [address]): Independent for all p.o.  REJI. (2003).  National Outcomes Measurement System (NOMS): Adult Speech-Language Pathology User's Guide.       Pain:  Pain Scale 1: Numeric (0 - 10)  Pain Intensity 1: 5  Pain Location 1: Back  After treatment:   [] Patient left in no apparent distress sitting up in chair  [x] Patient left in no apparent distress in bed  [x] Call bell left within reach  [x] Nursing notified  [] Caregiver present  [] Bed alarm activated    COMMUNICATION/EDUCATION:   The patients plan of care including findings, recommendations, and recommended diet changes were discussed with: Registered Nurse.  [] Posted safety precautions in patient's room. [x] Patient/family have participated as able and agree with findings and recommendations. [] Patient is unable to participate in plan of care at this time.     Thank you for this referral.  Nomi Pineda, SLP  Time Calculation: 10 mins

## 2019-06-11 NOTE — PROGRESS NOTES
**Consult Information**  Member Facility: [facility] Camryn Sanchez MRN: [id number]  Consult ID: [id number]  Facility Time Zone: ET  Date and Time of Consult: 06/10/2019 07:35:16 PM  Requesting Clinician: [phone]  Patient Name: Jumana Howe  YOB: 1927  Gender: Female    **Clinical Note**  Clinical Note: Pt has a lot of rashes, she has a cream for it, complaint of itching could you please order something.  Thank you    ordered one time dose of 10 mg of hydroxyzine

## 2019-06-11 NOTE — PROGRESS NOTES
Spiritual Care Partner Volunteer visited patient in Neuro on June 11, 2019.     Documented by:    RAY Webber, [facility], Chaplain VIKKI LAMA [facility] Paging Service  [phone])

## 2019-06-11 NOTE — PROGRESS NOTES
* No surgery found *  * No surgery found *  Bedside and Verbal shift change report given to Eula RN (oncoming nurse) by Danny Paredes RN (offgoing nurse). Report included the following information     Zone Phone:   [phone]      Significant changes during shift:  Complaint of itching atarax one time was given, son asking for the surgery explain that his mom said she does not want surgery, he stated he wants to talk to MD about it. Patient Kimberly Esquivel Enroughty  80 y.o.  6/8/2019 11:48 AM by Milan Carpenter MD. Anmol Ann was admitted from Home    Problem List    Patient Active Problem List    Diagnosis Date Noted    CAP (community acquired pneumonia) 06/08/2019    Compression fracture of L4 lumbar vertebra 10/29/2018    Closed left hip fracture ([facility] [address] 02/04/2016    CAD (coronary artery disease) 02/04/2016    HTN (hypertension) 02/04/2016     Past Medical History:   Diagnosis Date    Angina     has had mi    Arrhythmia     Cancer ([facility] [address]     basal cell removed form back    Chronic pain     fibromyalgia    Hypertension     Insomnia     Thyroid disease          Core Measures:    CVA: No No  CHF:No No  PNA:Yes Yes    Post Op Surgical (If Applicable):     Number times ambulated in hallway past shift:  0  Number of times OOB to chair past shift:   0  NG Tube: No  Incentive Spirometer: No  Drains: No   Volume  0  Dressing Present:  No  Flatus:  Not applicable    Activity Status:    OOB to Chair Yes  Ambulated this shift Yes   Bed Rest No    Supplemental O2: (If Applicable)    NC Yes  NRB No  Venti-mask No  On 2 Liters/min      LINES AND DRAINS:    Central Line? No   PICC LINE? No   Urinary Catheter? No   DVT prophylaxis:    DVT prophylaxis Med-   DVT prophylaxis SCD or MARK- No     Wounds: (If Applicable)    Wounds- No    Location multiple rashes    Patient Safety:    Falls Score Total Score: 5  Safety Level_______  Bed Alarm On? Yes  Sitter?  No    Plan for upcoming shift: safety, pain medication, to call orthopedic DR to talk to her son        Discharge Plan: Yes TBD    Active Consults:  IP CONSULT TO ORTHOPEDIC SURGERY  IP CONSULT TO VASCULAR SURGERY  IP CONSULT TO PALLIATIVE CARE - PROVIDER

## 2019-06-11 NOTE — PROGRESS NOTES
Hospitalist Progress Note    NAME: Zakia Osorio   :  1927   MRN:  [id number]       Assessment / Plan:  Possible pulmonary edema  BL lower lobe CAP POA   Hypoxic   Metabolic encephalopathy   -on 3 L O2 now, no home O2  Confused   Admits to dyspnea  MRI :Trace pleural effusions and bilateral lower lobe consolidative opacities,  which are now well evaluated by MRI. Apparent interlobular septal thickening  likely representing interstitial edema.  -dose of lasix IV today 20 mg; stop IVF   Holding home hctz  Chest xray in am   Echo   -speech eval  -AB: start zithromax ( allergic to CTX/ cipro/cn)pharma x 7 days    BC NTD   -O2 to keep brady >90%, wean as tolerated, assess for home O2 on DC    Osteoporotic Compression fracture causing inability to ambulate   Intractable pain   S/p Fall at home   -clinically: pain at upper back with lateralization to the right side  Poor historian/confused --> In my opinion pt cannot make complex medical decisions at this time. Her NOK will be her son Reynold Read. D/w Reynold Read today he wants to proceed with kyphoplasty. Medically is not stable for procedure today. Will re assess in am.   Holding heparin for possible procedure. NPO for possible procedure   -pain management: tylenol, scheduled; oxy prn   Aggressive bowel regiment to avoid constipation with pain meds  -seen by ortho: she does not want another kyphoplasty for these compression fractures  Recommend pain management, OOB as tolerated with back precautions   Follow up as needed ( seen by Dr Andria Castillo )  -PT: SNF  -MRI lumb:   1. No evidence of acute fracture in the lumbar spine. 2. Stable chronic compression deformity of L3. Stable chronic compression  deformity of L4 status post vertebroplasty. Stable chronic inferior endplate  compression deformity of L5.  3. Mild degenerative changes as detailed above. -MRI thoracic:  1.  Acute anterior compression fracture of the T11 vertebral body with  approximately 40% height loss and no retropulsion. 2. New mild chronic compression deformity of the T10 vertebral body with  approximately 20% height loss and no retropulsion. Stable chronic compression  deformity of the T12 vertebral body. 3. 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  -Head CT: negative     Polycythemia  -B12 517   -will ask hematology to see while      Splenic artery aneurysm  - seen by vascular: Stable from previous exam. She is not of child bearing age and it is less than 2cm.  No further vascular evaluation, procedure, or follow up needed.    -stable by CT      HTN/ CAD ( h/o MI with stents 1999)   -BP stable  -cont norvasc/ metoprolol/lisinopril unsure of dose start low dose    Rt elbow pain xrays negative for fracture  Bilateral  calf pain. Duplex Bl: negative for DVT    Fibromyalgia, trazodone for sleep   HLP, cont zocor     6/11: d/w son Demi Johansen all of the above over the phone. He wants to proceed with kyphoplasty. He is favor of DNR but pt on admission indicated Full code and now confused to ask about it again. Will continue to re address it with her when back to baseline. Code status: Full   NOK: son Mariam Lamp   Prophylaxis: Hep SQ - holding pending decision on kyphoplasty; SCD     Baseline: Patient states she lives in home and her sister and sister's family live with her. Her son lives nearby and he works. Pt used to ambulate without DME but had walker/cane at home. Pt is not driving.  Son providing transportation   Recommended Disposition:  Pending clinical progress   PT: SNF      Subjective:     Chief Complaint / Reason for Physician Visit: following back pain / HTN / compression fracture   Appears clinically very frail and weak  She keep saying \" I don't know\"   She wants to defer all her decisions to her son   She is not dyspneic at rest but admits to SOB   Pt is confused Very poor po intake     Discussed with RN events overnight. Review of Systems:  Symptom Y/N Comments  Symptom Y/N Comments   Fever/Chills n   Chest Pain n    Poor Appetite    Edema     Cough    Abdominal Pain n    Sputum    Joint Pain     SOB/REBOLLAR y   Pruritis/Rash     Nausea/vomit    Tolerating PT/OT     Diarrhea    Tolerating Diet     Constipation    Other       Could NOT obtain due to:      Objective:     VITALS:   Last 24hrs VS reviewed since prior progress note. Most recent are:  Patient Vitals for the past 24 hrs:   Temp Pulse Resp BP SpO2   06/11/19 0740 97.3 °F (36.3 °C) (!) 58 16 121/54 94 %   06/11/19 0352 98 °F (36.7 °C) 64 16 124/52 92 %   06/10/19 2240 97.9 °F (36.6 °C) (!) 55 16 111/45 96 %   06/10/19 1920 97.7 °F (36.5 °C) 62 16 124/49 96 %   06/10/19 1741  (!) 59  125/65    06/10/19 1506 97.4 °F (36.3 °C) 66 16 110/64 98 %   06/10/19 1346     96 %   06/10/19 1211 97.4 °F (36.3 °C) 68 16 112/61 94 %       Intake/Output Summary (Last 24 hours) at 6/11/2019 0952  Last data filed at 6/10/2019 1920  Gross per 24 hour   Intake 240 ml   Output    Net 240 ml        PHYSICAL EXAM:  General: WD, WN. Alert, cooperative, no acute distress. Appears very weak/frail. Mild dyspnea with conversation    EENT:  EOMI. Anicteric sclerae. MMM  Resp:  diminished BS bases bilaterally, no wheezing or rales. No accessory muscle use  CV:  Regular  rhythm,  No edema  GI:  Soft, Non distended, Non tender.  +Bowel sounds  Neurologic:  Alert and oriented X 2, normal speech,   Psych:   Fair/poor insight. Not anxious nor agitated  Skin:  No rashes.   No jaundice    Reviewed most current lab test results and cultures  YES  Reviewed most current radiology test results   YES  Review and summation of old records today    NO  Reviewed patient's current orders and MAR    YES  PMH/SH reviewed - no change compared to H&P  ________________________________________________________________________  Care Plan discussed with: Comments   Patient y    Family  y Son over the phone    RN y    Care Manager     Consultant                       y Multidiciplinary team rounds were held today with , nursing, pharmacist and clinical coordinator. Patient's plan of care was discussed; medications were reviewed and discharge planning was addressed. ________________________________________________________________________  Total NON critical care TIME:  45 Minutes    Total CRITICAL CARE TIME Spent:   Minutes non procedure based      Comments   >50% of visit spent in counseling and coordination of care     ________________________________________________________________________  Jaime Mcclellan MD     Procedures: see electronic medical records for all procedures/Xrays and details which were not copied into this note but were reviewed prior to creation of Plan. LABS:  I reviewed today's most current labs and imaging studies.   Pertinent labs include:  Recent Labs     06/09/19  0359 06/08/19  1428   WBC 10.8 10.3   HGB 17.1* 16.6*   HCT 50.9* 50.5*    301     Recent Labs     06/09/19  0359 06/08/19  1351   * 136   K 3.7 3.8    101   CO2 27 30   * 109*   BUN 9 9   CREA 0.63 0.57   CA 8.6 9.1   MG 1.9  --    ALB 3.3* 3.6   TBILI 0.9 0.6   SGOT 19 24   ALT 17 20       Signed: Jaime Mcclellan MD

## 2019-06-11 NOTE — PROGRESS NOTES
Spoke with patient re dcp and she is agreeable to go to snf for rehab. Spoke with son after obtaining her permission and he is in agreement. Choice given and they chose [facility]. Referral sent to [facility] and will await their response. Spoke with son and he states patient lives alone, that her aunt and sister past away a while ago.

## 2019-06-11 NOTE — PROGRESS NOTES
[facility] has accepted patient when medically stable. Patient and son aware and in agreement. FOC signed and placed on chart.

## 2019-06-12 ENCOUNTER — APPOINTMENT (OUTPATIENT)
Dept: INTERVENTIONAL RADIOLOGY/VASCULAR | Age: 84
DRG: 515 | End: 2019-06-12
Attending: HOSPITALIST
Payer: MEDICARE

## 2019-06-12 ENCOUNTER — APPOINTMENT (OUTPATIENT)
Dept: GENERAL RADIOLOGY | Age: 84
DRG: 515 | End: 2019-06-12
Attending: HOSPITALIST
Payer: MEDICARE

## 2019-06-12 ENCOUNTER — APPOINTMENT (OUTPATIENT)
Dept: NON INVASIVE DIAGNOSTICS | Age: 84
DRG: 515 | End: 2019-06-12
Attending: HOSPITALIST
Payer: MEDICARE

## 2019-06-12 LAB
ANION GAP SERPL CALC-SCNC: 6 MMOL/L (ref 5–15)
BUN SERPL-MCNC: 20 MG/DL (ref 6–20)
BUN/CREAT SERPL: 38 (ref 12–20)
CALCIUM SERPL-MCNC: 8.1 MG/DL (ref 8.5–10.1)
CHLORIDE SERPL-SCNC: 96 MMOL/L (ref 97–108)
CO2 SERPL-SCNC: 25 MMOL/L (ref 21–32)
CREAT SERPL-MCNC: 0.53 MG/DL (ref 0.55–1.02)
ERYTHROCYTE [DISTWIDTH] IN BLOOD BY AUTOMATED COUNT: 16.5 % (ref 11.5–14.5)
GLUCOSE SERPL-MCNC: 115 MG/DL (ref 65–100)
HCT VFR BLD AUTO: 49.2 % (ref 35–47)
HGB BLD-MCNC: 16.2 G/DL (ref 11.5–16)
MAGNESIUM SERPL-MCNC: 1.8 MG/DL (ref 1.6–2.4)
MCH RBC QN AUTO: 29.8 PG (ref 26–34)
MCHC RBC AUTO-ENTMCNC: 32.9 G/DL (ref 30–36.5)
MCV RBC AUTO: 90.6 FL (ref 80–99)
NRBC # BLD: 0 K/UL (ref 0–0.01)
NRBC BLD-RTO: 0 PER 100 WBC
PHOSPHATE SERPL-MCNC: 2.7 MG/DL (ref 2.6–4.7)
PLATELET # BLD AUTO: 271 K/UL (ref 150–400)
PMV BLD AUTO: 10.4 FL (ref 8.9–12.9)
POTASSIUM SERPL-SCNC: 3.2 MMOL/L (ref 3.5–5.1)
RBC # BLD AUTO: 5.43 M/UL (ref 3.8–5.2)
RETICS # AUTO: 0.11 M/UL (ref 0.02–0.08)
RETICS/RBC NFR AUTO: 2 % (ref 0.7–2.1)
SODIUM SERPL-SCNC: 127 MMOL/L (ref 136–145)
WBC # BLD AUTO: 9.8 K/UL (ref 3.6–11)

## 2019-06-12 PROCEDURE — 85027 COMPLETE CBC AUTOMATED: CPT

## 2019-06-12 PROCEDURE — 74011250637 HC RX REV CODE- 250/637: Performed by: HOSPITALIST

## 2019-06-12 PROCEDURE — 77030034842 HC TAMP SPN BN INFL EXP II KYPH -I

## 2019-06-12 PROCEDURE — 77030021782 HC SYS CEM CART DEL KYPH -C

## 2019-06-12 PROCEDURE — 83735 ASSAY OF MAGNESIUM: CPT

## 2019-06-12 PROCEDURE — 81270 JAK2 GENE: CPT

## 2019-06-12 PROCEDURE — 77030021783 HC SYS CEM DEL MEDT -D

## 2019-06-12 PROCEDURE — 0PS43ZZ REPOSITION THORACIC VERTEBRA, PERCUTANEOUS APPROACH: ICD-10-PCS | Performed by: STUDENT IN AN ORGANIZED HEALTH CARE EDUCATION/TRAINING PROGRAM

## 2019-06-12 PROCEDURE — 36415 COLL VENOUS BLD VENIPUNCTURE: CPT

## 2019-06-12 PROCEDURE — 74011000250 HC RX REV CODE- 250: Performed by: HOSPITALIST

## 2019-06-12 PROCEDURE — 65660000000 HC RM CCU STEPDOWN

## 2019-06-12 PROCEDURE — 97530 THERAPEUTIC ACTIVITIES: CPT

## 2019-06-12 PROCEDURE — 77010033678 HC OXYGEN DAILY

## 2019-06-12 PROCEDURE — 74011250636 HC RX REV CODE- 250/636: Performed by: HOSPITALIST

## 2019-06-12 PROCEDURE — 74011636320 HC RX REV CODE- 636/320: Performed by: STUDENT IN AN ORGANIZED HEALTH CARE EDUCATION/TRAINING PROGRAM

## 2019-06-12 PROCEDURE — 97116 GAIT TRAINING THERAPY: CPT

## 2019-06-12 PROCEDURE — 94760 N-INVAS EAR/PLS OXIMETRY 1: CPT

## 2019-06-12 PROCEDURE — 74011250636 HC RX REV CODE- 250/636: Performed by: STUDENT IN AN ORGANIZED HEALTH CARE EDUCATION/TRAINING PROGRAM

## 2019-06-12 PROCEDURE — 82668 ASSAY OF ERYTHROPOIETIN: CPT

## 2019-06-12 PROCEDURE — C1713 ANCHOR/SCREW BN/BN,TIS/BN: HCPCS

## 2019-06-12 PROCEDURE — 71045 X-RAY EXAM CHEST 1 VIEW: CPT

## 2019-06-12 PROCEDURE — 80048 BASIC METABOLIC PNL TOTAL CA: CPT

## 2019-06-12 PROCEDURE — 0PU43JZ SUPPLEMENT THORACIC VERTEBRA WITH SYNTHETIC SUBSTITUTE, PERCUTANEOUS APPROACH: ICD-10-PCS | Performed by: STUDENT IN AN ORGANIZED HEALTH CARE EDUCATION/TRAINING PROGRAM

## 2019-06-12 PROCEDURE — 84100 ASSAY OF PHOSPHORUS: CPT

## 2019-06-12 PROCEDURE — 85045 AUTOMATED RETICULOCYTE COUNT: CPT

## 2019-06-12 PROCEDURE — 77030003666 HC NDL SPINAL BD -A

## 2019-06-12 PROCEDURE — 99152 MOD SED SAME PHYS/QHP 5/>YRS: CPT

## 2019-06-12 PROCEDURE — 74011000250 HC RX REV CODE- 250: Performed by: STUDENT IN AN ORGANIZED HEALTH CARE EDUCATION/TRAINING PROGRAM

## 2019-06-12 PROCEDURE — 97535 SELF CARE MNGMENT TRAINING: CPT | Performed by: OCCUPATIONAL THERAPIST

## 2019-06-12 RX ORDER — KETOROLAC TROMETHAMINE 30 MG/ML
15 INJECTION, SOLUTION INTRAMUSCULAR; INTRAVENOUS
Status: DISCONTINUED | OUTPATIENT
Start: 2019-06-12 | End: 2019-06-12

## 2019-06-12 RX ORDER — BUPIVACAINE HYDROCHLORIDE 5 MG/ML
10 INJECTION, SOLUTION EPIDURAL; INTRACAUDAL ONCE
Status: COMPLETED | OUTPATIENT
Start: 2019-06-12 | End: 2019-06-12

## 2019-06-12 RX ORDER — POTASSIUM CHLORIDE 750 MG/1
40 TABLET, FILM COATED, EXTENDED RELEASE ORAL
Status: DISPENSED | OUTPATIENT
Start: 2019-06-12 | End: 2019-06-13

## 2019-06-12 RX ORDER — FENTANYL CITRATE 50 UG/ML
100 INJECTION, SOLUTION INTRAMUSCULAR; INTRAVENOUS
Status: DISCONTINUED | OUTPATIENT
Start: 2019-06-12 | End: 2019-06-12

## 2019-06-12 RX ORDER — LIDOCAINE HYDROCHLORIDE 20 MG/ML
20 INJECTION, SOLUTION INFILTRATION; PERINEURAL ONCE
Status: COMPLETED | OUTPATIENT
Start: 2019-06-12 | End: 2019-06-12

## 2019-06-12 RX ORDER — AZITHROMYCIN 250 MG/1
250 TABLET, FILM COATED ORAL
Status: DISCONTINUED | OUTPATIENT
Start: 2019-06-13 | End: 2019-06-13 | Stop reason: HOSPADM

## 2019-06-12 RX ORDER — MIDAZOLAM HYDROCHLORIDE 1 MG/ML
5 INJECTION, SOLUTION INTRAMUSCULAR; INTRAVENOUS
Status: DISCONTINUED | OUTPATIENT
Start: 2019-06-12 | End: 2019-06-12

## 2019-06-12 RX ORDER — SODIUM CHLORIDE 9 MG/ML
25 INJECTION, SOLUTION INTRAVENOUS CONTINUOUS
Status: DISCONTINUED | OUTPATIENT
Start: 2019-06-12 | End: 2019-06-12

## 2019-06-12 RX ADMIN — LIDOCAINE HYDROCHLORIDE 200 MG: 20 INJECTION, SOLUTION INFILTRATION; PERINEURAL at 13:10

## 2019-06-12 RX ADMIN — AZITHROMYCIN MONOHYDRATE 500 MG: 500 INJECTION, POWDER, LYOPHILIZED, FOR SOLUTION INTRAVENOUS at 10:50

## 2019-06-12 RX ADMIN — FENTANYL CITRATE 25 MCG: 50 INJECTION, SOLUTION INTRAMUSCULAR; INTRAVENOUS at 12:42

## 2019-06-12 RX ADMIN — Medication: at 10:57

## 2019-06-12 RX ADMIN — SODIUM BICARBONATE 2 ML: 0.2 INJECTION, SOLUTION INTRAVENOUS at 13:10

## 2019-06-12 RX ADMIN — ACETAMINOPHEN 650 MG: 325 TABLET ORAL at 00:19

## 2019-06-12 RX ADMIN — MIDAZOLAM 2 MG: 1 INJECTION INTRAMUSCULAR; INTRAVENOUS at 12:50

## 2019-06-12 RX ADMIN — MIDAZOLAM 3 MG: 1 INJECTION INTRAMUSCULAR; INTRAVENOUS at 12:37

## 2019-06-12 RX ADMIN — MIDAZOLAM 1 MG: 1 INJECTION INTRAMUSCULAR; INTRAVENOUS at 12:54

## 2019-06-12 RX ADMIN — BUPIVACAINE HYDROCHLORIDE 50 MG: 5 INJECTION, SOLUTION EPIDURAL; INTRACAUDAL; PERINEURAL at 13:11

## 2019-06-12 RX ADMIN — Medication 10 ML: at 05:24

## 2019-06-12 RX ADMIN — Medication 10 ML: at 14:57

## 2019-06-12 RX ADMIN — ACETAMINOPHEN 650 MG: 325 TABLET ORAL at 05:23

## 2019-06-12 RX ADMIN — IOPAMIDOL 15 ML: 612 INJECTION, SOLUTION INTRATHECAL at 13:11

## 2019-06-12 RX ADMIN — FENTANYL CITRATE 50 MCG: 50 INJECTION, SOLUTION INTRAMUSCULAR; INTRAVENOUS at 12:52

## 2019-06-12 RX ADMIN — FENTANYL CITRATE 50 MCG: 50 INJECTION, SOLUTION INTRAMUSCULAR; INTRAVENOUS at 12:38

## 2019-06-12 NOTE — PROGRESS NOTES
-Hematology / Oncology ([facility]) -  -Primary Oncologist- Rafia  -CC- \" I am sleepy\"    -S-  Sleeping after her Kyphoplasty, arousable but not wanting to chat. Denies pain currently. -O-      Patient Vitals for the past 24 hrs:   Temp Pulse Resp BP SpO2   06/12/19 1542    141/46    06/12/19 1529     94 %   06/12/19 1528  68 16 150/71 91 %   06/12/19 1350  67 14 165/65 93 %   06/12/19 1335  67 14 125/56 94 %   06/12/19 1320  68 12 150/56 96 %   06/12/19 1315  66 14 134/57 96 %   06/12/19 1310  70 12 138/68 93 %   06/12/19 1305  71 12 148/61 90 %   06/12/19 1300  69 12 151/68 96 %   06/12/19 1255  72 12 138/61 98 %   06/12/19 1250  72 14 150/66 100 %   06/12/19 1245  71 17 162/71 99 %   06/12/19 1240  (!) 103 16 167/61 100 %   06/12/19 1235  74 17 172/67 97 %   06/12/19 1155 97.8 °F (36.6 °C) 73 16 152/65 92 %   06/12/19 1108 97.8 °F (36.6 °C) 68 16 130/68 97 %   06/12/19 0729 97.2 °F (36.2 °C) 60 16 141/46 95 %   06/12/19 0325 97.5 °F (36.4 °C) 61 18 133/42 95 %   06/12/19 0127    160/67    06/12/19 0014 97.7 °F (36.5 °C) 65 16 (!) 202/74 99 %   06/11/19 1941 97.6 °F (36.4 °C) 63 18 134/50 96 %   06/11/19 1811  65  112/75      No intake/output data recorded. ROS: 12 point ROS obtained and negative other than stated in HPI    Physical Exam:  Constitutional Sleepy but arousable, cooperative, Mood and affect appropriate. Appears close to chronological age. Well nourished. Well developed. Head Normocephalic; no scars   Eyes Conjunctivae and sclerae are clear and without icterus. Pupils are reactive and equal.   ENMT Sinuses are nontender. No oral exudates, ulcers, masses, thrush or mucositis. Oropharynx clear. Tongue normal.   Neck Supple without masses or thyromegaly. No jugular venous distension. Hematologic/Lymphatic No petechiae or purpura. No tender or palpable lymph nodes in the cervical, supraclavicular, axillary or inguinal area.    Respiratory Lungs are clear to auscultation without rhonchi or wheezing. Cardiovascular Regular rate and rhythm of heart without murmurs, gallops or rubs. Chest / Line Site Chest is symmetric with no chest wall deformities. Abdomen Non-tender, non-distended, no masses, ascites or hepatosplenomegaly. Good bowel sounds. No guarding or rebound tenderness. No pulsatile masses. Musculoskeletal No tenderness or swelling, normal range of motion without obvious weakness. Extremities No visible deformities, no cyanosis, clubbing or edema. Skin No rashes, scars, or lesions suggestive of malignancy. No petechiae, purpura, or ecchymoses. Neurologic No sensory or motor deficits. Confused, not oriented to time   Psychiatric Alert and oriented times 2. Coherent speech.                     -Labs-    Recent Labs     06/12/19  0323   WBC 9.8   HGB 16.2*      *   K 3.2*   *   BUN 20   CREA 0.53*   CA 8.1*   MG 1.8   PHOS 2.7       -Imaging-   6/9/19 MRI T Spine:  1. Acute anterior compression fracture of the T11 vertebral body with  approximately 40% height loss and no retropulsion. 2. New mild chronic compression deformity of the T10 vertebral body with  approximately 20% height loss and no retropulsion. Stable chronic compression  deformity of the T12 vertebral body. 3. 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 4. Trace pleural effusions and bilateral lower lobe consolidative opacities,  which are now well evaluated by MRI. Apparent interlobular septal thickening  likely representing interstitial edema.     6/8/19 CT Head: No acute findings  6/8/19 CT A/P: 1. Osteopenia. Interval fractures of T10 and T11. The T11 fracture appears acute  2. Essentially unchanged splenic artery aneurysm  3.  Otherwise no acute abnormality of the abdomen and pelvis       -Assessment + Plan-     *) 79 yo female admitted with CAP, hypoxia, compression fracture found to have polycythemia and we have been asked to evaluate.     *) Polycythemia:  - Review of old cbc shows she had had transient polycthemia in the past as well that resolved. - Polycythemia can be relative if a pt has volume depletion. You can have primary polycthemia from MPN such as Polycythemia Vera vs secondary polycythemia from hypoxic states such as GIL, COPD, Heart disease etc, or even from tumors that secrete Epo (Renal cell etc.)  - Given concern for pulmonary edema on MRI agree she likely isn't volume depleted enough to have polycythemia.  - H/H mildly improved today  -  EPO, Steven-2 mutation pending, if sent home I will have her FU with me in ~3 weeks  - given her current hypoxia this could be driving up her H/H as well.        *) Osteoporotic Compression fracture:  -  Kyphoplasty done today     *) CAP, hypoxia:  - started zithromax per primary  - weaned off O2 today, sating ~93% while ambulating

## 2019-06-12 NOTE — PROGRESS NOTES
Problem: Self Care Deficits Care Plan (Adult)  Goal: *Acute Goals and Plan of Care (Insert Text)  Description  Occupational Therapy Goals:  Initiated 6/10/2019  1. Patient will perform grooming standing with supervision/set-up within 7 days. 2. Patient will perform lower body dressing with supervision/set-up with AE within 7 days. 3. Patient will perform bathing with supervision/set-up with AE within 7 days. 4. Patient will toileting with supervision/set up within 7 days. 5. Patient will transfer from toilet with supervision/set-up using the least restrictive device and appropriate durable medical equipment within 7 days. Outcome: Progressing Towards Goal   OCCUPATIONAL THERAPY TREATMENT  Patient: Chauncey Ricci (8 y.o. female)  Date: 6/12/2019  Diagnosis: CAP (community acquired pneumonia) [J18.9] <principal problem not specified>       Precautions: Fall, Back  Chart, occupational therapy assessment, plan of care, and goals were reviewed. ASSESSMENT:  Pt was weaned to room air prior to session and O2 sat on room air was 93% with ADL activity. Continue to have memory loss and is very slow moving. Mobilized to bathroom with RW with increased effort and time with CGA. Max assist for depends management during toileting but pt was able to void on commode. Min assist sit to stand from standard commode and pt was able to wash front livia areas with min assist for balance. Returned to bedside chair. Pt will have Kypho today. Continue to recommend SNF at discharge and pt will need increased assist at home once rehab is complete if mentation does not improve. Progression toward goals:  ?       Improving appropriately and progressing toward goals  ? Improving slowly and progressing toward goals  ? Not making progress toward goals and plan of care will be adjusted     PLAN:  Patient continues to benefit from skilled intervention to address the above impairments.   Continue treatment per established plan of care. Discharge Recommendations:  Anurag Luna  Further Equipment Recommendations for Discharge:  defer to rehab      SUBJECTIVE:   Patient stated ? Unless you have this you have no idea how much it hurts. ? compression fractures (pt was medicated for pain prior to session)    OBJECTIVE DATA SUMMARY:   Cognitive/Behavioral Status:  Neurologic State: Alert  Orientation Level: Oriented to person;Disoriented to time;Disoriented to situation  Cognition: Decreased attention/concentration;Decreased command following;Memory loss  Perception: Appears intact  Perseveration: No perseveration noted  Safety/Judgement: Decreased insight into deficits    Functional Mobility and Transfers for ADLs:  Bed Mobility:  Rolling: Stand-by assistance; Additional time  Supine to Sit: Stand-by assistance; Additional time  Scooting: Stand-by assistance; Additional time    Transfers:  Sit to Stand: Minimum assistance; Additional time  Functional Transfers  Bathroom Mobility: Contact guard assistance(with RW )  Toilet Transfer : Minimum assistance(standard commode )  Bed to Chair: Contact guard assistance;Minimum assistance    Balance:  Sitting: Impaired; With support  Sitting - Static: Good (unsupported)  Sitting - Dynamic: Fair (occasional)  Standing: Impaired; With support  Standing - Static: Good  Standing - Dynamic : Fair    ADL Intervention:         Toileting  Bladder Hygiene: Contact guard assistance(standing)  Clothing Management: Maximum assistance  Cues: Physical assistance for pants down;Physical assistance for pants up; Tactile cues provided;Verbal cues provided    Cognitive Retraining  Safety/Judgement: Decreased insight into deficits    Pain:  Pain Scale 1: Numeric (0 - 10)  Pain Intensity 1: moderate with activity reduces with rest  Pain Location 1: Back  Pain Orientation 1: Medial  Pain Description 1: Aching  Pain Intervention(s) 1: Medication (see MAR)  Activity Tolerance:     Please refer to the flowsheet for vital signs taken during this treatment. After treatment:   ? Patient left in no apparent distress sitting up in chair  ? Patient left in no apparent distress in bed  ? Call bell left within reach  ? Nursing notified  ? Caregiver present  ?  Bed alarm activated    COMMUNICATION/COLLABORATION:   The patient?s plan of care was discussed with: Physical Therapist, Registered Nurse and patient     Katrin Burris OTR/L  Time Calculation: 15 mins

## 2019-06-12 NOTE — PROGRESS NOTES
Problem: Mobility Impaired (Adult and Pediatric)  Goal: *Acute Goals and Plan of Care (Insert Text)  Description  Physical Therapy Goals  Initiated 6/10/2019  1. Patient will move from supine to sit and sit to supine  in bed with moderate assistance  within 7 day(s). 2.  Patient will transfer from bed to chair and chair to bed with minimal assistance/contact guard assist using the least restrictive device within 7 day(s). 3.  Patient will perform sit to stand with minimal assistance/contact guard assist within 7 day(s). 4.  Patient will ambulate with minimal assistance/contact guard assist for 50 feet with the least restrictive device within 7 day(s). Outcome: Progressing Towards Goal   PHYSICAL THERAPY TREATMENT  Patient: Stephania Kelly (17 y.o. female)  Date: 6/12/2019  Diagnosis: CAP (community acquired pneumonia) [J18.9] <principal problem not specified>       Precautions: Fall, Back  Chart, physical therapy assessment, plan of care and goals were reviewed. ASSESSMENT:  Pt received supine in bed, agreeable and cleared by nursing for mobility. Pt required additional time for all mobility tasks. Performed bed mobility SBA and sit<>stand transfers to  Hellen. Pt ambulated into the restroom with forward flexed posture, narrow GENEVIEVE, decreased step clearance, and slow nuvia noted t/o. Demonstrated fair dynamic balance while performing self-care. Ambulated an additional 20' CGA to chair and required cues for hand placement upon sitting. Pt left sitting up in chair, bed alarm on and all needs met. Recommend SNF at discharge. Progression toward goals:  ?    Improving appropriately and progressing toward goals  ? Improving slowly and progressing toward goals  ? Not making progress toward goals and plan of care will be adjusted     PLAN:  Patient continues to benefit from skilled intervention to address the above impairments. Continue treatment per established plan of care.   Discharge Recommendations:  Skilled Nursing Facility  Further Equipment Recommendations for Discharge: Owns rollator      SUBJECTIVE:   Patient stated ? Where did Neptali go? Oh lord my back hurts. ?    OBJECTIVE DATA SUMMARY:   Critical Behavior:  Neurologic State: Alert  Orientation Level: Oriented to person  Cognition: Decreased attention/concentration, Decreased command following, Memory loss  Safety/Judgement: Decreased insight into deficits  Functional Mobility Training:  Bed Mobility:  Rolling: Stand-by assistance; Additional time  Supine to Sit: Stand-by assistance; Additional time     Scooting: Stand-by assistance; Additional time        Transfers:  Sit to Stand: Minimum assistance; Additional time  Stand to Sit: Minimum assistance; Additional time        Bed to Chair: Contact guard assistance;Minimum assistance                    Balance:  Sitting: Impaired; With support  Sitting - Static: Good (unsupported)  Sitting - Dynamic: Fair (occasional)  Standing: Impaired; With support  Standing - Static: Good  Standing - Dynamic : Fair  Ambulation/Gait Training:  Distance (ft): 40 Feet (ft)  Assistive Device: Gait belt;Walker, rolling  Ambulation - Level of Assistance: Contact guard assistance     Gait Description (WDL): Exceptions to WDL  Gait Abnormalities: Decreased step clearance(forward flexed posture)        Base of Support: Narrowed     Speed/Mikala: Slow  Step Length: Right shortened;Left shortened       Pain:  Pain Scale 1: Visual  Pain Intensity 1: 0  Pain Location 1: Back  Pain Orientation 1: Medial  Pain Description 1: Aching  Pain Intervention(s) 1: Medication (see MAR)  Activity Tolerance:   Fair; limited due to back pain  Please refer to the flowsheet for vital signs taken during this treatment. After treatment:   ?    Patient left in no apparent distress sitting up in chair  ? Patient left in no apparent distress in bed  ? Call bell left within reach  ? Nursing notified  ? Caregiver present  ?     Bed alarm activated    COMMUNICATION/COLLABORATION:   The patient?s plan of care was discussed with: Occupational Therapist, Registered Nurse and     Marianne Alas   Time Calculation: 28 mins    Regarding student involvement in patient care:  A student participated in this treatment session. Per CMS Medicare statements and APTA guidelines I certify that the following was true:  1. I was present and directly observed the entire session. 2. I made all skilled judgments and clinical decisions regarding care. 3. I am the practitioner responsible for assessment, treatment, and documentation.

## 2019-06-12 NOTE — PROGRESS NOTES
* No surgery found *  * No surgery found *  Bedside and Verbal shift change report given to Chico [address] (oncoming nurse) by Halima Ortiz RN (offgoing nurse). Report included the following information     Zone Phone:   [phone]      Significant changes during shift:  Pt with itching most of the time, given atarax PRN, confused pull IV out      Patient Information    Néstor Sheridan  80 y.o.  6/8/2019 11:48 AM by Denise Mejia MD. Emilie Ann was admitted from Home    Problem List    Patient Active Problem List    Diagnosis Date Noted    CAP (community acquired pneumonia) 06/08/2019    Compression fracture of L4 lumbar vertebra 10/29/2018    Closed left hip fracture ([facility] [address] 02/04/2016    CAD (coronary artery disease) 02/04/2016    HTN (hypertension) 02/04/2016     Past Medical History:   Diagnosis Date    Angina     has had mi    Arrhythmia     Cancer ([facility] [address]     basal cell removed form back    Chronic pain     fibromyalgia    Hypertension     Insomnia     Thyroid disease          Core Measures:    CVA: No No  CHF:No No  PNA:Yes Yes    Post Op Surgical (If Applicable):     Number times ambulated in hallway past shift:  0  Number of times OOB to chair past shift:   0  NG Tube: No  Incentive Spirometer: No  Drains: No   Volume  0  Dressing Present:  No  Flatus:  Not applicable    Activity Status:    OOB to Chair Yes  Ambulated this shift Yes   Bed Rest No    Supplemental O2: (If Applicable)    NC Yes  NRB No  Venti-mask No  On 2 Liters/min      LINES AND DRAINS:    Central Line? No   PICC LINE? No   Urinary Catheter? No   DVT prophylaxis:    DVT prophylaxis Med-   DVT prophylaxis SCD or MARK- No     Wounds: (If Applicable)    Wounds- No    Location multiple rashes    Patient Safety:    Falls Score Total Score: 4  Safety Level_______  Bed Alarm On? Yes  Sitter?  No    Plan for upcoming shift: safety, pain medication,    Discharge Plan: Yes TBD    Active Consults:  IP CONSULT TO ORTHOPEDIC SURGERY  IP CONSULT TO VASCULAR SURGERY  IP CONSULT TO PALLIATIVE CARE - PROVIDER  IP CONSULT TO HEMATOLOGY

## 2019-06-12 NOTE — PROGRESS NOTES
Back form kyphoplasty. Sleepy but easily arousal Knows name and birthday. Denies pain. Sats 90% on r/a while still sleepy. O2 at 2l/m placed until pt fully wakes up. Sats 94% on 2l.  Bandaids dry a dn intact on back

## 2019-06-12 NOTE — FACE TO FACE
Attempted Echo at 4:48pm, Patient was not compliant so nurse asked to push Echo off till the next day

## 2019-06-12 NOTE — PROGRESS NOTES
* No surgery found *  * No surgery found *  Bedside and Verbal shift change report given to [facility] (oncoming nurse) by Swathi Rosenthal RN (offgoing nurse). Report included the following information     Zone Phone:   [phone]      Significant changes during shift:  Kyphoplasty done. NPO all am.      Patient Information    My Burns  80 y.o.  6/8/2019 11:48 AM by Gisele Scruggs MD. Katrin Ann was admitted from Home    Problem List    Patient Active Problem List    Diagnosis Date Noted    CAP (community acquired pneumonia) 06/08/2019    Compression fracture of L4 lumbar vertebra 10/29/2018    Closed left hip fracture ([facility] [address] 02/04/2016    CAD (coronary artery disease) 02/04/2016    HTN (hypertension) 02/04/2016     Past Medical History:   Diagnosis Date    Angina     has had mi    Arrhythmia     Cancer ([facility] [address]     basal cell removed form back    Chronic pain     fibromyalgia    Hypertension     Insomnia     Thyroid disease          Core Measures:    CVA: No No  CHF:No No  PNA:Yes Yes    Post Op Surgical (If Applicable):     Number times ambulated in hallway past shift:  0  Number of times OOB to chair past shift:   0  NG Tube: No  Incentive Spirometer: No  Drains: No   Volume  0  Dressing Present:  No  Flatus:  Not applicable    Activity Status:    OOB to Chair Yes  Ambulated this shift Yes   Bed Rest No    Supplemental O2: (If Applicable)    NC Yes  NRB No  Venti-mask No  On 2 Liters/min until fully awake then room air if tolerates      LINES AND DRAINS:    Central Line? No   PICC LINE? No   Urinary Catheter? No   DVT prophylaxis:    DVT prophylaxis Med-   DVT prophylaxis SCD or MARK- No     Wounds: (If Applicable)    Wounds- No    Location multiple rashes    Patient Safety:    Falls Score Total Score: 5  Safety Level_______  Bed Alarm On? Yes  Sitter? No    Plan for upcoming shift: safety, pain medication,GIve daily meds when patient more awake. PEr IR pt may be OOB after 4 pm  Discharge Plan:  Yes TBD    Active Consults:  IP CONSULT TO ORTHOPEDIC SURGERY  IP CONSULT TO VASCULAR SURGERY  IP CONSULT TO PALLIATIVE CARE - PROVIDER  IP CONSULT TO HEMATOLOGY

## 2019-06-12 NOTE — PROGRESS NOTES
Problem: Falls - Risk of  Goal: *Absence of Falls  Description  Document Josey Zavala Fall Risk and appropriate interventions in the flowsheet. Outcome: Progressing Towards Goal     Problem: Patient Education: Go to Patient Education Activity  Goal: Patient/Family Education  Outcome: Progressing Towards Goal     Problem: Pressure Injury - Risk of  Goal: *Prevention of pressure injury  Description  Document Blade Scale and appropriate interventions in the flowsheet.   Outcome: Progressing Towards Goal     Problem: Patient Education: Go to Patient Education Activity  Goal: Patient/Family Education  Outcome: Progressing Towards Goal     Problem: Patient Education: Go to Patient Education Activity  Goal: Patient/Family Education  Outcome: Progressing Towards Goal     Problem: Patient Education: Go to Patient Education Activity  Goal: Patient/Family Education  Outcome: Progressing Towards Goal

## 2019-06-12 NOTE — PROGRESS NOTES
Hospitalist Progress Note    NAME: Stepan Jolly   :  1927   MRN:  [id number]       Assessment / Plan:  Possible pulmonary edema  BL lower lobe CAP POA   Hypoxia resolved   Metabolic encephalopathy POA improving   -clinically looking better today   Worked with PT, no dyspnea noted, O2 sat stable on RA at rest /ambulation   cxray :   1. Unchanged trace left pleural effusion and left basilar heterogeneous opacity,  which may represent atelectasis or infection/aspiration. 2. Unchanged diffuse bilateral interstitial opacities, which may represent  interstitial edema or underlying interstitial lung disease.  -s/p lasix 20 mg  for MRI findings --> Na down to 127   Hold off lasix for now  Likely has underlying pulmonary changes   Hold home HCTZ   -follow echo   -speech:regular diet   -AB: started zithromax + clindamycin  ( allergic to CTX/ cipro/PCN)  x 7 days    BC NTD   -MRI :Trace pleural effusions and bilateral lower lobe consolidative opacities,  which are now well evaluated by MRI. Apparent interlobular septal thickening  likely representing interstitial edema. Osteoporotic Compression fracture causing inability to ambulate   Intractable pain   S/p Fall at home   -clinically: pain at upper back with lateralization to the right side  Clinically stable for procedure, consulted IR   Jayshree historian/confused --> In my opinion pt cannot make complex medical decisions at this time. Her NOK will be her son Tobias Lugo. D/w Tobias Serve today he wants to proceed with kyphoplasty. Holding heparin for possible procedure  -pain management: tylenol, scheduled; oxy prn   Aggressive bowel regiment to avoid constipation with pain meds  -seen by ortho: she does not want another kyphoplasty for these compression fractures  Recommend pain management, OOB as tolerated with back precautions   Follow up as needed ( seen by Dr Idalmis Brink )  -PT: SNF  -MRI lumb:   1. No evidence of acute fracture in the lumbar spine.    2. Stable chronic compression deformity of L3. Stable chronic compression  deformity of L4 status post vertebroplasty. Stable chronic inferior endplate  compression deformity of L5.  3. Mild degenerative changes as detailed above. -MRI thoracic:  1. Acute anterior compression fracture of the T11 vertebral body with  approximately 40% height loss and no retropulsion. 2. New mild chronic compression deformity of the T10 vertebral body with  approximately 20% height loss and no retropulsion. Stable chronic compression  deformity of the T12 vertebral body. 3. 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 She is not of child bearing age and it is less than 2cm.  No further vascular evaluation, procedure, or follow up needed.    -stable by CT      HTN/ CAD ( h/o MI with stents 1999)   -BP stable  -cont norvasc/ metoprolol/lisinopril     Rt elbow pain xrays negative for fracture  Bilateral  calf pain. Duplex Bl: negative for DVT    Fibromyalgia, trazodone for sleep   HLP, cont zocor     6/11: d/w son Nahid Baumann all of the above over the phone. He wants to proceed with kyphoplasty. He is favor of DNR but pt on admission indicated Full code and now confused to ask about it again. Will continue to re address it with her when back to baseline. Code status: Full   NOK: son Pattie Right   Prophylaxis: Hep SQ - holding pending decision on kyphoplasty; SCD     Baseline: Patient states she lives in home and her sister and sister's family live with her. Her son lives nearby and he works. Pt used to ambulate without DME but had walker/cane at home. Pt is not driving.  Son providing transportation Recommended Disposition: Kyphoplasty today; SNF 1-2 days pending clinical progress, [facility] ; pending echo   PT: SNF      Subjective:     Chief Complaint / Reason for Physician Visit: following back pain / HTN / compression fracture   Clinically looks better today   Observed pt when she was working with PT/OT, no dyspnea noted     Discussed with RN events overnight. Review of Systems:  Symptom Y/N Comments  Symptom Y/N Comments   Fever/Chills n   Chest Pain n    Poor Appetite    Edema     Cough    Abdominal Pain n    Sputum    Joint Pain     SOB/REBOLLAR n   Pruritis/Rash     Nausea/vomit    Tolerating PT/OT     Diarrhea    Tolerating Diet     Constipation    Other       Could NOT obtain due to:      Objective:     VITALS:   Last 24hrs VS reviewed since prior progress note. Most recent are:  Patient Vitals for the past 24 hrs:   Temp Pulse Resp BP SpO2   06/12/19 1155 97.8 °F (36.6 °C) 73 16 152/65 92 %   06/12/19 1108 97.8 °F (36.6 °C) 68 16 130/68 97 %   06/12/19 0729 97.2 °F (36.2 °C) 60 16 141/46 95 %   06/12/19 0325 97.5 °F (36.4 °C) 61 18 133/42 95 %   06/12/19 0127    160/67    06/12/19 0014 97.7 °F (36.5 °C) 65 16 (!) 202/74 99 %   06/11/19 1941 97.6 °F (36.4 °C) 63 18 134/50 96 %   06/11/19 1811  65  112/75        Intake/Output Summary (Last 24 hours) at 6/12/2019 1218  Last data filed at 6/11/2019 2220  Gross per 24 hour   Intake 480 ml   Output    Net 480 ml        PHYSICAL EXAM:  General: WD, WN. Alert, cooperative, no acute distress. Appears very weak/frail. No dyspnea   EENT:  EOMI. Anicteric sclerae. MMM  Resp:  diminished BS bases bilaterally, no wheezing or rales. No accessory muscle use  CV:  Regular  rhythm,  No edema  GI:  Soft, Non distended, Non tender.  +Bowel sounds  Neurologic:  Alert and oriented X 2, normal speech,   Psych:   Fair/poor insight. Not anxious nor agitated  Skin:  No rashes.   No jaundice    Reviewed most current lab test results and cultures  YES  Reviewed most current radiology test results   YES  Review and summation of old records today    NO  Reviewed patient's current orders and MAR    YES  PMH/SH reviewed - no change compared to H&P  ________________________________________________________________________  Care Plan discussed with:    Comments   Patient y    Family      RN y    Care Manager y    Consultant                       y Multidiciplinary team rounds were held today with , nursing, pharmacist and clinical coordinator. Patient's plan of care was discussed; medications were reviewed and discharge planning was addressed. ________________________________________________________________________  Total NON critical care TIME:  35 Minutes    Total CRITICAL CARE TIME Spent:   Minutes non procedure based      Comments   >50% of visit spent in counseling and coordination of care     ________________________________________________________________________  Esau Patel MD     Procedures: see electronic medical records for all procedures/Xrays and details which were not copied into this note but were reviewed prior to creation of Plan. LABS:  I reviewed today's most current labs and imaging studies.   Pertinent labs include:  Recent Labs     06/12/19  0323   WBC 9.8   HGB 16.2*   HCT 49.2*        Recent Labs     06/12/19  0323   *   K 3.2*   CL 96*   CO2 25   *   BUN 20   CREA 0.53*   CA 8.1*   MG 1.8   PHOS 2.7       Signed: Esau Patel MD

## 2019-06-12 NOTE — PROGRESS NOTES
Bedside and Verbal shift change report given to [facility] (oncoming nurse) by Namita Gonzalez (offgoing nurse). Report included the following information      Zone Phone:   [phone]        Significant changes during shift:  Pt with itching most of the time, given atarax PRN, confused pull IV out. New IV initiated during shift.        Patient Information     Shaji Huntley  80 y.o.  6/8/2019 11:48 AM by Ilir Lira MD. Carolina Ann was admitted from Home     Problem List          Patient Active Problem List     Diagnosis Date Noted    CAP (community acquired pneumonia) 06/08/2019    Compression fracture of L4 lumbar vertebra 10/29/2018    Closed left hip fracture ([facility] [address] 02/04/2016    CAD (coronary artery disease) 02/04/2016    HTN (hypertension) 02/04/2016      Past Medical History:   Diagnosis Date    Angina       has had mi    Arrhythmia      Cancer ([facility] [address]       basal cell removed form back    Chronic pain       fibromyalgia    Hypertension      Insomnia      Thyroid disease              Core Measures:     CVA: No No  CHF:No No  PNA:Yes Yes     Post Op Surgical (If Applicable):      Number times ambulated in hallway past shift:  0  Number of times OOB to chair past shift:   0  NG Tube: No  Incentive Spirometer: No  Drains: No   Volume  0  Dressing Present:  No  Flatus:  Not applicable     Activity Status:     OOB to Chair Yes  Ambulated this shift Yes   Bed Rest No     Supplemental O2: (If Applicable)     NC Yes  NRB No  Venti-mask No  On 2 Liters/min        LINES AND DRAINS:     Central Line? No   PICC LINE? No   Urinary Catheter? No   DVT prophylaxis:     DVT prophylaxis Med-   DVT prophylaxis SCD or MARK- No      Wounds: (If Applicable)     Wounds- No     Location multiple rashes     Patient Safety:     Falls Score Total Score: 4  Safety Level_______  Bed Alarm On? Yes  Sitter?  No     Plan for upcoming shift: safety, pain medication, Possible kyphoplasty ?, echo, chest xray     Discharge Plan: Yes TBD     Active Consults:  IP CONSULT TO ORTHOPEDIC SURGERY  IP CONSULT TO VASCULAR SURGERY  IP CONSULT TO PALLIATIVE CARE - PROVIDER  IP CONSULT TO HEMATOLOGY

## 2019-06-12 NOTE — PROGRESS NOTES
Medicare pt has received, reviewed, and signed 2nd IM letter informing them of their right to appeal the discharge. Signed copy has been placed on pt bedside chart.                   Farrukh Kirby  [phone]

## 2019-06-12 NOTE — ROUTINE PROCESS
Pt pulled out IV prior to my shift. Was told by previous nurse that this may be the 2-3x that pt pulled out IV. Writer will hold off on starting new IV until morning labs

## 2019-06-13 ENCOUNTER — APPOINTMENT (OUTPATIENT)
Dept: NON INVASIVE DIAGNOSTICS | Age: 84
DRG: 515 | End: 2019-06-13
Attending: HOSPITALIST
Payer: MEDICARE

## 2019-06-13 VITALS
TEMPERATURE: 98.5 F | DIASTOLIC BLOOD PRESSURE: 76 MMHG | WEIGHT: 123 LBS | HEART RATE: 75 BPM | RESPIRATION RATE: 18 BRPM | SYSTOLIC BLOOD PRESSURE: 156 MMHG | HEIGHT: 59 IN | OXYGEN SATURATION: 95 % | BODY MASS INDEX: 24.8 KG/M2

## 2019-06-13 LAB
ANION GAP SERPL CALC-SCNC: 6 MMOL/L (ref 5–15)
BUN SERPL-MCNC: 11 MG/DL (ref 6–20)
BUN/CREAT SERPL: 22 (ref 12–20)
CALCIUM SERPL-MCNC: 8.2 MG/DL (ref 8.5–10.1)
CHLORIDE SERPL-SCNC: 100 MMOL/L (ref 97–108)
CO2 SERPL-SCNC: 27 MMOL/L (ref 21–32)
CREAT SERPL-MCNC: 0.51 MG/DL (ref 0.55–1.02)
ECHO AV AREA PEAK VELOCITY: 1.6 CM2
ECHO AV AREA VTI: 1.8 CM2
ECHO AV MEAN GRADIENT: 3 MMHG
ECHO AV PEAK GRADIENT: 5.4 MMHG
ECHO AV PEAK VELOCITY: 115.82 CM/S
ECHO AV REGURGITANT PHT: 625.4 CM
ECHO AV VTI: 24.89 CM
ECHO EST RA PRESSURE: 10 MMHG
ECHO LA MAJOR AXIS: 3.43 CM
ECHO LV INTERNAL DIMENSION DIASTOLIC: 3.85 CM (ref 3.9–5.3)
ECHO LV INTERNAL DIMENSION SYSTOLIC: 2.43 CM
ECHO LV IVSD: 1.14 CM (ref 0.6–0.9)
ECHO LV MASS 2D: 162.2 G (ref 67–162)
ECHO LV MASS INDEX 2D: 108.1 G/M2 (ref 43–95)
ECHO LV POSTERIOR WALL DIASTOLIC: 1.1 CM (ref 0.6–0.9)
ECHO LVOT DIAM: 1.35 CM
ECHO LVOT PEAK GRADIENT: 6.7 MMHG
ECHO LVOT PEAK VELOCITY: 129.76 CM/S
ECHO LVOT SV: 44.6 ML
ECHO LVOT VTI: 31.32 CM
ECHO MV A VELOCITY: 95.08 CM/S
ECHO MV AREA PHT: 3.7 CM2
ECHO MV E VELOCITY: 80.12 CM/S
ECHO MV E/A RATIO: 0.84
ECHO MV PRESSURE HALF TIME (PHT): 60.1 MS
ECHO PULMONARY ARTERY SYSTOLIC PRESSURE (PASP): 19.8 MMHG
ECHO PV MAX VELOCITY: 99.09 CM/S
ECHO PV PEAK GRADIENT: 3.9 MMHG
ECHO RIGHT VENTRICULAR SYSTOLIC PRESSURE (RVSP): 19.8 MMHG
ECHO TV REGURGITANT MAX VELOCITY: 156.22 CM/S
ECHO TV REGURGITANT PEAK GRADIENT: 9.8 MMHG
EPO SERPL-ACNC: 7.1 MIU/ML (ref 2.6–18.5)
ERYTHROCYTE [DISTWIDTH] IN BLOOD BY AUTOMATED COUNT: 17.1 % (ref 11.5–14.5)
GLUCOSE SERPL-MCNC: 139 MG/DL (ref 65–100)
HCT VFR BLD AUTO: 49 % (ref 35–47)
HGB BLD-MCNC: 16.3 G/DL (ref 11.5–16)
MCH RBC QN AUTO: 29.9 PG (ref 26–34)
MCHC RBC AUTO-ENTMCNC: 33.3 G/DL (ref 30–36.5)
MCV RBC AUTO: 89.7 FL (ref 80–99)
NRBC # BLD: 0 K/UL (ref 0–0.01)
NRBC BLD-RTO: 0 PER 100 WBC
PISA AR MAX VEL: 207.19 CM/S
PLATELET # BLD AUTO: 265 K/UL (ref 150–400)
PMV BLD AUTO: 10.2 FL (ref 8.9–12.9)
POTASSIUM SERPL-SCNC: 2.8 MMOL/L (ref 3.5–5.1)
RBC # BLD AUTO: 5.46 M/UL (ref 3.8–5.2)
SODIUM SERPL-SCNC: 133 MMOL/L (ref 136–145)
WBC # BLD AUTO: 7.6 K/UL (ref 3.6–11)

## 2019-06-13 PROCEDURE — 74011250637 HC RX REV CODE- 250/637: Performed by: HOSPITALIST

## 2019-06-13 PROCEDURE — 97116 GAIT TRAINING THERAPY: CPT | Performed by: PHYSICAL THERAPIST

## 2019-06-13 PROCEDURE — 93306 TTE W/DOPPLER COMPLETE: CPT

## 2019-06-13 PROCEDURE — 36415 COLL VENOUS BLD VENIPUNCTURE: CPT

## 2019-06-13 PROCEDURE — 85027 COMPLETE CBC AUTOMATED: CPT

## 2019-06-13 PROCEDURE — 74011250637 HC RX REV CODE- 250/637: Performed by: INTERNAL MEDICINE

## 2019-06-13 PROCEDURE — 80048 BASIC METABOLIC PNL TOTAL CA: CPT

## 2019-06-13 RX ORDER — DIAZEPAM 2 MG/1
2 TABLET ORAL
Qty: 5 TAB | Refills: 0 | Status: SHIPPED | OUTPATIENT
Start: 2019-06-13

## 2019-06-13 RX ORDER — AZITHROMYCIN 250 MG/1
250 TABLET, FILM COATED ORAL
Qty: 6 TAB | Refills: 0 | Status: SHIPPED
Start: 2019-06-13 | End: 2019-06-15

## 2019-06-13 RX ORDER — CLINDAMYCIN HYDROCHLORIDE 300 MG/1
600 CAPSULE ORAL 3 TIMES DAILY
Qty: 8 CAP | Refills: 0 | Status: SHIPPED
Start: 2019-06-13 | End: 2019-06-16

## 2019-06-13 RX ORDER — POTASSIUM CHLORIDE 1500 MG/1
40 TABLET, FILM COATED, EXTENDED RELEASE ORAL
Qty: 2 TAB | Refills: 0 | Status: SHIPPED
Start: 2019-06-13 | End: 2019-06-14

## 2019-06-13 RX ORDER — POTASSIUM CHLORIDE 7.45 MG/ML
10 INJECTION INTRAVENOUS
Status: DISCONTINUED | OUTPATIENT
Start: 2019-06-13 | End: 2019-06-13

## 2019-06-13 RX ORDER — POTASSIUM CHLORIDE 750 MG/1
20 TABLET, FILM COATED, EXTENDED RELEASE ORAL
Status: DISCONTINUED | OUTPATIENT
Start: 2019-06-13 | End: 2019-06-13

## 2019-06-13 RX ORDER — POTASSIUM CHLORIDE 20 MEQ/1
20 TABLET, EXTENDED RELEASE ORAL 2 TIMES DAILY
Qty: 6 TAB | Refills: 0 | Status: SHIPPED
Start: 2019-06-13 | End: 2019-06-16

## 2019-06-13 RX ORDER — HYDROCORTISONE 1 %
CREAM (GRAM) TOPICAL 2 TIMES DAILY
Qty: 30 G | Refills: 0 | Status: SHIPPED
Start: 2019-06-13

## 2019-06-13 RX ORDER — POTASSIUM CHLORIDE 750 MG/1
40 TABLET, FILM COATED, EXTENDED RELEASE ORAL 2 TIMES DAILY
Status: DISCONTINUED | OUTPATIENT
Start: 2019-06-13 | End: 2019-06-13 | Stop reason: HOSPADM

## 2019-06-13 RX ADMIN — POLYETHYLENE GLYCOL 3350 17 G: 17 POWDER, FOR SOLUTION ORAL at 08:31

## 2019-06-13 RX ADMIN — Medication: at 10:47

## 2019-06-13 RX ADMIN — POTASSIUM CHLORIDE 40 MEQ: 750 TABLET, FILM COATED, EXTENDED RELEASE ORAL at 10:43

## 2019-06-13 RX ADMIN — LISINOPRIL 10 MG: 5 TABLET ORAL at 11:11

## 2019-06-13 RX ADMIN — OMEGA-3 FATTY ACIDS CAP 1000 MG 1 CAPSULE: 1000 CAP at 08:36

## 2019-06-13 RX ADMIN — AMLODIPINE BESYLATE 5 MG: 5 TABLET ORAL at 11:11

## 2019-06-13 RX ADMIN — CLINDAMYCIN HYDROCHLORIDE 600 MG: 150 CAPSULE ORAL at 08:32

## 2019-06-13 RX ADMIN — ACETAMINOPHEN 650 MG: 325 TABLET ORAL at 04:23

## 2019-06-13 RX ADMIN — PRAVASTATIN SODIUM 20 MG: 40 TABLET ORAL at 08:36

## 2019-06-13 RX ADMIN — SENNOSIDES,DOCUSATE SODIUM 2 TABLET: 8.6; 5 TABLET, FILM COATED ORAL at 08:36

## 2019-06-13 RX ADMIN — METOPROLOL TARTRATE 50 MG: 50 TABLET ORAL at 08:32

## 2019-06-13 RX ADMIN — ACETAMINOPHEN 650 MG: 325 TABLET ORAL at 11:14

## 2019-06-13 RX ADMIN — AZITHROMYCIN 250 MG: 250 TABLET, FILM COATED ORAL at 11:14

## 2019-06-13 RX ADMIN — MULTIPLE VITAMINS W/ MINERALS TAB 1 TABLET: TAB at 08:36

## 2019-06-13 RX ADMIN — Medication 1 CAPSULE: at 11:14

## 2019-06-13 RX ADMIN — ASPIRIN 81 MG 81 MG: 81 TABLET ORAL at 08:36

## 2019-06-13 NOTE — DISCHARGE SUMMARY
Hospitalist Discharge Summary     Patient ID:  Scarlet Ervin  [id number]  11 y.o.  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 Duplex Bl: negative for DVT    Fibromyalgia  HLP  Code status: Full       CONSULTATIONS:  IP CONSULT TO ORTHOPEDIC SURGERY  IP CONSULT TO VASCULAR SURGERY  IP CONSULT TO PALLIATIVE CARE - PROVIDER  IP CONSULT TO HEMATOLOGY    Excerpted HPI from H&P of Yennifer Chambers MD:    Breann Gatica is a 80 y.o. female with PMHx significant for arrhythmia, CAD, and hypertension, presents  to the ED with cc of lower back pain. S/p fall thsi week- 911 was called- no er visit  Still ambulates with walker/cane  But since fall much less. Her pain is located in the right flank and midline.  It is currently an 8 out of 10 in severity.  She denies incontinence, dysuria or diarrhea.  She denies numbness or tingling.  She denies shortness of breath or chest pain.  According to her son, she complained of abdominal discomfort earlier today. No n/v. No uti sx. She denies that currently, but does states she cannot remember the last time she had a bowel movement.  She denies fever or chills.  She denies lightheadedness or dizziness.  She also denies headache and neck pain. Patient states she is always had this.  She denies leg pain.     We were asked to admit for work up and evaluation of the above problems.      ______________________________________________________________________  DISCHARGE SUMMARY/HOSPITAL COURSE:  for full details see H&P, daily progress notes, labs, consult notes. Possible pulmonary edema on admission   BL lower lobe CAP POA   Hypoxia resolved   Metabolic encephalopathy POA improving likely on baseline dementia   -MS: pt was very confused last night but aao x 3 this am and cooperative  She refused to take PO K supplement yesterday   Off O2 with stable sats on RA   cxray 6/12:   1. Unchanged trace left pleural effusion and left basilar heterogeneous opacity,  which may represent atelectasis or infection/aspiration. 2. Unchanged diffuse bilateral interstitial opacities, which may represent  interstitial edema or underlying interstitial lung disease.  --> cxray findings are likely due to underlying chronic ILD   -s/p lasix 20 mg 6/11 for MRI findings --> Na was down to 127 --> up to 133 this am   ECHO: EF normal, no valvular abnormalities   DC on home hctz   -speech:regular diet   -AB: started zithromax + clindamycin 6/11 ( allergic to CTX/ cipro/PCN)  x 7 days    BC NTD   -MRI 6/9:Trace pleural effusions and bilateral lower lobe consolidative opacities,  which are now well evaluated by MRI.  Apparent interlobular septal thickening  likely representing interstitial edema.     Hypokalemia 2.8  -declined K supplement yesterday  -replaced this am --> will dc on 3 day supplement then follow BMP      Osteoporotic Compression fracture causing inability to ambulate   Intractable pain   S/p Fall at home   -clinically: no pain at this time   S/p Kyphoplasty by IR 6/12 with great result   -pain management: tylenol, scheduled; oxy prn   Aggressive bowel regiment to avoid constipation with pain meds  -seen by ortho: she does not want another kyphoplasty for these compression fractures  Recommend pain management, OOB as tolerated with back precautions   Follow up as needed ( seen by Dr Jessy Johnson )  -PT: SNF  -MRI lumb:   1. No evidence of acute fracture in the lumbar spine. 2. Stable chronic compression deformity of L3. Stable chronic compression  deformity of L4 status post vertebroplasty. Stable chronic inferior endplate  compression deformity of L5.  3. Mild degenerative changes as detailed above. -MRI thoracic:  1. Acute anterior compression fracture of the T11 vertebral body with  approximately 40% height loss and no retropulsion. 2. New mild chronic compression deformity of the T10 vertebral body with  approximately 20% height loss and no retropulsion. Stable chronic compression  deformity of the T12 vertebral body. 3. 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 She is not of child bearing age and it is less than 2cm.  No further vascular evaluation, procedure, or follow up needed.    -stable by CT      HTN/ CAD ( h/o MI with stents 1999)   -/150s, stable overall   -ECHO: normal EF follow   -cont home norvasc/ metoprolol/hctz / NTP     Chronic diffuse rash, per pt longstanding problem, cont steroids cream started here   Rt elbow pain xrays negative for fracture  Bilateral  calf pain. Duplex Bl: negative for DVT    Fibromyalgia/anxiety, trazodone for sleep, valium prn   HLP, cont zocor      6/11: d/w son Aretha Davis all of the above over the phone. He wants to proceed with kyphoplasty. He is favor of DNR but pt on admission indicated Full code and now confused to ask about it again. Will continue to re address it with her when back to baseline.    6/13: called to update, massage left how to reach me back with questions    Code status: Full   NOK: son Val Hammer   Prophylaxis: Hep SQ - was holding for kyphoplasty; SCD      Baseline: Patient states she lives in home and her sister and sister's family live with her. Tracey Gamboa son lives nearby and he works. Pt used to ambulate without DME but had walker/cane at home. Pt is not driving. Son providing transportation   Recommended Disposition: [facility]  PT: SNF         _______________________________________________________________________  Patient seen and examined by me on discharge day. PHYSICAL EXAM:  General:          WD, WN. Alert, cooperative, no acute distress. Appears very weak/frail. No dyspnea   EENT:              EOMI. Anicteric sclerae. MMM  Resp:               diminished BS bases bilaterally, no wheezing or rales. No accessory muscle use  CV:                  Regular  rhythm,  No edema  GI:                   Soft, Non distended, Non tender.  +Bowel sounds  Neurologic:      Alert and oriented X 2, normal speech,   Psych:             Fair/poor insight. Not anxious nor agitated  Skin:                No jaundice      _______________________________________________________________________  DISCHARGE MEDICATIONS:   Current Discharge Medication List      START taking these medications    Details   hydrocortisone (CORTAID) 1 % topical cream Apply  to affected area two (2) times a day. use thin layer, apply to affected areas  Qty: 30 g, Refills: 0      L. acidoph & paracasei- S therm- Bifido (ALFA-Q/RISAQUAD) 8 billion cell cap cap Take 1 Cap by mouth daily. Qty: 20 Cap, Refills: 0      potassium chloride SR (K-TAB) 20 mEq tablet Take 2 Tabs by mouth daily (with dinner) for 1 day. Take on 6/13  Qty: 2 Tab, Refills: 0      potassium chloride (K-DUR, KLOR-CON) 20 mEq tablet Take 1 Tab by mouth two (2) times a day for 3 days. Qty: 6 Tab, Refills: 0      azithromycin (ZITHROMAX) 250 mg tablet Take 1 Tab by mouth daily (with lunch) for 2 days.   Qty: 6 Tab, Refills: 0 clindamycin (CLEOCIN) 300 mg capsule Take 2 Caps by mouth three (3) times daily for 8 doses. Qty: 8 Cap, Refills: 0         CONTINUE these medications which have CHANGED    Details   diazePAM (VALIUM) 2 mg tablet Take 1 Tab by mouth every twelve (12) hours as needed for Anxiety. Max Daily Amount: 4 mg. Qty: 5 Tab, Refills: 0    Associated Diagnoses: Anxiety         CONTINUE these medications which have NOT CHANGED    Details   aspirin 81 mg chewable tablet Take 81 mg by mouth daily. cyanocobalamin (VITAMIN B12) 1,000 mcg/mL injection 1,000 mcg by IntraMUSCular route every thirty (30) days. hydroCHLOROthiazide (HYDRODIURIL) 25 mg tablet Take 25 mg by mouth daily. !! metoprolol tartrate (LOPRESSOR) 50 mg tablet Take 50 mg by mouth every morning. !! metoprolol tartrate (LOPRESSOR) 50 mg tablet Take 25 mg by mouth every evening. acetaminophen (TYLENOL) 325 mg tablet Take 325 mg by mouth every six (6) hours as needed for Pain. nitroglycerin (NITROSTAT) 0.4 mg SL tablet 0.4 mg by SubLINGual route every five (5) minutes as needed for Chest Pain. Up to 3 doses. multivitamin (ONE A DAY) tablet Take 1 Tab by mouth daily. amLODIPine (NORVASC) 5 mg tablet Take 5 mg by mouth daily. simvastatin (ZOCOR) 10 mg tablet Take 10 mg by mouth daily. nitroglycerin (NITRODUR) 0.2 mg/hr 1 Patch by TransDERmal route daily. trazodone (DESYREL) 50 mg tablet Take 50 mg by mouth nightly. Fish Oil-Omega-3 Fatty Acids (FISH OIL) 360-1,200 mg Cap Take 1 Cap by mouth two (2) times a day. !! - Potential duplicate medications found. Please discuss with provider. Patient Follow Up Instructions:    Activity: Activity as tolerated  Diet: Regular Diet  Wound Care: None needed      Follow-up Information     Follow up With Specialties Details Why Contact Info    [address] [facility]) [place] [address]  [address]      Pineda Wells MD [facility]   [address] [facility] [address]  [phone]      Bryn Gavin MD Hematology and Oncology In 3 weeks hematology; office will call with appoitnment; call office if didn;t hear from them  [address]  [phone]          ________________________________________________________________    Risk of deterioration: Low    Condition at Discharge:  Stable  __________________________________________________________________    Disposition  SNF/LTC    ____________________________________________________________________    Code Status: Full Code  ___________________________________________________________________      Total time in minutes spent coordinating this discharge (includes going over instructions, follow-up, prescriptions, and preparing report for sign off to her PCP) :  > 30  minutes    Signed:  Marnette Moritz, MD

## 2019-06-13 NOTE — PROGRESS NOTES
Problem: Mobility Impaired (Adult and Pediatric)  Goal: *Acute Goals and Plan of Care (Insert Text)  Description  Physical Therapy Goals  Initiated 6/10/2019  1. Patient will move from supine to sit and sit to supine  in bed with moderate assistance  within 7 day(s). 2.  Patient will transfer from bed to chair and chair to bed with minimal assistance/contact guard assist using the least restrictive device within 7 day(s). 3.  Patient will perform sit to stand with minimal assistance/contact guard assist within 7 day(s). 4.  Patient will ambulate with minimal assistance/contact guard assist for 50 feet with the least restrictive device within 7 day(s). Outcome: Progressing Towards Goal  PHYSICAL THERAPY TREATMENT  Patient: Néstor Sheridan (88 y.o. female)  Date: 6/13/2019  Diagnosis: CAP (community acquired pneumonia) [J18.9] <principal problem not specified>       Precautions: Fall, Back  Chart, physical therapy assessment, plan of care and goals were reviewed. ASSESSMENT:  Patient continues to make steady progress toward goals. Today needing SBA for bed mobility and CGA for transfers. Amb approx 110 feet with RW and CGA in hallway. Continues with decreased activity tolerance overall. Patient lives alone and would likely benefit from short SNF stay to progress to more independent level of function. Progression toward goals:  ?    Improving appropriately and progressing toward goals  ? Improving slowly and progressing toward goals  ? Not making progress toward goals and plan of care will be adjusted     PLAN:  Patient continues to benefit from skilled intervention to address the above impairments. Continue treatment per established plan of care. Discharge Recommendations:  Anurag Luna  Further Equipment Recommendations for Discharge:  TBD      SUBJECTIVE:   Patient stated ? I am very independent. ?    OBJECTIVE DATA SUMMARY:   Critical Behavior:  Neurologic State: Alert  Orientation Level: Disoriented to time, Oriented to person, Oriented to place  Cognition: Follows commands  Safety/Judgement: Decreased insight into deficits  Functional Mobility Training:  Bed Mobility:  Rolling: Stand-by assistance  Supine to Sit: Stand-by assistance     Scooting: Stand-by assistance        Transfers:  Sit to Stand: Contact guard assistance;Assist x2  Stand to Sit: Contact guard assistance;Assist x1                             Balance:  Sitting: Intact  Sitting - Static: Good (unsupported)  Sitting - Dynamic: Good (unsupported); Supported sitting  Standing: Impaired  Standing - Static: Good  Standing - Dynamic : Fair  Ambulation/Gait Training:  Distance (ft): 110 Feet (ft)  Assistive Device: Gait belt;Walker, rolling  Ambulation - Level of Assistance: Contact guard assistance;Assist x1        Gait Abnormalities: Decreased step clearance;Shuffling gait        Base of Support: Narrowed     Speed/Mikala: Pace decreased (<100 feet/min); Shuffled; Slow  Step Length: Left shortened;Right shortened       Pain:  Pain Scale 1: Numeric (0 - 10)  Pain Intensity 1: 0              Activity Tolerance:   VSS  Please refer to the flowsheet for vital signs taken during this treatment. After treatment:   ?    Patient left in no apparent distress sitting up in chair  ? Patient left in no apparent distress in bed  ? Call bell left within reach  ? Nursing notified  ? Caregiver present  ?     Chair alarm activated    COMMUNICATION/COLLABORATION:   The patient?s plan of care was discussed with: Physical Therapist, Occupational Therapist and Registered Nurse    Rimma Landeros PT, DPT   Time Calculation: 14 mins

## 2019-06-13 NOTE — ROUTINE PROCESS
Bedside and Verbal shift change report given to Melyssa Modi RN (oncoming nurse) by Jacinta Ahuja (offgoing nurse). Report included the following information  
  
Zone Phone:   [phone] 
  
  
Significant changes during shift:   refused 0000 tylenol c/o pain at 0430 given Tylenol at that time pt pulled out iv x 2 . Will ask MD if we can leave it out  
Patient Information 
  
Katherine Ann 80 y.o. 
6/8/2019 11:48 AM by Mariam Toledo MD. Katherine Ann was admitted from Home 
  
Problem List 
  
    
Patient Active Problem List  
  Diagnosis Date Noted  CAP (community acquired pneumonia) 06/08/2019  Compression fracture of L4 lumbar vertebra 10/29/2018  Closed left hip fracture ([facility] [address] 02/04/2016  CAD (coronary artery disease) 02/04/2016  
 HTN (hypertension) 02/04/2016  
  
    
Past Medical History:  
Diagnosis Date  Angina    
  has had mi  Arrhythmia    
 Cancer ([facility] [address]    
  basal cell removed form back  Chronic pain    
  fibromyalgia  Hypertension    
 Insomnia    
 Thyroid disease    
  
  
  
Core Measures: 
  
CVA: No No 
CHF:No No 
PNA:Yes Yes 
  
Post Op Surgical (If Applicable):  
  
Number times ambulated in hallway past shift:  0 Number of times OOB to chair past shift:   0 
NG Tube: No 
Incentive Spirometer: No 
Drains: No   Volume  0 Dressing Present:  No 
Flatus:  Not applicable 
  
Activity Status: 
  
OOB to Chair Yes Ambulated this shift Yes Bed Rest No 
  
Supplemental O2: (If Applicable) 
  
NC Yes NRB No 
Venti-mask No 
On 2 Liters/min until fully awake then room air if tolerates 
  
  
LINES AND DRAINS: 
  
Central Line? No  
PICC LINE? No  
Urinary Catheter? No  
DVT prophylaxis: 
  
DVT prophylaxis Med-  
DVT prophylaxis SCD or MARK- No  
  
Wounds: (If Applicable) 
  
Wounds- No 
  
Location multiple rashes 
  
Patient Safety: 
  
Falls Score Total Score: 5 Safety Level_______ Bed Alarm On? Yes Sitter?  No 
  
 Plan for upcoming shift: safety, pain medications if pt more cooperative Discharge Plan: Yes TBD 
  
Active Consults: 
IP CONSULT TO ORTHOPEDIC SURGERY 
IP CONSULT TO VASCULAR SURGERY 
IP CONSULT TO PALLIATIVE CARE - PROVIDER 
IP CONSULT TO HEMATOLOGY

## 2019-06-13 NOTE — PROGRESS NOTES
HANNA Plan--Letitia Care. Patient has a bed at [facility] today if medically stable. Md made aware. PCS initiated and given to nursing, will need discharge summary attached when discharged. Nursing to call report to [phone]. [facility] set up for 1400pm today . Good help ACO (MSSP) and Sound Physician Bundle Patient identification Sheet sent with patient. Called and informed son of patient's discharge and time of pickup from [facility].

## 2019-06-13 NOTE — PROGRESS NOTES
Report called for primary nurse to  Malou Gaston RN , pt is being transferred to [facility] by [facility] at 1400.

## 2019-06-13 NOTE — PROGRESS NOTES
Dr Curtis Vela notified of /63 after receiving metoprolol this am when BP 779L systolic.  She stated it is fine to give lisnoipril and norvasc

## 2019-06-13 NOTE — INTERDISCIPLINARY ROUNDS
Bedside interdisciplinary rounds were held today to discuss patient plan of care and outcomes. The following members were present: Physician, Nurse, Clinical Care Leader, Pharmacy, Physical Therapy, and Case Management. Plan: 
 
Probable DC to [facility] today, p/u scheduled, per Negar Palmer, for 2PM. Echo being completed, will call to get read prior to DC. Spoke with Johann in Echo , she will put her at the top to be read as soon as possible

## 2019-06-13 NOTE — PROGRESS NOTES
Spoke to DR Carmen Jasmine because pt did not take any medications,very confused , fighting,  remove the telemetry, MD aware that pt did not take the potassium, he stated he will order Geodon if needed and don't worry about meds now

## 2019-06-13 NOTE — DISCHARGE INSTRUCTIONS
HOSPITALIST DISCHARGE INSTRUCTIONS    NAME: Zakia Osorio   :  1927   MRN:  [id number]     Date/Time:  2019 1:34 PM    ADMIT DATE: 2019   DISCHARGE DATE: 2019     Attending Physician: Fred Clark MD    DISCHARGE DIAGNOSIS:    Compression fracture   HTN   CAD   CAP       Medications: Per above medication reconciliation. Pain Management: per above medications    Recommended diet: Regular Diet    Recommended activity: Activity as tolerated and PT/OT Eval and Treat    Wound care: apply cream for chronic rash     Indwelling devices:  None    Supplemental Oxygen: None    Required Lab work: Weekly BMP and Weekly CBC, follow potassium level start      Glucose management:  None    Code status: Full        Outside physician follow up: Follow-up Information     Follow up With Specialties Details Why Contact Info    [address] [place] [address] [address] [address]  Yarelis Cisse MD [facility]   [address] [place] [address] [address] [place] [address]      Yohannes Fontanez MD Hematology and Oncology In 3 weeks hematology; office will call with appoitnment; call office if didn;t hear from them  [address] [facility]  [address]  [facility].  [phone]                 Skilled nursing facility/ SNF MD responsible for above on discharge. Information obtained by :  I understand that if any problems occur once I am at home I am to contact my physician. I understand and acknowledge receipt of the instructions indicated above.                                                                                                                                            Physician's or R.N.'s Signature                                                                  Date/Time Patient or Repres

## 2019-06-13 NOTE — PROGRESS NOTES
CM  verified that patient has initial INPATIENT order written on 6/8/2019 at [facility]. Patient would be eligible for transfer to SNF on or after 6/11/2019. Patient also identified as Sound Bundle with PNA diagnosis. Guidelines sheet FAX'd to [address] [place] [address],[address] per protocol to identify patient. Copy of form placed on chart with patient's information to be sent to SNF.     Kalyn Cerad RN, BSN, [address] [place] [address]     [phone]

## 2019-06-13 NOTE — PROGRESS NOTES
Hospitalist Progress Note    NAME: Denise Gamez   :  1927   MRN:  [id number]       Assessment / Plan:  Possible pulmonary edema  BL lower lobe CAP POA   Hypoxia resolved   Metabolic encephalopathy POA improving likely on baseline dementia   -MS: pt was very confused last night but aao x 3 this am and cooperative  She refused to take PO K supplement yesterday   Off O2 with stable sats on RA   cxray :   1. Unchanged trace left pleural effusion and left basilar heterogeneous opacity,  which may represent atelectasis or infection/aspiration. 2. Unchanged diffuse bilateral interstitial opacities, which may represent  interstitial edema or underlying interstitial lung disease.  --> cxray findings are likely due to underlying chronic ILD   -s/p lasix 20 mg  for MRI findings --> Na was down to 127 --> up to 133 this am   ECHO not done yesterday due to pt was uncooperative/confused  will review to decide on diuretics    Holding home HCTZ for low Na   -speech:regular diet   -AB: started zithromax + clindamycin  ( allergic to CTX/ cipro/PCN)  x 7 days    BC NTD   -MRI :Trace pleural effusions and bilateral lower lobe consolidative opacities,  which are now well evaluated by MRI. Apparent interlobular septal thickening  likely representing interstitial edema. Hypokalemia 2.8  -declined K supplement yesterday  -replacing aggressively  -monitor     Osteoporotic Compression fracture causing inability to ambulate   Intractable pain   S/p Fall at home   -clinically: no pain at this time   S/p Kyphoplasty by IR  with great result   -pain management: tylenol, scheduled; oxy prn   Aggressive bowel regiment to avoid constipation with pain meds  -seen by ortho: she does not want another kyphoplasty for these compression fractures  Recommend pain management, OOB as tolerated with back precautions   Follow up as needed ( seen by Dr Vivek Stack )  -PT: SNF  -MRI lumb:   1.  No evidence of acute fracture in the lumbar spine. 2. Stable chronic compression deformity of L3. Stable chronic compression  deformity of L4 status post vertebroplasty. Stable chronic inferior endplate  compression deformity of L5.  3. Mild degenerative changes as detailed above. -MRI thoracic:  1. Acute anterior compression fracture of the T11 vertebral body with  approximately 40% height loss and no retropulsion. 2. New mild chronic compression deformity of the T10 vertebral body with  approximately 20% height loss and no retropulsion. Stable chronic compression  deformity of the T12 vertebral body. 3. 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 She is not of child bearing age and it is less than 2cm.  No further vascular evaluation, procedure, or follow up needed.    -stable by CT      HTN/ CAD ( h/o MI with stents 1999)   -BP high side    -follow echo to decide on meds adjustment: back on hctz vs lasix prn vs incr Norvasc    -cont norvasc/ metoprolol/lisinopril     Rt elbow pain xrays negative for fracture  Bilateral  calf pain. Duplex Bl: negative for DVT    Fibromyalgia, trazodone for sleep   HLP, cont zocor     6/11: d/w son Espinoza Sprinkle all of the above over the phone. He wants to proceed with kyphoplasty. He is favor of DNR but pt on admission indicated Full code and now confused to ask about it again. Will continue to re address it with her when back to baseline. Code status: Full   NOK: joey Jordan   Prophylaxis: Hep SQ - was holding for kyphoplasty; SCD     Baseline: Patient states she lives in home and her sister and sister's family live with her.   Her son lives nearby and he works. Pt used to ambulate without DME but had walker/cane at home. Pt is not driving. Son providing transportation   Recommended Disposition: SNF [facility] when ready;pending echo   PT: SNF      Subjective:     Chief Complaint / Reason for Physician Visit: following back pain / HTN / compression fracture   Pt was very confused last night. Back to baseline this am/ calm and cooperative   No dyspnea     Discussed with RN events overnight. Review of Systems:  Symptom Y/N Comments  Symptom Y/N Comments   Fever/Chills n   Chest Pain n    Poor Appetite    Edema     Cough    Abdominal Pain n    Sputum    Joint Pain     SOB/REBOLLAR n   Pruritis/Rash     Nausea/vomit    Tolerating PT/OT     Diarrhea    Tolerating Diet     Constipation    Other       Could NOT obtain due to:      Objective:     VITALS:   Last 24hrs VS reviewed since prior progress note.  Most recent are:  Patient Vitals for the past 24 hrs:   Temp Pulse Resp BP SpO2   06/13/19 0728 98 °F (36.7 °C) 80 18 153/71 92 %   06/13/19 0500    160/78    06/13/19 0405 97.3 °F (36.3 °C) 87 18 192/83 93 %   06/12/19 2256 98.7 °F (37.1 °C) 83 18 141/61 91 %   06/12/19 1550 97.6 °F (36.4 °C) 68 18 160/57 98 %   06/12/19 1542    141/46    06/12/19 1529     94 %   06/12/19 1528  68 16 150/71 91 %   06/12/19 1350  67 14 165/65 93 %   06/12/19 1335  67 14 125/56 94 %   06/12/19 1320  68 12 150/56 96 %   06/12/19 1315  66 14 134/57 96 %   06/12/19 1310  70 12 138/68 93 %   06/12/19 1305  71 12 148/61 90 %   06/12/19 1300  69 12 151/68 96 %   06/12/19 1255  72 12 138/61 98 %   06/12/19 1250  72 14 150/66 100 %   06/12/19 1245  71 17 162/71 99 %   06/12/19 1240  (!) 103 16 167/61 100 %   06/12/19 1235  74 17 172/67 97 %   06/12/19 1155 97.8 °F (36.6 °C) 73 16 152/65 92 %   06/12/19 1108 97.8 °F (36.6 °C) 68 16 130/68 97 %       Intake/Output Summary (Last 24 hours) at 6/13/2019 1012  Last data filed at 6/12/2019 1528  Gross per 24 hour   Intake 250 ml Output    Net 250 ml        PHYSICAL EXAM:  General: WD, WN. Alert, cooperative, no acute distress. Appears very weak/frail. No dyspnea   EENT:  EOMI. Anicteric sclerae. MMM  Resp:  diminished BS bases bilaterally, no wheezing or rales. No accessory muscle use  CV:  Regular  rhythm,  No edema  GI:  Soft, Non distended, Non tender.  +Bowel sounds  Neurologic:  Alert and oriented X 2, normal speech,   Psych:   Fair/poor insight. Not anxious nor agitated  Skin:  No rashes. No jaundice    Reviewed most current lab test results and cultures  YES  Reviewed most current radiology test results   YES  Review and summation of old records today    NO  Reviewed patient's current orders and MAR    YES  PMH/SH reviewed - no change compared to H&P  ________________________________________________________________________  Care Plan discussed with:    Comments   Patient y    Family      RN y    Care Manager y    Consultant                       y Multidiciplinary team rounds were held today with , nursing, pharmacist and clinical coordinator. Patient's plan of care was discussed; medications were reviewed and discharge planning was addressed. ________________________________________________________________________  Total NON critical care TIME:  35 Minutes    Total CRITICAL CARE TIME Spent:   Minutes non procedure based      Comments   >50% of visit spent in counseling and coordination of care     ________________________________________________________________________  Chloe Marquez MD     Procedures: see electronic medical records for all procedures/Xrays and details which were not copied into this note but were reviewed prior to creation of Plan. LABS:  I reviewed today's most current labs and imaging studies.   Pertinent labs include:  Recent Labs     06/13/19 0927 06/12/19 0323   WBC 7.6 9.8   HGB 16.3* 16.2*   HCT 49.0* 49.2*    271     Recent Labs     06/13/19 0927 06/12/19 0323   NA 133* 127*   K 2.8* 3.2*    96*   CO2 27 25   * 115*   BUN 11 20   CREA 0.51* 0.53*   CA 8.2* 8.1*   MG  --  1.8   PHOS  --  2.7       Signed: Anson Chaudhry MD

## 2019-06-13 NOTE — PROGRESS NOTES
* No surgery found *  * No surgery found *  Bedside and Verbal shift change report given to [address] (oncoming nurse) by Cyril Cuenca RN (offgoing nurse). Report included the following information     Zone Phone:   [phone]      Significant changes during shift:  Pt wake up very confused , uncooperative and fighting did not take any medication    Patient Information    Devjerry Rice  80 y.o.  6/8/2019 11:48 AM by Giovanni Encarnacion MD. Nick Ann was admitted from Home    Problem List    Patient Active Problem List    Diagnosis Date Noted    CAP (community acquired pneumonia) 06/08/2019    Compression fracture of L4 lumbar vertebra 10/29/2018    Closed left hip fracture ([facility] [address] 02/04/2016    CAD (coronary artery disease) 02/04/2016    HTN (hypertension) 02/04/2016     Past Medical History:   Diagnosis Date    Angina     has had mi    Arrhythmia     Cancer ([facility] [address]     basal cell removed form back    Chronic pain     fibromyalgia    Hypertension     Insomnia     Thyroid disease          Core Measures:    CVA: No No  CHF:No No  PNA:Yes Yes    Post Op Surgical (If Applicable):     Number times ambulated in hallway past shift:  0  Number of times OOB to chair past shift:   0  NG Tube: No  Incentive Spirometer: No  Drains: No   Volume  0  Dressing Present:  No  Flatus:  Not applicable    Activity Status:    OOB to Chair Yes  Ambulated this shift Yes   Bed Rest No    Supplemental O2: (If Applicable)    NC Yes  NRB No  Venti-mask No  On 2 Liters/min until fully awake then room air if tolerates      LINES AND DRAINS:    Central Line? No   PICC LINE? No   Urinary Catheter? No   DVT prophylaxis:    DVT prophylaxis Med-   DVT prophylaxis SCD or MARK- No     Wounds: (If Applicable)    Wounds- No    Location multiple rashes    Patient Safety:    Falls Score Total Score: 5  Safety Level_______  Bed Alarm On? Yes  Sitter? No    Plan for upcoming shift: safety, pain medications if pt more cooperative  Discharge Plan:  Yes TBD    Active Consults:  IP CONSULT TO ORTHOPEDIC SURGERY  IP CONSULT TO VASCULAR SURGERY  IP CONSULT TO PALLIATIVE CARE - PROVIDER  IP CONSULT TO HEMATOLOGY

## 2019-06-13 NOTE — PROGRESS NOTES
-Hematology / Oncology ([facility]) -  [facility]  -- \" I don't know what's over there\"    -S-  Laying in bed, states she is very fatigued, decreased appetite, no pain at this time. Will Anand-      Patient Vitals for the past 24 hrs:   Temp Pulse Resp BP SpO2   06/13/19 1138 98.5 °F (36.9 °C) 75 18 156/76 95 %   06/13/19 1034  74  126/63    06/13/19 0728 98 °F (36.7 °C) 80 18 153/71 92 %   06/13/19 0500    160/78    06/13/19 0405 97.3 °F (36.3 °C) 87 18 192/83 93 %   06/12/19 2256 98.7 °F (37.1 °C) 83 18 141/61 91 %   06/12/19 1550 97.6 °F (36.4 °C) 68 18 160/57 98 %   06/12/19 1542    141/46    06/12/19 1529     94 %   06/12/19 1528  68 16 150/71 91 %   06/12/19 1350  67 14 165/65 93 %   06/12/19 1335  67 14 125/56 94 %   06/12/19 1320  68 12 150/56 96 %   06/12/19 1315  66 14 134/57 96 %   06/12/19 1310  70 12 138/68 93 %   06/12/19 1305  71 12 148/61 90 %   06/12/19 1300  69 12 151/68 96 %   06/12/19 1255  72 12 138/61 98 %   06/12/19 1250  72 14 150/66 100 %   06/12/19 1245  71 17 162/71 99 %   06/12/19 1240  (!) 103 16 167/61 100 %   06/12/19 1235  74 17 172/67 97 %     No intake/output data recorded. ROS: 12 point ROS obtained and negative other than stated in HPI    Physical Exam:  Constitutional Awake, AAOx2 cooperative, Mood and affect appropriate. Appears close to chronological age. Well nourished. Well developed. Head Normocephalic; no scars   Eyes Conjunctivae and sclerae are clear and without icterus. Pupils are reactive and equal.   ENMT Sinuses are nontender. No oral exudates, ulcers, masses, thrush or mucositis. Oropharynx clear. Tongue normal.   Neck Supple without masses or thyromegaly. No jugular venous distension. Hematologic/Lymphatic No petechiae or purpura. No tender or palpable lymph nodes in the cervical, supraclavicular, axillary or inguinal area. Respiratory Lungs are clear to auscultation without rhonchi or wheezing.    Cardiovascular Regular rate and rhythm of heart without murmurs, gallops or rubs. Chest / Line Site Chest is symmetric with no chest wall deformities. Abdomen Non-tender, non-distended, no masses, ascites or hepatosplenomegaly. Good bowel sounds. No guarding or rebound tenderness. No pulsatile masses. Musculoskeletal No tenderness or swelling, normal range of motion without obvious weakness. Extremities No visible deformities, no cyanosis, clubbing or edema. Skin No rashes, scars, or lesions suggestive of malignancy. No petechiae, purpura, or ecchymoses. Neurologic No sensory or motor deficits. Confused, not oriented to time   Psychiatric Alert and oriented times 2. Coherent speech.                     -Labs-    Recent Labs     06/13/19  0927 06/12/19  0323   WBC 7.6 9.8   HGB 16.3* 16.2*    271   * 127*   K 2.8* 3.2*   * 115*   BUN 11 20   CREA 0.51* 0.53*   CA 8.2* 8.1*   MG  --  1.8   PHOS  --  2.7       -Imaging-   6/9/19 MRI T Spine:  1. Acute anterior compression fracture of the T11 vertebral body with  approximately 40% height loss and no retropulsion. 2. New mild chronic compression deformity of the T10 vertebral body with  approximately 20% height loss and no retropulsion. Stable chronic compression  deformity of the T12 vertebral body. 3. 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 4. Trace pleural effusions and bilateral lower lobe consolidative opacities,  which are now well evaluated by MRI. Apparent interlobular septal thickening  likely representing interstitial edema.     6/8/19 CT Head: No acute findings  6/8/19 CT A/P: 1. Osteopenia. Interval fractures of T10 and T11. The T11 fracture appears acute  2. Essentially unchanged splenic artery aneurysm  3.  Otherwise no acute abnormality of the abdomen and pelvis       -Assessment + Plan- *) 79 yo female admitted with CAP, hypoxia, compression fracture found to have polycythemia and we have been asked to evaluate.     *) Polycythemia:  - Review of old cbc shows she had had transient polycthemia in the past as well that resolved.   - H/H mildly improved today  -  EPO, Steven-2 mutation pending   - I will have her FU with me in ~3 weeks, I will have my staff schedule her an appt since leaving for [facility] today        *) Osteoporotic Compression fracture:  -  Kyphoplasty done 6/12/19     *) CAP, hypoxia:  - started zithromax per primary  - weaned off O2 today, sating ~93% while ambulating

## 2019-06-14 LAB
BACTERIA SPEC CULT: ABNORMAL
BACTERIA SPEC CULT: ABNORMAL
SERVICE CMNT-IMP: ABNORMAL

## 2019-06-17 LAB
BACKGROUND: 489207: ABNORMAL
DIRECTOR REVIEW: 489204: ABNORMAL
JAK2 P.V617F BLD/T QL: ABNORMAL

## 2019-06-20 ENCOUNTER — PATIENT OUTREACH (OUTPATIENT)
Dept: CASE MANAGEMENT | Age: 84
End: 2019-06-20

## 2019-06-27 ENCOUNTER — PATIENT OUTREACH (OUTPATIENT)
Dept: CASE MANAGEMENT | Age: 84
End: 2019-06-27

## 2019-07-04 ENCOUNTER — PATIENT OUTREACH (OUTPATIENT)
Dept: CASE MANAGEMENT | Age: 84
End: 2019-07-04

## 2019-07-04 NOTE — PROGRESS NOTES
Community Care Team Documentation for Patient in [facility]  Subsequent Follow up     Patient remains at [facility] ([facility]). See previous [facility] Team notes. PCP : Merritt Stewart MD    Spoke with SNF team.  PT/OT continue. Currently ambulating 100-200ft supervision with walker, 4 steps BLHR SBA, upper body ADLs supervision , lower body ADLs CGA, transfers supervision. Plan to discharge 7/9/19 either LTC at [facility] or home alone with hired caregivers. Medicaid application pending. Medications were not reconciled and general patient assessment was not completed during this skilled nursing facility outreach.

## 2019-07-11 ENCOUNTER — PATIENT OUTREACH (OUTPATIENT)
Dept: CASE MANAGEMENT | Age: 84
End: 2019-07-11

## 2019-07-11 NOTE — PROGRESS NOTES
Community Care Team Documentation for Patient in [facility]  Subsequent Follow up     Patient remains at [facility] ([facility]). See previous [facility] Team notes. PCP : Vickie Pro MD    Spoke with SNF team.  Confirmed patient transitioned to LTC at [address] [place] [address],[address] on 7/9/19. CCT will sign of at this time. Medications were not reconciled and general patient assessment was not completed during this skilled nursing facility outreach.      Sandra Walker, MSN, RN, ACNS-BC, [facility]  Nurse Navigator, [employer] [phone]

## 2020-08-09 ENCOUNTER — APPOINTMENT (OUTPATIENT)
Dept: GENERAL RADIOLOGY | Age: 85
End: 2020-08-09
Attending: EMERGENCY MEDICINE
Payer: MEDICARE

## 2020-08-09 ENCOUNTER — HOSPITAL ENCOUNTER (EMERGENCY)
Age: 85
Discharge: HOME | End: 2020-08-09
Attending: EMERGENCY MEDICINE
Payer: MEDICARE

## 2020-08-09 ENCOUNTER — APPOINTMENT (OUTPATIENT)
Dept: CT IMAGING | Age: 85
End: 2020-08-09
Attending: EMERGENCY MEDICINE
Payer: MEDICARE

## 2020-08-09 VITALS
HEART RATE: 68 BPM | BODY MASS INDEX: 24.8 KG/M2 | HEIGHT: 59 IN | SYSTOLIC BLOOD PRESSURE: 186 MMHG | DIASTOLIC BLOOD PRESSURE: 66 MMHG | RESPIRATION RATE: 18 BRPM | WEIGHT: 123 LBS | OXYGEN SATURATION: 96 % | TEMPERATURE: 98 F

## 2020-08-09 DIAGNOSIS — S09.90XA INJURY OF HEAD, INITIAL ENCOUNTER: Primary | ICD-10-CM

## 2020-08-09 DIAGNOSIS — R93.89 ABNORMAL CHEST X-RAY: ICD-10-CM

## 2020-08-09 LAB
ALBUMIN SERPL-MCNC: 3.7 G/DL (ref 3.5–5)
ALBUMIN/GLOB SERPL: 0.9 {RATIO} (ref 1.1–2.2)
ALP SERPL-CCNC: 95 U/L (ref 45–117)
ALT SERPL-CCNC: 20 U/L (ref 12–78)
ANION GAP SERPL CALC-SCNC: 5 MMOL/L (ref 5–15)
APPEARANCE UR: CLEAR
AST SERPL-CCNC: 16 U/L (ref 15–37)
ATRIAL RATE: 65 BPM
BACTERIA URNS QL MICRO: NEGATIVE /HPF
BASOPHILS # BLD: 0 K/UL (ref 0–0.1)
BASOPHILS NFR BLD: 0 % (ref 0–1)
BILIRUB SERPL-MCNC: 0.8 MG/DL (ref 0.2–1)
BILIRUB UR QL: NEGATIVE
BUN SERPL-MCNC: 11 MG/DL (ref 6–20)
BUN/CREAT SERPL: 19 (ref 12–20)
CALCIUM SERPL-MCNC: 8.7 MG/DL (ref 8.5–10.1)
CALCULATED P AXIS, ECG09: 90 DEGREES
CALCULATED R AXIS, ECG10: -36 DEGREES
CALCULATED T AXIS, ECG11: 68 DEGREES
CHLORIDE SERPL-SCNC: 108 MMOL/L (ref 97–108)
CK SERPL-CCNC: 29 U/L (ref 26–192)
CO2 SERPL-SCNC: 28 MMOL/L (ref 21–32)
COLOR UR: ABNORMAL
COMMENT, HOLDF: NORMAL
CREAT SERPL-MCNC: 0.57 MG/DL (ref 0.55–1.02)
DIAGNOSIS, 93000: NORMAL
DIFFERENTIAL METHOD BLD: ABNORMAL
EOSINOPHIL # BLD: 0.1 K/UL (ref 0–0.4)
EOSINOPHIL NFR BLD: 1 % (ref 0–7)
EPITH CASTS URNS QL MICRO: ABNORMAL /LPF
ERYTHROCYTE [DISTWIDTH] IN BLOOD BY AUTOMATED COUNT: 15.6 % (ref 11.5–14.5)
GLOBULIN SER CALC-MCNC: 4 G/DL (ref 2–4)
GLUCOSE SERPL-MCNC: 116 MG/DL (ref 65–100)
GLUCOSE UR STRIP.AUTO-MCNC: NEGATIVE MG/DL
HCT VFR BLD AUTO: 50.7 % (ref 35–47)
HGB BLD-MCNC: 16.9 G/DL (ref 11.5–16)
HGB UR QL STRIP: NEGATIVE
HYALINE CASTS URNS QL MICRO: ABNORMAL /LPF (ref 0–5)
IMM GRANULOCYTES # BLD AUTO: 0 K/UL (ref 0–0.04)
IMM GRANULOCYTES NFR BLD AUTO: 0 % (ref 0–0.5)
KETONES UR QL STRIP.AUTO: NEGATIVE MG/DL
LEUKOCYTE ESTERASE UR QL STRIP.AUTO: NEGATIVE
LYMPHOCYTES # BLD: 0.7 K/UL (ref 0.8–3.5)
LYMPHOCYTES NFR BLD: 8 % (ref 12–49)
MCH RBC QN AUTO: 32.4 PG (ref 26–34)
MCHC RBC AUTO-ENTMCNC: 33.3 G/DL (ref 30–36.5)
MCV RBC AUTO: 97.1 FL (ref 80–99)
MONOCYTES # BLD: 0.2 K/UL (ref 0–1)
MONOCYTES NFR BLD: 2 % (ref 5–13)
NEUTS BAND NFR BLD MANUAL: 2 %
NEUTS SEG # BLD: 7.3 K/UL (ref 1.8–8)
NEUTS SEG NFR BLD: 87 % (ref 32–75)
NITRITE UR QL STRIP.AUTO: NEGATIVE
NRBC # BLD: 0 K/UL (ref 0–0.01)
NRBC BLD-RTO: 0 PER 100 WBC
P-R INTERVAL, ECG05: 198 MS
PH UR STRIP: 7 [PH] (ref 5–8)
PLATELET # BLD AUTO: 206 K/UL (ref 150–400)
PMV BLD AUTO: 10.4 FL (ref 8.9–12.9)
POTASSIUM SERPL-SCNC: 3.3 MMOL/L (ref 3.5–5.1)
PROT SERPL-MCNC: 7.7 G/DL (ref 6.4–8.2)
PROT UR STRIP-MCNC: 100 MG/DL
Q-T INTERVAL, ECG07: 432 MS
QRS DURATION, ECG06: 80 MS
QTC CALCULATION (BEZET), ECG08: 449 MS
RBC # BLD AUTO: 5.22 M/UL (ref 3.8–5.2)
RBC #/AREA URNS HPF: ABNORMAL /HPF (ref 0–5)
RBC MORPH BLD: ABNORMAL
SAMPLES BEING HELD,HOLD: NORMAL
SODIUM SERPL-SCNC: 141 MMOL/L (ref 136–145)
SP GR UR REFRACTOMETRY: 1.01 (ref 1–1.03)
TROPONIN I SERPL-MCNC: <0.05 NG/ML
UA: UC IF INDICATED,UAUC: ABNORMAL
UROBILINOGEN UR QL STRIP.AUTO: 0.2 EU/DL (ref 0.2–1)
VENTRICULAR RATE, ECG03: 65 BPM
WBC # BLD AUTO: 8.3 K/UL (ref 3.6–11)
WBC URNS QL MICRO: ABNORMAL /HPF (ref 0–4)

## 2020-08-09 PROCEDURE — 36415 COLL VENOUS BLD VENIPUNCTURE: CPT

## 2020-08-09 PROCEDURE — 72125 CT NECK SPINE W/O DYE: CPT

## 2020-08-09 PROCEDURE — 80053 COMPREHEN METABOLIC PANEL: CPT

## 2020-08-09 PROCEDURE — 84484 ASSAY OF TROPONIN QUANT: CPT

## 2020-08-09 PROCEDURE — 81001 URINALYSIS AUTO W/SCOPE: CPT

## 2020-08-09 PROCEDURE — 71045 X-RAY EXAM CHEST 1 VIEW: CPT

## 2020-08-09 PROCEDURE — 82550 ASSAY OF CK (CPK): CPT

## 2020-08-09 PROCEDURE — 93005 ELECTROCARDIOGRAM TRACING: CPT

## 2020-08-09 PROCEDURE — 99285 EMERGENCY DEPT VISIT HI MDM: CPT

## 2020-08-09 PROCEDURE — 85025 COMPLETE CBC W/AUTO DIFF WBC: CPT

## 2020-08-09 PROCEDURE — 70450 CT HEAD/BRAIN W/O DYE: CPT

## 2020-08-09 NOTE — ED PROVIDER NOTES
EMERGENCY DEPARTMENT HISTORY AND PHYSICAL EXAM      Date: 8/9/2020  Patient Name: Jacqualine Severin Enroughty    History of Presenting Illness     Chief Complaint   Patient presents with    Fall     Pt was ambulating to the bathroom when she fell and hit her head. HPI: Vinicius Triplett, 80 y.o. female presents to the ED with cc of ground-level fall. Apparently was wearing wool socks and slipped on the floor this morning. Hit the back of her head on the ground. No loss of consciousness suspected. Sent into ED for evaluation. Per EMS, patient confused throughout their transport but worsening slightly. Patient awake and alert in ED, oriented to person and place but not time, moves all extremities. Small amount of ptosis to the right upper eyelid noted but it resolves when patient raises her eyebrows. Unknown if baseline. Patient has DNR paperwork on file. There are no other complaints, changes, or physical findings at this time. PCP: Loreta Angeles MD    No current facility-administered medications on file prior to encounter. Current Outpatient Medications on File Prior to Encounter   Medication Sig Dispense Refill    hydrocortisone (CORTAID) 1 % topical cream Apply  to affected area two (2) times a day. use thin layer, apply to affected areas 30 g 0    L. acidoph & paracasei- S therm- Bifido (ALFA-Q/RISAQUAD) 8 billion cell cap cap Take 1 Cap by mouth daily. 20 Cap 0    diazePAM (VALIUM) 2 mg tablet Take 1 Tab by mouth every twelve (12) hours as needed for Anxiety. Max Daily Amount: 4 mg. 5 Tab 0    aspirin 81 mg chewable tablet Take 81 mg by mouth daily.  cyanocobalamin (VITAMIN B12) 1,000 mcg/mL injection 1,000 mcg by IntraMUSCular route every thirty (30) days.  hydroCHLOROthiazide (HYDRODIURIL) 25 mg tablet Take 25 mg by mouth daily.  metoprolol tartrate (LOPRESSOR) 50 mg tablet Take 50 mg by mouth every morning.       metoprolol tartrate (LOPRESSOR) 50 mg tablet Take 25 mg by mouth every evening.  acetaminophen (TYLENOL) 325 mg tablet Take 325 mg by mouth every six (6) hours as needed for Pain.  nitroglycerin (NITROSTAT) 0.4 mg SL tablet 0.4 mg by SubLINGual route every five (5) minutes as needed for Chest Pain. Up to 3 doses.  multivitamin (ONE A DAY) tablet Take 1 Tab by mouth daily.  amLODIPine (NORVASC) 5 mg tablet Take 5 mg by mouth daily.  simvastatin (ZOCOR) 10 mg tablet Take 10 mg by mouth daily.  nitroglycerin (NITRODUR) 0.2 mg/hr 1 Patch by TransDERmal route daily.  trazodone (DESYREL) 50 mg tablet Take 50 mg by mouth nightly.  Fish Oil-Omega-3 Fatty Acids (FISH OIL) 360-1,200 mg Cap Take 1 Cap by mouth two (2) times a day. Past History     Past Medical History:  Past Medical History:   Diagnosis Date    Angina     has had mi    Arrhythmia     Cancer ([facility] [address]     basal cell removed form back    Chronic pain     fibromyalgia    Hypertension     Insomnia     Thyroid disease        Past Surgical History:  Past Surgical History:   Procedure Laterality Date    HX CHOLECYSTECTOMY      HX CORONARY STENT PLACEMENT      HX OTHER SURGICAL      basal cell removed from back    IR KYPHOPLASTY THORACIC  6/12/2019       Family History:  No family history on file. Social History:  Social History     Tobacco Use    Smoking status: Never Smoker    Smokeless tobacco: Never Used   Substance Use Topics    Alcohol use: No    Drug use: No       Allergies:   Allergies   Allergen Reactions    Ceftriaxone Hives    Benadryl [Diphenhydramine Hcl] Hives and Rash     Patient's son says she's not allergic    Celebrex [Celecoxib] Rash    Ciprofloxacin Rash    Ivp [Fd And C Blue No.1] Rash and Swelling    Macrobid [Nitrofurantoin Monohyd/M-Cryst] Swelling    Niaspan [Niacin] Palpitations    Pcn [Penicillins] Rash and Swelling    Seafood [Shellfish Containing Products] Rash and Swelling         Review of Systems   Review of Systems   Unable to perform ROS: Mental status change       Physical Exam   Physical Exam  Vitals signs and nursing note reviewed. Constitutional:       Appearance: Normal appearance. HENT:      Head: Normocephalic. Comments: Hematoma to posterior R scalp     Mouth/Throat:      Mouth: Mucous membranes are moist.   Eyes:      Extraocular Movements: Extraocular movements intact. Neck:      Musculoskeletal: Neck supple. No muscular tenderness. Cardiovascular:      Rate and Rhythm: Normal rate and regular rhythm. Pulses: Normal pulses. Pulmonary:      Effort: Pulmonary effort is normal.      Breath sounds: Normal breath sounds. Comments: Nontender  Abdominal:      Palpations: Abdomen is soft. Tenderness: There is no abdominal tenderness. Comments: Nontender   Musculoskeletal:         General: No deformity. Comments: Nontender   Skin:     General: Skin is warm and dry. Neurological:      General: No focal deficit present. Mental Status: She is alert. Comments: Oriented x 2, MAEE, ? R ptosis w/o other focal CN deficits   Psychiatric:         Mood and Affect: Mood normal.         Behavior: Behavior normal.         Diagnostic Study Results     Labs -     Recent Results (from the past 24 hour(s))   EKG, 12 LEAD, INITIAL    Collection Time: 08/09/20  8:57 AM   Result Value Ref Range    Ventricular Rate 65 BPM    Atrial Rate 65 BPM    P-R Interval 198 ms    QRS Duration 80 ms    Q-T Interval 432 ms    QTC Calculation (Bezet) 449 ms    Calculated P Axis 90 degrees    Calculated R Axis -36 degrees    Calculated T Axis 68 degrees    Diagnosis       Normal sinus rhythm  Left axis deviation  Pulmonary disease pattern  Left ventricular hypertrophy with repolarization abnormality  When compared with ECG of 13-MAY-2018 11:44,  Nonspecific T wave abnormality now evident in Lateral leads  Confirmed by Alexey Dunlap ([id number]) on 8/9/2020 9:58:34 AM     CBC WITH AUTOMATED DIFF Collection Time: 08/09/20  9:07 AM   Result Value Ref Range    WBC 8.3 3.6 - 11.0 K/uL    RBC 5.22 (H) 3.80 - 5.20 M/uL    HGB 16.9 (H) 11.5 - 16.0 g/dL    HCT 50.7 (H) 35.0 - 47.0 %    MCV 97.1 80.0 - 99.0 FL    MCH 32.4 26.0 - 34.0 PG    MCHC 33.3 30.0 - 36.5 g/dL    RDW 15.6 (H) 11.5 - 14.5 %    PLATELET 302 913 - 502 K/uL    MPV 10.4 8.9 - 12.9 FL    NRBC 0.0 0  WBC    ABSOLUTE NRBC 0.00 0.00 - 0.01 K/uL    NEUTROPHILS 87 (H) 32 - 75 %    BAND NEUTROPHILS 2 %    LYMPHOCYTES 8 (L) 12 - 49 %    MONOCYTES 2 (L) 5 - 13 %    EOSINOPHILS 1 0 - 7 %    BASOPHILS 0 0 - 1 %    IMMATURE GRANULOCYTES 0 0.0 - 0.5 %    ABS. NEUTROPHILS 7.3 1.8 - 8.0 K/UL    ABS. LYMPHOCYTES 0.7 (L) 0.8 - 3.5 K/UL    ABS. MONOCYTES 0.2 0.0 - 1.0 K/UL    ABS. EOSINOPHILS 0.1 0.0 - 0.4 K/UL    ABS. BASOPHILS 0.0 0.0 - 0.1 K/UL    ABS. IMM. GRANS. 0.0 0.00 - 0.04 K/UL    DF MANUAL      RBC COMMENTS NORMOCYTIC, NORMOCHROMIC     SAMPLES BEING HELD    Collection Time: 08/09/20  9:47 AM   Result Value Ref Range    SAMPLES BEING HELD LAV     COMMENT        Add-on orders for these samples will be processed based on acceptable specimen integrity and analyte stability, which may vary by analyte. METABOLIC PANEL, COMPREHENSIVE    Collection Time: 08/09/20  9:47 AM   Result Value Ref Range    Sodium 141 136 - 145 mmol/L    Potassium 3.3 (L) 3.5 - 5.1 mmol/L    Chloride 108 97 - 108 mmol/L    CO2 28 21 - 32 mmol/L    Anion gap 5 5 - 15 mmol/L    Glucose 116 (H) 65 - 100 mg/dL    BUN 11 6 - 20 MG/DL    Creatinine 0.57 0.55 - 1.02 MG/DL    BUN/Creatinine ratio 19 12 - 20      GFR est AA >60 >60 ml/min/1.73m2    GFR est non-AA >60 >60 ml/min/1.73m2    Calcium 8.7 8.5 - 10.1 MG/DL    Bilirubin, total 0.8 0.2 - 1.0 MG/DL    ALT (SGPT) 20 12 - 78 U/L    AST (SGOT) 16 15 - 37 U/L    Alk.  phosphatase 95 45 - 117 U/L    Protein, total 7.7 6.4 - 8.2 g/dL    Albumin 3.7 3.5 - 5.0 g/dL    Globulin 4.0 2.0 - 4.0 g/dL    A-G Ratio 0.9 (L) 1.1 - 2.2     CK W/ REFLX CKMB    Collection Time: 08/09/20  9:47 AM   Result Value Ref Range    CK 29 26 - 192 U/L   TROPONIN I    Collection Time: 08/09/20  9:47 AM   Result Value Ref Range    Troponin-I, Qt. <0.05 <0.05 ng/mL   URINALYSIS W/ REFLEX CULTURE    Collection Time: 08/09/20  9:48 AM    Specimen: Urine   Result Value Ref Range    Color YELLOW/STRAW      Appearance CLEAR CLEAR      Specific gravity 1.012 1.003 - 1.030      pH (UA) 7.0 5.0 - 8.0      Protein 100 (A) NEG mg/dL    Glucose Negative NEG mg/dL    Ketone Negative NEG mg/dL    Bilirubin Negative NEG      Blood Negative NEG      Urobilinogen 0.2 0.2 - 1.0 EU/dL    Nitrites Negative NEG      Leukocyte Esterase Negative NEG      WBC 0-4 0 - 4 /hpf    RBC 0-5 0 - 5 /hpf    Epithelial cells FEW FEW /lpf    Bacteria Negative NEG /hpf    UA:UC IF INDICATED CULTURE NOT INDICATED BY UA RESULT CNI      Hyaline cast 0-2 0 - 5 /lpf       Radiologic Studies -   XR CHEST PORT   Final Result   IMPRESSION: Interval left suprahilar prominence concerning for mass lesion,   measuring 2.4 cm in size. CT SPINE CERV WO CONT   Final Result   IMPRESSION:      1. Osteopenia. Degenerative findings. No acute fracture or dislocation   demonstrated. 2. Several bilateral apical pulmonary nodules measuring up to 13 mm. Clinical   correlation recommended. CT HEAD WO CONT   Final Result   IMPRESSION:    Atrophy and chronic small vessel ischemic disease the white matter again   demonstrated. Extracalvarial soft tissue hematoma at right parietal vertex   demonstrated in the interval. No acute intracranial hemorrhage or mass effect   demonstrated. CT Results  (Last 48 hours)               08/09/20 0930  CT SPINE CERV WO CONT Final result    Impression:  IMPRESSION:       1. Osteopenia. Degenerative findings. No acute fracture or dislocation   demonstrated. 2. Several bilateral apical pulmonary nodules measuring up to 13 mm. Clinical   correlation recommended. Narrative:  EXAM:  CT CERVICAL SPINE WITHOUT CONTRAST       INDICATION: trauma. COMPARISON: None. CONTRAST:  None. TECHNIQUE: Multislice helical CT of the cervical spine was performed without   intravenous contrast administration. Sagittal and coronal reformats were   generated. CT dose reduction was achieved through use of a standardized   protocol tailored for this examination and automatic exposure control for dose   modulation. FINDINGS:       The bones are severely osteopenic. Vertebral body heights are normal. Alignment   is anatomic. The posterior processes appear intact. Moderate disc space narrowing is present at C3-4, C4-5, C5-6 and C6-7. There is   also mild facet osteoarthrosis on the right at C2-3, C3-4, C4-5 and C7-T1. On   the left, no substantial facet arthrosis is shown. No osseous canal stenosis is   demonstrated. Moderate right and mild left foraminal narrowing is present at   C3-4. Severe right foraminal narrowing is present at C4-5. Moderate to severe   right and moderate left foraminal narrowing is present at C5-6. Mild right   foraminal narrowing is present at C6-7. No prevertebral soft tissue mass,   swelling or collection is shown. Several nodular densities are shown in the   apical regions of the lungs bilaterally, with ill-defined spiculated margins,   measuring up to 13 mm in size. 08/09/20 0928  CT HEAD WO CONT Final result    Impression:  IMPRESSION:    Atrophy and chronic small vessel ischemic disease the white matter again   demonstrated. Extracalvarial soft tissue hematoma at right parietal vertex   demonstrated in the interval. No acute intracranial hemorrhage or mass effect   demonstrated. Narrative:  EXAM: CT HEAD WO CONT       INDICATION: fall       COMPARISON: CT 6/8/2019. CONTRAST: None. TECHNIQUE: Unenhanced CT of the head was performed using 5 mm images. Brain and   bone windows were generated.  Coronal and sagittal reformats. CT dose reduction   was achieved through use of a standardized protocol tailored for this   examination and automatic exposure control for dose modulation. FINDINGS:   There is a moderately prominent extracalvarial (subgaleal) hematoma at the   superior right parietal region. There is mild prominence of the ventricles and   cortical sulci again noted, consistent with atrophy The ventricles and sulci are   normal in size, shape and configuration. . Moderately prominent bilateral   periventricular and centrum semiovale diminished attenuation of the cerebrum is   again shown, consistent with chronic small vessel ischemic disease the white   matter. There is no intracranial hemorrhage, extra-axial collection, or mass   effect. The basilar cisterns are open. No CT evidence of acute infarct. The bone windows demonstrate no abnormalities. The visualized portions of the   paranasal sinuses and mastoid air cells are clear. CXR Results  (Last 48 hours)               08/09/20 1011  XR CHEST PORT Final result    Impression:  IMPRESSION: Interval left suprahilar prominence concerning for mass lesion,   measuring 2.4 cm in size. Narrative:  EXAM: XR CHEST PORT       INDICATION: fall       COMPARISON: 6/12/2019       FINDINGS: A portable AP radiograph of the chest was obtained at 0954 hours. There is no pneumothorax or pleural effusion. No consolidation or pulmonary   edema is shown. Ill-defined left suprahilar masslike prominence is noted   measuring 2.4 cm. Cardiac size is mildly enlarged. Atherosclerotic mild thoracic   aortic tortuosity is noted. Vertebroplasty cement is demonstrated on one of the   segments of the lower thoracic spine. The bones are severely osteopenic. .                    Medical Decision Making   I am the first provider for this patient.     I reviewed the vital signs, available nursing notes, past medical history, past surgical history, family history and social history. Vital Signs-Reviewed the patient's vital signs. Patient Vitals for the past 24 hrs:   Temp Pulse Resp BP SpO2   08/09/20 1233  68 18 186/66 96 %   08/09/20 1145  66 20 (!) 201/65 94 %   08/09/20 1015  72  199/67 93 %   08/09/20 1000    (!) 176/102 93 %   08/09/20 0945    (!) 206/75 93 %   08/09/20 0850 98 °F (36.7 °C) 66 18           Provider Notes (Medical Decision Making):   80year-old presenting to ED with chief complaint of head injury. She has a small hematoma on her posterior scalp, no laceration. She seems to be at her mental status baseline. CT imaging demonstrates no fractures. Basic labs done, all reassuring. She was watched for a while in the emergency department had no change in her mental status or any decompensation at all. She ambulates with no difficulty and tolerates p.o. I think she is safe for discharge at this time with plan to follow-up with her primary care physician for further evaluation. Return precautions given. EKG sinus rate 65, nonspecific ST T changes, normal QT  ED Course:     Initial assessment performed. The patients presenting problems have been discussed, and they are in agreement with the care plan formulated and outlined with them. I have encouraged them to ask questions as they arise throughout their visit. Disposition:  dc    PLAN:  1. Current Discharge Medication List        2.    Follow-up Information    None       Return to ED if worse     Diagnosis     Clinical Impression: head injury

## 2020-08-09 NOTE — DISCHARGE INSTRUCTIONS
You must follow up with your physician for further evaluation of suprahilar lesion seen on chest xray. Follow up with your physician for further evaluation in 1-2 days.     Return to ED with worsening of condition or new concerning symptoms

## 2020-08-09 NOTE — ED NOTES
9659 Labs hemolyzed reordered. LYNDA Jacobsen 20 pt on bedpan. 1147 Pt attempted to climb out of bed. RN attempted to redirect pt back into bed. Pt refused Monitoring. Ride arranged home transport. 1200 pt placed in hair bed for additional monitoring. 1230 redirected pt to stretcher. Pt continues to attempt to climb out of bed. Pt remains on a 1:1  1330 placed pt back in bed. Contacted [facility].

## 2022-03-18 PROBLEM — J18.9 CAP (COMMUNITY ACQUIRED PNEUMONIA): Status: ACTIVE | Noted: 2019-06-08

## 2023-02-09 NOTE — PROGRESS NOTES
Community Care Team Documentation for Patient in [facility]  Subsequent Follow up     Patient remains at [facility] ([facility]). See previous [facility] Team notes. PCP : Tabitha Carson MD    Spoke with SNF team.  PT/OT update: Currently ambulating 200 ft with walker and SBA. SBA with transfers. Supervision with upper body ADLs. Min assist with lower body ADLs. Disposition: Anticipate LOS 1-2 more weeks. Plan for transition to LTC at [facility]. Medicaid application pending. Medications were not reconciled and general patient assessment was not completed during this skilled nursing facility outreach.
never

## 2023-05-11 RX ORDER — DIAZEPAM 2 MG/1
TABLET ORAL
COMMUNITY
Start: 2019-06-13

## 2023-05-11 RX ORDER — NITROGLYCERIN 40 MG/1
1 PATCH TRANSDERMAL DAILY
COMMUNITY

## 2023-05-11 RX ORDER — NITROGLYCERIN 0.4 MG/1
TABLET SUBLINGUAL
COMMUNITY

## 2023-05-11 RX ORDER — HYDROCHLOROTHIAZIDE 25 MG/1
25 TABLET ORAL DAILY
COMMUNITY

## 2023-05-11 RX ORDER — AMLODIPINE BESYLATE 5 MG/1
5 TABLET ORAL DAILY
COMMUNITY

## 2023-05-11 RX ORDER — METOPROLOL TARTRATE 50 MG/1
TABLET, FILM COATED ORAL EVERY EVENING
COMMUNITY

## 2023-05-11 RX ORDER — CYANOCOBALAMIN 1000 UG/ML
INJECTION, SOLUTION INTRAMUSCULAR; SUBCUTANEOUS
COMMUNITY

## 2023-05-11 RX ORDER — ASPIRIN 81 MG/1
81 TABLET, CHEWABLE ORAL DAILY
COMMUNITY

## 2023-05-11 RX ORDER — ACETAMINOPHEN 325 MG/1
TABLET ORAL EVERY 6 HOURS PRN
COMMUNITY

## 2023-05-11 RX ORDER — TRAZODONE HYDROCHLORIDE 50 MG/1
50 TABLET ORAL NIGHTLY
COMMUNITY

## 2023-05-11 RX ORDER — DIAPER,BRIEF,INFANT-TODD,DISP
EACH MISCELLANEOUS 2 TIMES DAILY
COMMUNITY
Start: 2019-06-13

## 2023-05-11 RX ORDER — SIMVASTATIN 10 MG
10 TABLET ORAL DAILY
COMMUNITY

## 2024-02-08 NOTE — PROGRESS NOTES
Problem: Mobility Impaired (Adult and Pediatric)  Goal: *Acute Goals and Plan of Care (Insert Text)  Description  Physical Therapy Goals  Initiated 6/10/2019  1. Patient will move from supine to sit and sit to supine  in bed with moderate assistance  within 7 day(s). 2.  Patient will transfer from bed to chair and chair to bed with minimal assistance/contact guard assist using the least restrictive device within 7 day(s). 3.  Patient will perform sit to stand with minimal assistance/contact guard assist within 7 day(s). 4.  Patient will ambulate with minimal assistance/contact guard assist for 50 feet with the least restrictive device within 7 day(s). Outcome: Progressing Towards Goal  PHYSICAL THERAPY EVALUATION  Patient: Manuel Urban (59 y.o. female)  Date: 6/10/2019  Primary Diagnosis: CAP (community acquired pneumonia) [J18.9]        Precautions:   Fall, Back    ASSESSMENT :  Based on the objective data described below, the patient presents with decreased functional mobility, impaired balance and gait, decreased tolerance to activity and increased back pain s/p admission on 6/8/19 after sustaining a fall resulting in T10-T11 vertebral fxs--pt refusing kyphoplasty. Pt reported prior to admission, she was ambulatory with a rollator, lives with her aunt in a 1 story home, and was cooking and cleaning. Pt educated on back precautions and log roll technique. Pt completed rolling and supine to sit with max A and additional time and effort. Pt performed sit<>stand with RW with min A. Pt ambulated a short distance for bed to chair with RW with min A demonstrating step to gait pattern, narrow GENEVIEVE, increased pain with each step. Pt will continue to benefit from PT to progress mobility as tolerated. Pt is functioning well below baseline mobility status and will benefit from SNF placement upon discharge pending progress.     Patient will benefit from skilled intervention to address the above Uses Estrogen cream   impairments. Patient?s rehabilitation potential is considered to be Fair  Factors which may influence rehabilitation potential include:   ? None noted  ? Mental ability/status  ? Medical condition  ? Home/family situation and support systems  ? Safety awareness  ? Pain tolerance/management  ? Other:      PLAN :  Recommendations and Planned Interventions:  ?           Bed Mobility Training             ? Neuromuscular Re-Education  ? Transfer Training                   ? Orthotic/Prosthetic Training  ? Gait Training                         ? Modalities  ? Therapeutic Exercises           ? Edema Management/Control  ? Therapeutic Activities            ? Patient and Family Training/Education  ? Other (comment):    Frequency/Duration: Patient will be followed by physical therapy  5 times a week to address goals. Discharge Recommendations: Anurag Luna  Further Equipment Recommendations for Discharge: tbd      SUBJECTIVE:   Patient stated ? I'll try getting up.?    OBJECTIVE DATA SUMMARY:   HISTORY:    Past Medical History:   Diagnosis Date    Angina     has had mi    Arrhythmia     Cancer ([facility] [address]     basal cell removed form back    Chronic pain     fibromyalgia    Hypertension     Insomnia     Thyroid disease      Past Surgical History:   Procedure Laterality Date    HX CHOLECYSTECTOMY      HX CORONARY STENT PLACEMENT      HX OTHER SURGICAL      basal cell removed from back     Prior Level of Function/Home Situation: ambulatory with a rollator, lives with her aunt, who pt reported was older than her.  Pt reported being able to cook and clean for herself  Personal factors and/or comorbidities impacting plan of care:     Home Situation  Home Environment: Private residence  # Steps to Enter: 3  Rails to Enter: Yes  Wheelchair Ramp: Yes  One/Two Story Residence: One story  Living Alone: No  Support Systems: Family member(s)(Aunt)  Current DME Used/Available at Home: Ruy Dhillono, rollator, Shower chair, Grab bars  Tub or Shower Type: Tub/Shower combination    EXAMINATION/PRESENTATION/DECISION MAKING:   Critical Behavior:  Neurologic State: Confused  Orientation Level: Oriented to person        Hearing: Auditory  Auditory Impairment: None  Skin:    Edema:   Range Of Motion:  AROM: Generally decreased, functional                       Strength:    Strength: Generally decreased, functional                    Tone & Sensation:                                  Coordination:     Vision:      Functional Mobility:  Bed Mobility:  Rolling: Maximum assistance  Supine to Sit: Maximum assistance        Transfers:  Sit to Stand: Minimum assistance;Assist x2  Stand to Sit: Minimum assistance;Assist x2        Bed to Chair: Minimum assistance;Assist x2              Balance:   Sitting: Impaired  Sitting - Static: Good (unsupported)  Sitting - Dynamic: Fair (occasional)  Standing: Impaired; With support  Standing - Static: Constant support; Fair  Standing - Dynamic : Fair  Ambulation/Gait Training:  Distance (ft): 2 Feet (ft)  Assistive Device: Gait belt;Walker, rolling  Ambulation - Level of Assistance: Minimal assistance        Gait Abnormalities: Decreased step clearance;Shuffling gait        Base of Support: Narrowed     Speed/Mikala: Pace decreased (<100 feet/min)  Step Length: Right shortened;Left shortened                     Stairs:               Therapeutic Exercises:       Functional Measure:  Tinetti test:    Sitting Balance: 1  Arises: 0  Attempts to Rise: 0  Immediate Standing Balance: 0  Standing Balance: 0  Nudged: 0  Eyes Closed: 0  Turn 360 Degrees - Continuous/Discontinuous: 0  Turn 360 Degrees - Steady/Unsteady: 0  Sitting Down: 1  Balance Score: 2  Indication of Gait: 0  R Step Length/Height: 0  L Step Length/Height: 0  R Foot Clearance: 1  L Foot Clearance: 1  Step Symmetry: 0  Step Continuity: 0  Path: 0  Trunk: 0  Walking Time: 1  Gait Score: 3  Total Score: 5         Tinetti Tool Score Risk of Falls  <19 = High Fall Risk  19-24 = Moderate Fall Risk  25-28 = Low Fall Risk  Bertinetti ME. Performance-Oriented Assessment of Mobility Problems in Elderly Patients. [facility]; [id number]. (Scoring Description: PT Bulletin Feb. 10, 1993)    Older adults: Cj Whitney et al, 2009; n = 1000 [facility] elderly evaluated with ABC, SALENA, ADL, and IADL)  · Mean SALENA score for males aged 69-68 years = 26.21(3.40)  · Mean SALENA score for females age 69-68 years = 25.16(4.30)  · Mean SALENA score for males over 80 years = 23.29(6.02)  · Mean SALENA score for females over 80 years = 17.20(8.32)          Based on the above components, the patient evaluation is determined to be of the following complexity level: LOW     Pain:  Pain Scale 1: Numeric (0 - 10)  Pain Intensity 1: 8  Pain Location 1: Back  Pain Orientation 1: Medial;Lower  Pain Description 1: Aching  Pain Intervention(s) 1: Medication (see MAR)  Activity Tolerance:   Good. VSS on 3L of oxygen  Please refer to the flowsheet for vital signs taken during this treatment. After treatment:   ?         Patient left in no apparent distress sitting up in chair  ? Patient left in no apparent distress in bed  ? Call bell left within reach  ? Nursing notified  ? Caregiver present  ? Bed alarm activated    COMMUNICATION/EDUCATION:   The patient?s plan of care was discussed with: Occupational Therapist and Registered Nurse. ?         Fall prevention education was provided and the patient/caregiver indicated understanding. ? Patient/family have participated as able in goal setting and plan of care. ?         Patient/family agree to work toward stated goals and plan of care. ?         Patient understands intent and goals of therapy, but is neutral about his/her participation. ?          Patient is unable to participate in goal setting and plan of care.     Thank you for this referral.  Leonarda Ross, PT, DPT   Time Calculation: 17 mins